# Patient Record
Sex: MALE | Race: WHITE | NOT HISPANIC OR LATINO
[De-identification: names, ages, dates, MRNs, and addresses within clinical notes are randomized per-mention and may not be internally consistent; named-entity substitution may affect disease eponyms.]

---

## 2017-05-08 ENCOUNTER — APPOINTMENT (OUTPATIENT)
Dept: VASCULAR SURGERY | Facility: CLINIC | Age: 77
End: 2017-05-08

## 2019-05-17 ENCOUNTER — EMERGENCY (EMERGENCY)
Facility: HOSPITAL | Age: 79
LOS: 1 days | Discharge: HOME | End: 2019-05-17
Attending: EMERGENCY MEDICINE | Admitting: EMERGENCY MEDICINE
Payer: MEDICARE

## 2019-05-17 VITALS
RESPIRATION RATE: 17 BRPM | OXYGEN SATURATION: 98 % | HEART RATE: 84 BPM | TEMPERATURE: 98 F | DIASTOLIC BLOOD PRESSURE: 76 MMHG | SYSTOLIC BLOOD PRESSURE: 157 MMHG

## 2019-05-17 DIAGNOSIS — M25.561 PAIN IN RIGHT KNEE: ICD-10-CM

## 2019-05-17 DIAGNOSIS — M62.81 MUSCLE WEAKNESS (GENERALIZED): ICD-10-CM

## 2019-05-17 DIAGNOSIS — M25.562 PAIN IN LEFT KNEE: ICD-10-CM

## 2019-05-17 PROCEDURE — 99284 EMERGENCY DEPT VISIT MOD MDM: CPT | Mod: 25

## 2019-05-17 RX ORDER — IBUPROFEN 200 MG
800 TABLET ORAL ONCE
Refills: 0 | Status: COMPLETED | OUTPATIENT
Start: 2019-05-17 | End: 2019-05-17

## 2019-05-17 RX ORDER — ACETAMINOPHEN 500 MG
975 TABLET ORAL ONCE
Refills: 0 | Status: COMPLETED | OUTPATIENT
Start: 2019-05-17 | End: 2019-05-18

## 2019-05-17 NOTE — ED ADULT NURSE NOTE - OBJECTIVE STATEMENT
79 y/o male presents to ED c/o bilat knee pain x1 day s/p mechanical fall. AOx3, states was using walker and could not tolerate bearing weight due to pain in bilat knees, states slowly fell onto bottom. Denies head trauma, LoC, dizziness, headache. Hx of arthritis, presents to ED because pain in bilat knees has been worsening.

## 2019-05-17 NOTE — ED ADULT TRIAGE NOTE - CHIEF COMPLAINT QUOTE
BIBA complaining of b/l knee and leg pain s/p sliding off a couch at home. Denies head injury. Denies hip/pelvic pain. States he normally walks with a cane.

## 2019-05-17 NOTE — ED PROVIDER NOTE - CARE PROVIDER_API CALL
Isaias Buenrostro)  Orthopaedic Surgery  [address] [place] [address]  Phone: [phone]  Fax: [phone]  Follow Up Time:

## 2019-05-17 NOTE — ED PROVIDER NOTE - OBJECTIVE STATEMENT
77 y/o M w/hx IDDM, OA, walks with cane at baseline 2/2 LE generalized weakness/gait instability, p/w b/l knee pain for 1 day, causing, 77 y/o M w/hx IDDM, OA, walks with cane at baseline 2/2 LE generalized weakness/gait instability, p/w b/l knee pain for 1 day. No direct trauma. Feels like similar pains he has had in his knees though more severe. No swelling/redness/fever/chills. Has been ambulatory but with difficulty 2/2 pain. Taken no pain meds at home, though typically takes tylenol.

## 2019-05-17 NOTE — ED PROVIDER NOTE - NS ED MD DISPO DISCHARGE CCDA
Please advise patient called back to set up her appointment I tried to help her but could not find a right time for her, please advise, thanks.   Patient/Caregiver provided printed discharge information.

## 2019-05-17 NOTE — ED PROVIDER NOTE - PHYSICAL EXAMINATION
VITAL SIGNS: I have reviewed nursing notes and confirm.  CONSTITUTIONAL: Well-developed; well-nourished; in no acute distress.  SKIN: Agree with RN documentation regarding decubitus evaluation. Remainder of skin exam is warm and dry, no acute rash.  HEAD: Normocephalic; atraumatic.  EYES: PERRL, EOM intact; conjunctiva and sclera clear.  ENT: No nasal discharge; airway clear.  NECK: Supple; non tender.  CARD: S1, S2 normal; no murmurs, gallops, or rubs. Regular rate and rhythm.  RESP: No wheezes, rales or rhonchi.  ABD: Normal bowel sounds; soft; non-distended; non-tender  EXT: Normal ROM. No clubbing, cyanosis or edema. No knee effusions or TTP b/l, no popliteal ttp, distal pulses intact  LYMPH: No acute cervical adenopathy.  NEURO: Alert, oriented. Grossly unremarkable.  PSYCH: Cooperative, appropriate.

## 2019-05-17 NOTE — ED ADULT NURSE NOTE - CHPI ED NUR SYMPTOMS NEG
no deformity/no numbness/no fever/no weakness/no abrasion/no tingling/no back pain/no bruising/no stiffness

## 2019-05-17 NOTE — ED PROVIDER NOTE - NSFOLLOWUPINSTRUCTIONS_ED_ALL_ED_FT
Log Out.    [employer]® CareNotes®     :  [facility]             OSTEOARTHRITIS - AfterCare(R) Instructions(ER/ED)     Osteoarthritis    WHAT YOU NEED TO KNOW:    Osteoarthritis (OA) occurs when cartilage (tissue that cushions a joint) wears away slowly and causes the bones to rub together. OA is a long-term condition that often affects the hands, neck, lower back, knees, and hips. OA is also called arthrosis or degenerative joint disease.    DISCHARGE INSTRUCTIONS:    Call your doctor or specialist if:     You have severe pain.      You cannot move your joint.      You have a fever.      Your joint is red and tender.      You have questions or concerns about your condition or care.    Medicines: You may need any of the following:     Acetaminophen decreases pain and fever. It is available without a doctor's order. Ask how much to take and how often to take it. Follow directions. Read the labels of all other medicines you are using to see if they also contain acetaminophen, or ask your doctor or pharmacist. Acetaminophen can cause liver damage if not taken correctly. Do not use more than 4 grams (4,000 milligrams) total of acetaminophen in one day.       NSAIDs, such as ibuprofen, help decrease swelling, pain, and fever. This medicine is available with or without a doctor's order. NSAIDs can cause stomach bleeding or kidney problems in certain people. If you take blood thinner medicine, always ask your healthcare provider if NSAIDs are safe for you. Always read the medicine label and follow directions.      Capsaicin cream may help decrease pain in your joint.       Prescription pain medicine may be given. Ask your healthcare provider how to take this medicine safely. Some prescription pain medicines contain acetaminophen. Do not take other medicines that contain acetaminophen without talking to your healthcare provider. Too much acetaminophen may cause liver damage. Prescription pain medicine may cause constipation. Ask your healthcare provider how to prevent or treat constipation.       Take your medicine as directed. Contact your healthcare provider if you think your medicine is not helping or if you have side effects. Tell him or her if you are allergic to any medicine. Keep a list of the medicines, vitamins, and herbs you take. Include the amounts, and when and why you take them. Bring the list or the pill bottles to follow-up visits. Carry your medicine list with you in case of an emergency.    Follow up with your healthcare provider as directed: Write down your questions so you remember to ask them during your visits.    Go to physical therapy as directed: A physical therapist teaches you exercises to help improve movement and strength, and to decrease pain in your joints. The exercises also help lower your risk for loss of function. A physical therapist may move an area with his or her hands. For example, he or she may move your leg in certain ways to treat osteoarthritis in your hip.    Manage your symptoms:     Stay active. Physical activity may reduce your pain and improve your ability to do daily activities. Avoid activities that cause pain. Ask your healthcare provider what type of exercise would be best for you.      Maintain a healthy weight. This helps decrease the strain on the joints in your back, hips, knees, ankles, and feet. Ask your healthcare provider what a healthy weight is for you. He or she can help you create a weight loss plan if you are overweight.      Use heat or ice on your joints as directed. Heat and ice help decrease pain, swelling, and muscle spasms. For heat, use a heating pad on a low setting for 20 minutes, or take a warm bath. For ice, use an ice pack, or put crushed ice in a plastic bag. Cover it with a towel before you place it on your joint. Use ice for 15 minutes every hour.      Massage the muscles around the joint. Massage helps relieve pain and stiffness. Your healthcare provider or a physical therapist can show you how to do this. If you have hip OA, another person may need to help you massage the area.      Use a cane, crutches, or a walker if directed. These help protect and relieve pressure on your ankle, knee, and hip joints. You may also be prescribed shoe inserts to decrease pressure in your joints.      Wear flat or low-heeled shoes. This will help decrease pain and reduce pressure on your ankle, knee, and hip joints.         © Copyright [employer] 2019 All illustrations and images included in CareNotes are the copyrighted property of [employer] or [employer].      back to top                      © Copyright [employer] 2019

## 2019-05-17 NOTE — ED ADULT NURSE NOTE - NSIMPLEMENTINTERV_GEN_ALL_ED
Implemented All Fall Risk Interventions:  [place] to call system. Call bell, personal items and telephone within reach. Instruct patient to call for assistance. Room bathroom lighting operational. Non-slip footwear when patient is off stretcher. Physically safe environment: no spills, clutter or unnecessary equipment. Stretcher in lowest position, wheels locked, appropriate side rails in place. Provide visual cue, wrist band, yellow gown, etc. Monitor gait and stability. Monitor for mental status changes and reorient to person, place, and time. Review medications for side effects contributing to fall risk. Reinforce activity limits and safety measures with patient and family.

## 2019-05-17 NOTE — ED PROVIDER NOTE - CLINICAL SUMMARY MEDICAL DECISION MAKING FREE TEXT BOX
No fx on xray, + OA, pain improved w/nsaids, ambulatory w/cane as per baseline, d/c home with ortho f/u and return precautions.

## 2019-05-18 VITALS
OXYGEN SATURATION: 99 % | RESPIRATION RATE: 16 BRPM | TEMPERATURE: 98 F | DIASTOLIC BLOOD PRESSURE: 88 MMHG | SYSTOLIC BLOOD PRESSURE: 158 MMHG | HEART RATE: 78 BPM

## 2019-05-18 PROCEDURE — 73562 X-RAY EXAM OF KNEE 3: CPT | Mod: 26,50

## 2019-05-18 RX ADMIN — Medication 800 MILLIGRAM(S): at 00:47

## 2019-05-18 RX ADMIN — Medication 800 MILLIGRAM(S): at 00:03

## 2019-05-18 RX ADMIN — Medication 975 MILLIGRAM(S): at 00:02

## 2019-05-18 RX ADMIN — Medication 975 MILLIGRAM(S): at 00:47

## 2019-05-18 NOTE — ED ADULT NURSE REASSESSMENT NOTE - NS ED NURSE REASSESS COMMENT FT1
Attempt to ambulate made, even with cane pt unable to ambulate at this time . Pt denies pain in bl knees but states weakness in legs when trying to walk. Pt states he uses a walker at home and the weakness has been happening for "a while" and is not a new.  MD Logan notified.

## 2019-05-22 ENCOUNTER — INPATIENT (INPATIENT)
Facility: HOSPITAL | Age: 79
LOS: 12 days | Discharge: NURSING, REHAB OR LONG-TERM CARE | DRG: 853 | End: 2019-06-04
Attending: INTERNAL MEDICINE | Admitting: INTERNAL MEDICINE
Payer: MEDICARE

## 2019-05-22 VITALS
TEMPERATURE: 100 F | RESPIRATION RATE: 18 BRPM | OXYGEN SATURATION: 93 % | DIASTOLIC BLOOD PRESSURE: 78 MMHG | WEIGHT: 220.46 LBS | SYSTOLIC BLOOD PRESSURE: 165 MMHG | HEART RATE: 95 BPM

## 2019-05-22 DIAGNOSIS — Z29.9 ENCOUNTER FOR PROPHYLACTIC MEASURES, UNSPECIFIED: ICD-10-CM

## 2019-05-22 DIAGNOSIS — Z90.49 ACQUIRED ABSENCE OF OTHER SPECIFIED PARTS OF DIGESTIVE TRACT: Chronic | ICD-10-CM

## 2019-05-22 DIAGNOSIS — Z98.890 OTHER SPECIFIED POSTPROCEDURAL STATES: Chronic | ICD-10-CM

## 2019-05-22 DIAGNOSIS — R74.8 ABNORMAL LEVELS OF OTHER SERUM ENZYMES: ICD-10-CM

## 2019-05-22 DIAGNOSIS — N17.9 ACUTE KIDNEY FAILURE, UNSPECIFIED: ICD-10-CM

## 2019-05-22 DIAGNOSIS — I10 ESSENTIAL (PRIMARY) HYPERTENSION: ICD-10-CM

## 2019-05-22 DIAGNOSIS — M17.0 BILATERAL PRIMARY OSTEOARTHRITIS OF KNEE: ICD-10-CM

## 2019-05-22 DIAGNOSIS — Z91.89 OTHER SPECIFIED PERSONAL RISK FACTORS, NOT ELSEWHERE CLASSIFIED: ICD-10-CM

## 2019-05-22 DIAGNOSIS — A41.9 SEPSIS, UNSPECIFIED ORGANISM: ICD-10-CM

## 2019-05-22 DIAGNOSIS — R09.89 OTHER SPECIFIED SYMPTOMS AND SIGNS INVOLVING THE CIRCULATORY AND RESPIRATORY SYSTEMS: ICD-10-CM

## 2019-05-22 DIAGNOSIS — N30.00 ACUTE CYSTITIS WITHOUT HEMATURIA: ICD-10-CM

## 2019-05-22 DIAGNOSIS — I50.9 HEART FAILURE, UNSPECIFIED: ICD-10-CM

## 2019-05-22 DIAGNOSIS — E11.9 TYPE 2 DIABETES MELLITUS WITHOUT COMPLICATIONS: ICD-10-CM

## 2019-05-22 LAB
ALBUMIN SERPL ELPH-MCNC: 2.5 G/DL — LOW (ref 3.4–5)
ALP SERPL-CCNC: 137 U/L — HIGH (ref 40–120)
ALT FLD-CCNC: 37 U/L — SIGNIFICANT CHANGE UP (ref 12–42)
ANION GAP SERPL CALC-SCNC: 14 MMOL/L — SIGNIFICANT CHANGE UP (ref 9–16)
ANION GAP SERPL CALC-SCNC: 17 MMOL/L — SIGNIFICANT CHANGE UP (ref 5–17)
APPEARANCE UR: CLEAR — SIGNIFICANT CHANGE UP
APTT BLD: 29.5 SEC — SIGNIFICANT CHANGE UP (ref 27.5–36.3)
AST SERPL-CCNC: 50 U/L — HIGH (ref 15–37)
BASOPHILS NFR BLD AUTO: 0.4 % — SIGNIFICANT CHANGE UP (ref 0–2)
BILIRUB SERPL-MCNC: 0.5 MG/DL — SIGNIFICANT CHANGE UP (ref 0.2–1.2)
BILIRUB UR-MCNC: NEGATIVE — SIGNIFICANT CHANGE UP
BUN SERPL-MCNC: 55 MG/DL — HIGH (ref 7–23)
BUN SERPL-MCNC: 61 MG/DL — HIGH (ref 7–23)
CALCIUM SERPL-MCNC: 8.6 MG/DL — SIGNIFICANT CHANGE UP (ref 8.4–10.5)
CALCIUM SERPL-MCNC: 8.7 MG/DL — SIGNIFICANT CHANGE UP (ref 8.5–10.5)
CHLORIDE SERPL-SCNC: 105 MMOL/L — SIGNIFICANT CHANGE UP (ref 96–108)
CHLORIDE SERPL-SCNC: 105 MMOL/L — SIGNIFICANT CHANGE UP (ref 96–108)
CK MB CFR SERPL CALC: 6.8 NG/ML — HIGH (ref 0–6.7)
CK SERPL-CCNC: 791 U/L — HIGH (ref 30–200)
CK SERPL-CCNC: 931 U/L — HIGH (ref 39–308)
CO2 SERPL-SCNC: 20 MMOL/L — LOW (ref 22–31)
CO2 SERPL-SCNC: 23 MMOL/L — SIGNIFICANT CHANGE UP (ref 22–31)
COLOR SPEC: YELLOW — SIGNIFICANT CHANGE UP
CREAT ?TM UR-MCNC: 32 MG/DL — SIGNIFICANT CHANGE UP
CREAT SERPL-MCNC: 3.28 MG/DL — HIGH (ref 0.5–1.3)
CREAT SERPL-MCNC: 3.67 MG/DL — HIGH (ref 0.5–1.3)
DIFF PNL FLD: ABNORMAL
EOSINOPHIL NFR BLD AUTO: 0.3 % — SIGNIFICANT CHANGE UP (ref 0–6)
FERRITIN SERPL-MCNC: 223 NG/ML — SIGNIFICANT CHANGE UP (ref 30–400)
GLUCOSE BLDC GLUCOMTR-MCNC: 134 MG/DL — HIGH (ref 70–99)
GLUCOSE SERPL-MCNC: 135 MG/DL — HIGH (ref 70–99)
GLUCOSE SERPL-MCNC: 143 MG/DL — HIGH (ref 70–99)
GLUCOSE UR QL: NEGATIVE — SIGNIFICANT CHANGE UP
HCT VFR BLD CALC: 35.4 % — LOW (ref 39–50)
HGB BLD-MCNC: 11.8 G/DL — LOW (ref 13–17)
IMM GRANULOCYTES NFR BLD AUTO: 3.5 % — HIGH (ref 0–1.5)
INR BLD: 1.39 — HIGH (ref 0.88–1.16)
IRON SATN MFR SERPL: 5 % — LOW (ref 16–55)
IRON SATN MFR SERPL: 9 UG/DL — LOW (ref 45–165)
KETONES UR-MCNC: NEGATIVE — SIGNIFICANT CHANGE UP
LACTATE SERPL-SCNC: 1.1 MMOL/L — SIGNIFICANT CHANGE UP (ref 0.4–2)
LEUKOCYTE ESTERASE UR-ACNC: ABNORMAL
LYMPHOCYTES # BLD AUTO: 4.7 % — LOW (ref 13–44)
MAGNESIUM SERPL-MCNC: 1.3 MG/DL — LOW (ref 1.6–2.6)
MAGNESIUM SERPL-MCNC: 1.7 MG/DL — SIGNIFICANT CHANGE UP (ref 1.6–2.6)
MCHC RBC-ENTMCNC: 26.4 PG — LOW (ref 27–34)
MCHC RBC-ENTMCNC: 33.3 G/DL — SIGNIFICANT CHANGE UP (ref 32–36)
MCV RBC AUTO: 79.2 FL — LOW (ref 80–100)
MONOCYTES NFR BLD AUTO: 9.1 % — SIGNIFICANT CHANGE UP (ref 2–14)
NEUTROPHILS NFR BLD AUTO: 82 % — HIGH (ref 43–77)
NITRITE UR-MCNC: POSITIVE
NT-PROBNP SERPL-SCNC: 3296 PG/ML — HIGH
PH UR: 6 — SIGNIFICANT CHANGE UP (ref 5–8)
PLATELET # BLD AUTO: 166 K/UL — SIGNIFICANT CHANGE UP (ref 150–400)
POTASSIUM SERPL-MCNC: 3.2 MMOL/L — LOW (ref 3.5–5.3)
POTASSIUM SERPL-MCNC: 3.8 MMOL/L — SIGNIFICANT CHANGE UP (ref 3.5–5.3)
POTASSIUM SERPL-SCNC: 3.2 MMOL/L — LOW (ref 3.5–5.3)
POTASSIUM SERPL-SCNC: 3.8 MMOL/L — SIGNIFICANT CHANGE UP (ref 3.5–5.3)
PROT SERPL-MCNC: 7.3 G/DL — SIGNIFICANT CHANGE UP (ref 6.4–8.2)
PROT UR-MCNC: 100 MG/DL
PROTHROM AB SERPL-ACNC: 15.6 SEC — HIGH (ref 10–12.9)
RBC # BLD: 4.42 M/UL — SIGNIFICANT CHANGE UP (ref 4.2–5.8)
RBC # BLD: 4.47 M/UL — SIGNIFICANT CHANGE UP (ref 4.2–5.8)
RBC # FLD: 15.1 % — HIGH (ref 10.3–14.5)
RETICS #: 28.3 K/UL — SIGNIFICANT CHANGE UP (ref 25–125)
RETICS/RBC NFR: 0.6 % — SIGNIFICANT CHANGE UP (ref 0.5–2.5)
SODIUM SERPL-SCNC: 142 MMOL/L — SIGNIFICANT CHANGE UP (ref 132–145)
SODIUM SERPL-SCNC: 142 MMOL/L — SIGNIFICANT CHANGE UP (ref 135–145)
SODIUM UR-SCNC: 90 MMOL/L — SIGNIFICANT CHANGE UP
SP GR SPEC: 1.01 — SIGNIFICANT CHANGE UP (ref 1–1.03)
TIBC SERPL-MCNC: 185 UG/DL — LOW (ref 220–430)
TRANSFERRIN SERPL-MCNC: 152 MG/DL — LOW (ref 200–360)
TROPONIN I SERPL-MCNC: 0.11 NG/ML — HIGH (ref 0.02–0.06)
TROPONIN T SERPL-MCNC: 0.07 NG/ML — CRITICAL HIGH (ref 0–0.01)
UIBC SERPL-MCNC: 176 UG/DL — SIGNIFICANT CHANGE UP (ref 110–370)
UROBILINOGEN FLD QL: 0.2 E.U./DL — SIGNIFICANT CHANGE UP
UUN UR-MCNC: 266 MG/DL — SIGNIFICANT CHANGE UP
WBC # BLD: 15 K/UL — HIGH (ref 3.8–10.5)
WBC # FLD AUTO: 15 K/UL — HIGH (ref 3.8–10.5)

## 2019-05-22 PROCEDURE — 99285 EMERGENCY DEPT VISIT HI MDM: CPT | Mod: 25

## 2019-05-22 PROCEDURE — 99223 1ST HOSP IP/OBS HIGH 75: CPT | Mod: GC

## 2019-05-22 PROCEDURE — 71045 X-RAY EXAM CHEST 1 VIEW: CPT | Mod: 26

## 2019-05-22 PROCEDURE — 93970 EXTREMITY STUDY: CPT | Mod: 26

## 2019-05-22 PROCEDURE — 93010 ELECTROCARDIOGRAM REPORT: CPT

## 2019-05-22 PROCEDURE — 93306 TTE W/DOPPLER COMPLETE: CPT | Mod: 26

## 2019-05-22 RX ORDER — OXYBUTYNIN CHLORIDE 5 MG
5 TABLET ORAL
Refills: 0 | Status: DISCONTINUED | OUTPATIENT
Start: 2019-05-22 | End: 2019-05-26

## 2019-05-22 RX ORDER — ACETAMINOPHEN 500 MG
650 TABLET ORAL ONCE
Refills: 0 | Status: COMPLETED | OUTPATIENT
Start: 2019-05-22 | End: 2019-05-22

## 2019-05-22 RX ORDER — MAGNESIUM SULFATE 500 MG/ML
1 VIAL (ML) INJECTION ONCE
Refills: 0 | Status: COMPLETED | OUTPATIENT
Start: 2019-05-22 | End: 2019-05-22

## 2019-05-22 RX ORDER — CEFTRIAXONE 500 MG/1
1 INJECTION, POWDER, FOR SOLUTION INTRAMUSCULAR; INTRAVENOUS ONCE
Refills: 0 | Status: COMPLETED | OUTPATIENT
Start: 2019-05-22 | End: 2019-05-22

## 2019-05-22 RX ORDER — POLYETHYLENE GLYCOL 3350 17 G/17G
17 POWDER, FOR SOLUTION ORAL ONCE
Refills: 0 | Status: COMPLETED | OUTPATIENT
Start: 2019-05-22 | End: 2019-05-22

## 2019-05-22 RX ORDER — AZITHROMYCIN 500 MG/1
500 TABLET, FILM COATED ORAL ONCE
Refills: 0 | Status: COMPLETED | OUTPATIENT
Start: 2019-05-22 | End: 2019-05-22

## 2019-05-22 RX ORDER — SERTRALINE 25 MG/1
1 TABLET, FILM COATED ORAL
Qty: 0 | Refills: 0 | DISCHARGE

## 2019-05-22 RX ORDER — GABAPENTIN 400 MG/1
300 CAPSULE ORAL DAILY
Refills: 0 | Status: DISCONTINUED | OUTPATIENT
Start: 2019-05-22 | End: 2019-05-27

## 2019-05-22 RX ORDER — FUROSEMIDE 40 MG
20 TABLET ORAL ONCE
Refills: 0 | Status: COMPLETED | OUTPATIENT
Start: 2019-05-22 | End: 2019-05-22

## 2019-05-22 RX ORDER — AMLODIPINE BESYLATE 2.5 MG/1
1 TABLET ORAL
Qty: 0 | Refills: 0 | DISCHARGE

## 2019-05-22 RX ORDER — INSULIN GLARGINE 100 [IU]/ML
10 INJECTION, SOLUTION SUBCUTANEOUS AT BEDTIME
Refills: 0 | Status: DISCONTINUED | OUTPATIENT
Start: 2019-05-22 | End: 2019-05-30

## 2019-05-22 RX ORDER — SENNA PLUS 8.6 MG/1
1 TABLET ORAL AT BEDTIME
Refills: 0 | Status: COMPLETED | OUTPATIENT
Start: 2019-05-22 | End: 2019-05-22

## 2019-05-22 RX ORDER — INSULIN LISPRO 100/ML
VIAL (ML) SUBCUTANEOUS
Refills: 0 | Status: DISCONTINUED | OUTPATIENT
Start: 2019-05-22 | End: 2019-06-04

## 2019-05-22 RX ORDER — HEPARIN SODIUM 5000 [USP'U]/ML
5000 INJECTION INTRAVENOUS; SUBCUTANEOUS EVERY 8 HOURS
Refills: 0 | Status: DISCONTINUED | OUTPATIENT
Start: 2019-05-22 | End: 2019-05-24

## 2019-05-22 RX ORDER — SODIUM CHLORIDE 9 MG/ML
2000 INJECTION INTRAMUSCULAR; INTRAVENOUS; SUBCUTANEOUS ONCE
Refills: 0 | Status: COMPLETED | OUTPATIENT
Start: 2019-05-22 | End: 2019-05-22

## 2019-05-22 RX ORDER — SERTRALINE 25 MG/1
100 TABLET, FILM COATED ORAL DAILY
Refills: 0 | Status: DISCONTINUED | OUTPATIENT
Start: 2019-05-22 | End: 2019-06-04

## 2019-05-22 RX ORDER — GABAPENTIN 400 MG/1
1 CAPSULE ORAL
Qty: 0 | Refills: 0 | DISCHARGE

## 2019-05-22 RX ORDER — ATORVASTATIN CALCIUM 80 MG/1
10 TABLET, FILM COATED ORAL AT BEDTIME
Refills: 0 | Status: DISCONTINUED | OUTPATIENT
Start: 2019-05-22 | End: 2019-06-04

## 2019-05-22 RX ORDER — SODIUM CHLORIDE 9 MG/ML
1000 INJECTION INTRAMUSCULAR; INTRAVENOUS; SUBCUTANEOUS ONCE
Refills: 0 | Status: COMPLETED | OUTPATIENT
Start: 2019-05-22 | End: 2019-05-22

## 2019-05-22 RX ORDER — AMLODIPINE BESYLATE 2.5 MG/1
10 TABLET ORAL EVERY 24 HOURS
Refills: 0 | Status: DISCONTINUED | OUTPATIENT
Start: 2019-05-23 | End: 2019-05-23

## 2019-05-22 RX ADMIN — Medication 650 MILLIGRAM(S): at 14:30

## 2019-05-22 RX ADMIN — SODIUM CHLORIDE 500 MILLILITER(S): 9 INJECTION INTRAMUSCULAR; INTRAVENOUS; SUBCUTANEOUS at 14:31

## 2019-05-22 RX ADMIN — SODIUM CHLORIDE 2000 MILLILITER(S): 9 INJECTION INTRAMUSCULAR; INTRAVENOUS; SUBCUTANEOUS at 16:31

## 2019-05-22 RX ADMIN — Medication 100 GRAM(S): at 23:40

## 2019-05-22 RX ADMIN — SODIUM CHLORIDE 1000 MILLILITER(S): 9 INJECTION INTRAMUSCULAR; INTRAVENOUS; SUBCUTANEOUS at 14:32

## 2019-05-22 RX ADMIN — Medication 20 MILLIGRAM(S): at 16:54

## 2019-05-22 RX ADMIN — ATORVASTATIN CALCIUM 10 MILLIGRAM(S): 80 TABLET, FILM COATED ORAL at 22:29

## 2019-05-22 RX ADMIN — CEFTRIAXONE 100 GRAM(S): 500 INJECTION, POWDER, FOR SOLUTION INTRAMUSCULAR; INTRAVENOUS at 14:00

## 2019-05-22 RX ADMIN — GABAPENTIN 300 MILLIGRAM(S): 400 CAPSULE ORAL at 22:29

## 2019-05-22 RX ADMIN — SODIUM CHLORIDE 1000 MILLILITER(S): 9 INJECTION INTRAMUSCULAR; INTRAVENOUS; SUBCUTANEOUS at 16:31

## 2019-05-22 RX ADMIN — Medication 650 MILLIGRAM(S): at 15:30

## 2019-05-22 RX ADMIN — AZITHROMYCIN 500 MILLIGRAM(S): 500 TABLET, FILM COATED ORAL at 16:55

## 2019-05-22 NOTE — H&P ADULT - ASSESSMENT
78M with h/o IDDM, HTN, OA BIBA to [facility] for generalized weakness since waking up this morning. 78M with h/o IDDM, HTN, OA BIBA to [facility] for generalized weakness, found to have sepsis 2/2 UTI.

## 2019-05-22 NOTE — H&P ADULT - NSHPSOCIALHISTORY_GEN_ALL_CORE
Patient lives alone since his wife passed away last year, has no children. He used to work at a ware-house, currently retired. He is a former smoker, quit smoking like 15 years ago. Uses cane to ambulate. No EtOH or recreational drug use.

## 2019-05-22 NOTE — ED PROVIDER NOTE - DIAGNOSTIC INTERPRETATION
Interpreted by ED physician  chest x-ray, 1 view  Lungs with no infiltrate, b/l slightly increased intersitial markings, heart shadow enlarged, bony structures normal, no free air under diaphragm, no PTX

## 2019-05-22 NOTE — ED PROVIDER NOTE - CONSTITUTIONAL, MLM
normal... Well appearing, well nourished, awake, alert, oriented to person, place, time/situation and in no apparent distress. Disheveled, unkempt, awake, alert, oriented to person, place, time/situation and in no apparent distress.

## 2019-05-22 NOTE — ED PROVIDER NOTE - NEURO NEGATIVE STATEMENT, MLM
no loss of consciousness, no acute gait abnormality, no headache, no sensory deficits, and no lateral weakness.

## 2019-05-22 NOTE — H&P ADULT - PROBLEM SELECTOR PLAN 6
-takes norvasc 10qd, enalapril 10mg qd, lasix 80mg qd at home  -will restart norvasc and enalapril -takes norvasc 10qd, enalapril 10mg qd, lasix 80mg qd at home  -will restart norvasc 10qd    #Microcytic Anemia  -likely ACD vs TORREY  -f/u iron studies  -monitor CBC

## 2019-05-22 NOTE — ED PROVIDER NOTE - CLINICAL SUMMARY MEDICAL DECISION MAKING FREE TEXT BOX
pt with weakness; soft sepsis triggered on arrival.  ddx includes pna, uti, pt with hx of HTN IDDM and concern for possible chf however pt not in fluid overload at this time.   labs, ekg, fluids and antibiotics.  US b/l r/o dvt

## 2019-05-22 NOTE — ED PROVIDER NOTE - ATTENDING CONTRIBUTION TO CARE
78 y.o. male with hx IDDM HTN OA with c/o gen weakness malaise, fatigue, ongoing for several days, assoc with some urinary sx, treated for UTI in ED, labs showed MANAN, elevated BNP and slightly positive trop (suspect this is due to MANAN which may actually be longstanding) because EKG is WNL.  uncertain of prior BUN/Cr, US negative for DVT.  will admit to [facility]/medicine for workup of MANAN.

## 2019-05-22 NOTE — H&P ADULT - NSHPLABSRESULTS_GEN_ALL_CORE
LABS:                         11.8   15.0  )-----------( 166      ( 22 May 2019 14:14 )             35.4         142  |  105  |  61<H>  ----------------------------<  143<H>  3.2<L>   |  23  |  3.67<H>    Ca    8.7      22 May 2019 14:14  Mg     1.3         TPro  7.3  /  Alb  2.5<L>  /  TBili  0.5  /  DBili  x   /  AST  50<H>  /  ALT  37  /  AlkPhos  137<H>      PT/INR - ( 22 May 2019 14:33 )   PT: 15.6 sec;   INR: 1.39          PTT - ( 22 May 2019 14:33 )  PTT:29.5 sec  Urinalysis Basic - ( 22 May 2019 15:47 )    Color: Yellow / Appearance: Clear / S.010 / pH: x  Gluc: x / Ketone: NEGATIVE  / Bili: NEGATIVE / Urobili: 0.2 E.U./dL   Blood: x / Protein: 100 mg/dL / Nitrite: POSITIVE   Leuk Esterase: Small / RBC: > 10 /HPF / WBC 5-10 /HPF   Sq Epi: x / Non Sq Epi: 5-10 /HPF / Bacteria: Present /HPF      CARDIAC MARKERS ( 22 May 2019 14:14 )  0.106 ng/mL / x     / 931 U/L / x     / x          Serum Pro-Brain Natriuretic Peptide: 3296 pg/mL ( @ 14:14)    Lactate, Blood: 1.1 mmoL/L ( @ 14:14)      RADIOLOGY, EKG & ADDITIONAL TESTS: Reviewed.   EKG: NSR at 84, normal axis, QTc 451  CXR: b/l pulmonary congestion

## 2019-05-22 NOTE — H&P ADULT - PROBLEM SELECTOR PLAN 4
-hx of CHF, type unknown  -received 3L NS at [facility] -> patient was initially on RA 93% then placed on 2L  -takes lasix 80mg qd at home  -will hold lasix 40mg in the setting of sepsis -hx of CHF, type unknown  -takes lasix 80mg qd at home  -received 3L NS at [facility] -> patient was initially on RA 93% then placed on 2L  -CXR showed b/l pulmonary congestion, likely 2/2 3L NS in the ED  -s/p lasix 20iv in the ED  -will hold lasix 40mg in the setting of sepsis -unclear if patient has hx of CHF, patient follows with cardiolgist  -takes lasix 80mg qd at home  -received 3L NS at [facility] -CXR showed b/l pulmonary congestion, likely 2/2 3L NS in the ED  -s/p lasix 20iv in the ED  -will hold lasix in the setting of sepsis -unclear if patient has hx of CHF, patient follows with cardiologist  -takes lasix 80mg qd at home  -received 3L NS at [facility]   -s/p lasix 20iv in the ED  -will hold lasix in the setting of sepsis

## 2019-05-22 NOTE — ED PROVIDER NOTE - CHPI ED SYMPTOMS NEG
no chills/no abdominal pain, no diarrhea, no syncope, no chest pain, no SOB/no nausea/no fever/no numbness/no vomiting

## 2019-05-22 NOTE — H&P ADULT - PROBLEM SELECTOR PLAN 10
1) PCP Contacted on Admission: (Y/N) --> Name & Phone #:  2) Date of Contact with PCP:  3) PCP Contacted at Discharge: (Y/N)  4) Summary of Handoff Given to PCP:   5) Post-Discharge Appointment Date and Location: 1) PCP Contacted on Admission: (Y/N) --> No. Name & Phone #: Dr. Bassem Lundberg, [phone]  2) Date of Contact with PCP:  3) PCP Contacted at Discharge: (Y/N)  4) Summary of Handoff Given to PCP:   5) Post-Discharge Appointment Date and Location:

## 2019-05-22 NOTE — H&P ADULT - PROBLEM SELECTOR PLAN 3
-sCr 3.67 on admission, baseline sCr unknown  -Etiology: pre-renal in the setting of sepsis vs post-renal in the setting of UTI  -f/u urine lytes  -f/u bladder scan: 94cc  -f/u retroperineal US to evaluate for anatomical abnormalities  -f/u collateral -sCr 3.67 on admission, baseline sCr unknown  -Etiology: pre-renal in the setting of sepsis vs intra-renal  -f/u urine lytes: FEUREA 49.6% suggestive of intrarenal  -holding lasix and enalapril  -f/u bladder scan: 94cc  -f/u retroperineal US to evaluate for anatomical abnormalities  -f/u collateral    #Anion gap MA  -likely in the setting of MANAN  -monitor renal function  -plan as above

## 2019-05-22 NOTE — ED ADULT NURSE NOTE - OBJECTIVE STATEMENT
Pt presents to ED with c/o weakness/fatigue - poor historian, chronically ill appearing Pt presents to ED with c/o weakness/fatigue - poor historian, chronically ill appearing, +protuberant abdomen with food stains on t-shirt and urine on socks. Also presents with urinary complaints, seen here recently and dx with ?PNA, denies CP, SOB or palpitations

## 2019-05-22 NOTE — ED PROVIDER NOTE - CARE PLAN
Principal Discharge DX:	Pneumonia  Secondary Diagnosis:	UTI (urinary tract infection)  Secondary Diagnosis:	CHF (congestive heart failure)  Secondary Diagnosis:	Renal insufficiency

## 2019-05-22 NOTE — H&P ADULT - NSHPPHYSICALEXAM_GEN_ALL_CORE
VITAL SIGNS:  T(C): 37 (05-22-19 @ 21:06), Max: 38.2 (05-22-19 @ 14:23)  T(F): 98.6 (05-22-19 @ 21:06), Max: 100.7 (05-22-19 @ 14:23)  HR: 68 (05-22-19 @ 21:06) (68 - 95)  BP: 139/64 (05-22-19 @ 21:06) (139/64 - 166/90)  BP(mean): --  RR: 17 (05-22-19 @ 21:06) (17 - 22)  SpO2: 96% (05-22-19 @ 21:06) (92% - 96%)  Wt(kg): --    PHYSICAL EXAM:  Constitutional: WDWN resting comfortably in bed; NAD  Head: NC/AT  Eyes: PERRL, EOMI, anicteric sclera  ENT: no nasal discharge; uvula midline, no oropharyngeal erythema or exudates; dry mucosa  Neck: supple; no JVD   Respiratory: bibasilar crackles, no egophony   Cardiac: +S1/S2; RRR  Gastrointestinal: abdomen soft, NT/Distended; no rebound or guarding; +BSx4  Genitourinary: no nagel  Back: no CVAT B/L  Extremities: WWP, no clubbing or cyanosis; no peripheral edema  Musculoskeletal: NROM x4; no joint swelling, tenderness or erythema  Vascular: 2+ radial, femoral, DP/PT pulses B/L  Neurologic: AAOx3; CNII-XII grossly intact; no focal deficits  Psychiatric: affect and characteristics of appearance, verbalizations, behaviors are appropriate VITAL SIGNS:  T(C): 37 (05-22-19 @ 21:06), Max: 38.2 (05-22-19 @ 14:23)  T(F): 98.6 (05-22-19 @ 21:06), Max: 100.7 (05-22-19 @ 14:23)  HR: 68 (05-22-19 @ 21:06) (68 - 95)  BP: 139/64 (05-22-19 @ 21:06) (139/64 - 166/90)  BP(mean): --  RR: 17 (05-22-19 @ 21:06) (17 - 22)  SpO2: 96% (05-22-19 @ 21:06) (92% - 96%)  Wt(kg): --    PHYSICAL EXAM:  Constitutional: WDWN resting comfortably in bed; NAD  Head: NC/AT  Eyes: PERRL, EOMI, anicteric sclera  ENT: no nasal discharge; uvula midline, no oropharyngeal erythema or exudates; dry mucosa  Neck: supple; no JVD   Respiratory: bibasilar crackles, no egophony   Cardiac: +S1/S2; RRR  Gastrointestinal: abdomen soft, NT/Distended; no rebound or guarding; +BSx4  Genitourinary: no nagel  Rectal: brown stool, good rectal tone  Back: no CVAT B/L  Extremities: WWP, no clubbing or cyanosis; no peripheral edema  Musculoskeletal: NROM x4; no joint swelling, tenderness or erythema  Vascular: 2+ radial, femoral, DP/PT pulses B/L  Neurologic: AAOx3; CNII-XII grossly intact; no focal deficits  Psychiatric: affect and characteristics of appearance, verbalizations, behaviors are appropriate

## 2019-05-22 NOTE — H&P ADULT - PROBLEM SELECTOR PLAN 7
-takes -takes humulin 70/30 10units qd at home  -will start with lantus 10qhs and ISS  -monitor FS and titrate as needed

## 2019-05-22 NOTE — ED ADULT NURSE NOTE - CHPI ED NUR SYMPTOMS NEG
no vomiting/no diaphoresis/no chills/no chest pain/no dizziness/no shortness of breath/no syncope/no fever/no nausea/no congestion/no back pain

## 2019-05-22 NOTE — H&P ADULT - PROBLEM SELECTOR PLAN 1
-reports increased urinary frequency and occasional dysuria, UA positive  -s/p ceftriaxone and azithromycin at [facility]  -will continue ceftriaxone   -f/u urine culture  -f/u retroperineal US to evaluate for anatomical abnormalities -reports increased urinary frequency and occasional dysuria, UA positive  -s/p ceftriaxone and azithromycin at [facility]  -will continue ceftriaxone   -f/u urine culture  -f/u retroperineal US to evaluate for anatomical abnormalities    #Generalized weakness  -DDx: 2/2 infection vs arthritis vs deconditioning  -reports chronic back pain, but has muscle strength 5/5 in all four extremities, good rectal tone  -PT evaluation

## 2019-05-22 NOTE — H&P ADULT - NSHPOUTPATIENTPROVIDERS_GEN_ALL_CORE
PCP: Dr. Bassem Lundberg, [phone] at [facility]  Cardiologist: Dr. Neil, [phone]

## 2019-05-22 NOTE — H&P ADULT - NSICDXPASTMEDICALHX_GEN_ALL_CORE_FT
PAST MEDICAL HISTORY:  HTN (hypertension)     IDDM (insulin dependent diabetes mellitus)     OA (osteoarthritis)

## 2019-05-22 NOTE — ED PROVIDER NOTE - OBJECTIVE STATEMENT
78 y o male with PMHx of IDDM, HTN, OA, walks with cane at baseline, was BIBA for c/o generalized weakness since waking up this morning. Reports additional urinary frequency. Pt is an occasional smoker. Denies abd pain, N/V/D, fevers, chills, syncope, chest pain, SOB, or other complaints. 78 y o male with PMHx of IDDM, HTN, OA, walks with cane at baseline, was BIBA for c/o generalized weakness since waking up this morning. Reports additional urinary frequency. Pt is an occasional smoker. Denies abd pain, N/V/D, fevers, chills, syncope, chest pain, SOB, or other complaints.    PCP: Dr. Bassem Lundberg, [phone] at [facility]  Cardiologist: Dr. Neil, [phone]

## 2019-05-22 NOTE — H&P ADULT - HISTORY OF PRESENT ILLNESS
78M with h/o IDDM, HTN, OA BIBA to [facility] for generalized weakness since waking up this morning. He noticed having increased urinary frequency for the last two days and intermittent dysuria today. Denies hx of BPH or discomfort with voiding. Denies nasal congestion, increased sputum production or cough. Patient lives alone since his wife passed away last year, has no children. He used to work at a ware-house, currently retired. He is a former smoker, quit smoking like 15 years ago. Of note, patient was at [facility] ED four days ago after sliding off his cough and falling on his knee. X-ray showed b/l OA of the knees. Pt uses cane at baseline for ambulation. He has taking Tylenol and Motrin daily for pain control.      At [facility], vitals Tm 100.7, P95, 165/78, R18 - R22, Saturation 93%RA -> 95% on 2L NC. Pt received ceftriaxone 1g, azithromycin 500mg, Lasix 20mg iv, 3L NS, Tylenol 650mg. Pt transferred to [facility] for further care.     ROS: Has increased urinary frequency and occasional dysuria. Reports chronic b/l knee pain. Denies fever, chills, SOB, CP, palpitations, abdominal pain, n/v or diarrhea. Last BM yesterday. 78M with h/o IDDM, HTN, OA BIBA to [facility] for generalized weakness since waking up this morning. He noticed having increased urinary frequency for the last two days and intermittent dysuria today. Denies hx of BPH or discomfort with voiding. Denies nasal congestion, increased sputum production or cough. Patient lives alone since his wife passed away last year, has no children. He used to work at a ware-house, currently retired. He is a former smoker, quit smoking like 15 years ago. Of note, patient was at [facility] ED four days ago after sliding off his cough and falling on his knee. X-ray showed b/l OA of the knees. Pt uses cane at baseline for ambulation. He has taking Tylenol and Motrin daily for pain control.      At [facility], vitals Tm 100.7, P95, 165/78, R18 - R22, Saturation 93%RA -> 95% on 2L NC. Pt received ceftriaxone 1g, azithromycin 500mg, Lasix 20mg iv, 3L NS, Tylenol 650mg. Pt transferred to [facility] for further care.     ROS: Has increased urinary frequency and occasional dysuria. Reports chronic back pain and b/l knee pain. Denies fever, chills, SOB, CP, palpitations, abdominal pain, n/v or diarrhea. Last BM yesterday.

## 2019-05-22 NOTE — H&P ADULT - PROBLEM SELECTOR PLAN 5
-EKG: NSR at 84, normal axis, QTc 451, no ST changes  -likely 2/2 demand ischemia  -will trend cardiac enzymes to peak -EKG: NSR at 84, normal axis, QTc 451, no ST changes  -likely 2/2 demand ischemia  -will trend cardiac enzymes to peak  -EKG in AM

## 2019-05-22 NOTE — H&P ADULT - PROBLEM SELECTOR PLAN 2
-meets 2/4 sepsis criteria (Tm 100.7, WBC 15, lactate <2)  -Etiology: UTI vs CAP vs viral   -s/p 3L NS in the ED  -f/u blood culture  -f/u urine culture  -f/u RVP  -reperfusion exam perfomed -meets 2/4 sepsis criteria (Tm 100.7, WBC 15, lactate <2)  -Etiology: UTI vs viral. Less likely CAP.   -s/p 3L NS in the ED  -f/u blood culture  -f/u urine culture  -f/u RVP  -reperfusion exam perfomed

## 2019-05-23 DIAGNOSIS — R78.81 BACTEREMIA: ICD-10-CM

## 2019-05-23 DIAGNOSIS — A41.9 SEPSIS, UNSPECIFIED ORGANISM: ICD-10-CM

## 2019-05-23 DIAGNOSIS — N39.0 URINARY TRACT INFECTION, SITE NOT SPECIFIED: ICD-10-CM

## 2019-05-23 DIAGNOSIS — N17.9 ACUTE KIDNEY FAILURE, UNSPECIFIED: ICD-10-CM

## 2019-05-23 DIAGNOSIS — I25.10 ATHEROSCLEROTIC HEART DISEASE OF NATIVE CORONARY ARTERY WITHOUT ANGINA PECTORIS: ICD-10-CM

## 2019-05-23 DIAGNOSIS — R63.8 OTHER SYMPTOMS AND SIGNS CONCERNING FOOD AND FLUID INTAKE: ICD-10-CM

## 2019-05-23 DIAGNOSIS — I10 ESSENTIAL (PRIMARY) HYPERTENSION: ICD-10-CM

## 2019-05-23 DIAGNOSIS — M19.90 UNSPECIFIED OSTEOARTHRITIS, UNSPECIFIED SITE: ICD-10-CM

## 2019-05-23 LAB
ALBUMIN SERPL ELPH-MCNC: 2.8 G/DL — LOW (ref 3.3–5)
ALP SERPL-CCNC: 140 U/L — HIGH (ref 40–120)
ALT FLD-CCNC: 28 U/L — SIGNIFICANT CHANGE UP (ref 10–45)
ANION GAP SERPL CALC-SCNC: 16 MMOL/L — SIGNIFICANT CHANGE UP (ref 5–17)
ANION GAP SERPL CALC-SCNC: 17 MMOL/L — SIGNIFICANT CHANGE UP (ref 5–17)
AST SERPL-CCNC: 28 U/L — SIGNIFICANT CHANGE UP (ref 10–40)
BASOPHILS # BLD AUTO: 0 K/UL — SIGNIFICANT CHANGE UP (ref 0–0.2)
BASOPHILS NFR BLD AUTO: 0 % — SIGNIFICANT CHANGE UP (ref 0–2)
BILIRUB SERPL-MCNC: 0.3 MG/DL — SIGNIFICANT CHANGE UP (ref 0.2–1.2)
BUN SERPL-MCNC: 47 MG/DL — HIGH (ref 7–23)
BUN SERPL-MCNC: 56 MG/DL — HIGH (ref 7–23)
CALCIUM SERPL-MCNC: 8.9 MG/DL — SIGNIFICANT CHANGE UP (ref 8.4–10.5)
CALCIUM SERPL-MCNC: 9 MG/DL — SIGNIFICANT CHANGE UP (ref 8.4–10.5)
CHLORIDE SERPL-SCNC: 102 MMOL/L — SIGNIFICANT CHANGE UP (ref 96–108)
CHLORIDE SERPL-SCNC: 105 MMOL/L — SIGNIFICANT CHANGE UP (ref 96–108)
CK MB CFR SERPL CALC: 3.6 NG/ML — SIGNIFICANT CHANGE UP (ref 0–6.7)
CK SERPL-CCNC: 538 U/L — HIGH (ref 30–200)
CO2 SERPL-SCNC: 20 MMOL/L — LOW (ref 22–31)
CO2 SERPL-SCNC: 21 MMOL/L — LOW (ref 22–31)
CREAT SERPL-MCNC: 3.12 MG/DL — HIGH (ref 0.5–1.3)
CREAT SERPL-MCNC: 3.3 MG/DL — HIGH (ref 0.5–1.3)
E COLI DNA BLD POS QL NAA+NON-PROBE: SIGNIFICANT CHANGE UP
EOSINOPHIL # BLD AUTO: 0 K/UL — SIGNIFICANT CHANGE UP (ref 0–0.5)
EOSINOPHIL NFR BLD AUTO: 0 % — SIGNIFICANT CHANGE UP (ref 0–6)
GLUCOSE BLDC GLUCOMTR-MCNC: 127 MG/DL — HIGH (ref 70–99)
GLUCOSE SERPL-MCNC: 116 MG/DL — HIGH (ref 70–99)
GLUCOSE SERPL-MCNC: 198 MG/DL — HIGH (ref 70–99)
GRAM STN FLD: SIGNIFICANT CHANGE UP
HBA1C BLD-MCNC: 7.3 % — HIGH (ref 4–5.6)
HCT VFR BLD CALC: 36.3 % — LOW (ref 39–50)
HCT VFR BLD CALC: 38.5 % — LOW (ref 39–50)
HGB BLD-MCNC: 11.4 G/DL — LOW (ref 13–17)
HGB BLD-MCNC: 12.6 G/DL — LOW (ref 13–17)
LACTATE SERPL-SCNC: 1.5 MMOL/L — SIGNIFICANT CHANGE UP (ref 0.5–2)
LYMPHOCYTES # BLD AUTO: 1.06 K/UL — SIGNIFICANT CHANGE UP (ref 1–3.3)
LYMPHOCYTES # BLD AUTO: 7.2 % — LOW (ref 13–44)
MAGNESIUM SERPL-MCNC: 1.8 MG/DL — SIGNIFICANT CHANGE UP (ref 1.6–2.6)
MAGNESIUM SERPL-MCNC: 1.9 MG/DL — SIGNIFICANT CHANGE UP (ref 1.6–2.6)
MCHC RBC-ENTMCNC: 26 PG — LOW (ref 27–34)
MCHC RBC-ENTMCNC: 26.6 PG — LOW (ref 27–34)
MCHC RBC-ENTMCNC: 31.4 GM/DL — LOW (ref 32–36)
MCHC RBC-ENTMCNC: 32.7 GM/DL — SIGNIFICANT CHANGE UP (ref 32–36)
MCV RBC AUTO: 81.2 FL — SIGNIFICANT CHANGE UP (ref 80–100)
MCV RBC AUTO: 82.9 FL — SIGNIFICANT CHANGE UP (ref 80–100)
METHOD TYPE: SIGNIFICANT CHANGE UP
MONOCYTES # BLD AUTO: 0.4 K/UL — SIGNIFICANT CHANGE UP (ref 0–0.9)
MONOCYTES NFR BLD AUTO: 2.7 % — SIGNIFICANT CHANGE UP (ref 2–14)
NEUTROPHILS # BLD AUTO: 13.22 K/UL — HIGH (ref 1.8–7.4)
NEUTROPHILS NFR BLD AUTO: 82.9 % — HIGH (ref 43–77)
NRBC # BLD: 0 /100 WBCS — SIGNIFICANT CHANGE UP (ref 0–0)
PHOSPHATE SERPL-MCNC: 4.1 MG/DL — SIGNIFICANT CHANGE UP (ref 2.5–4.5)
PLATELET # BLD AUTO: 164 K/UL — SIGNIFICANT CHANGE UP (ref 150–400)
PLATELET # BLD AUTO: 186 K/UL — SIGNIFICANT CHANGE UP (ref 150–400)
POTASSIUM SERPL-MCNC: 3.6 MMOL/L — SIGNIFICANT CHANGE UP (ref 3.5–5.3)
POTASSIUM SERPL-MCNC: 3.7 MMOL/L — SIGNIFICANT CHANGE UP (ref 3.5–5.3)
POTASSIUM SERPL-SCNC: 3.6 MMOL/L — SIGNIFICANT CHANGE UP (ref 3.5–5.3)
POTASSIUM SERPL-SCNC: 3.7 MMOL/L — SIGNIFICANT CHANGE UP (ref 3.5–5.3)
PROT SERPL-MCNC: 7.4 G/DL — SIGNIFICANT CHANGE UP (ref 6–8.3)
RAPID RVP RESULT: SIGNIFICANT CHANGE UP
RBC # BLD: 4.38 M/UL — SIGNIFICANT CHANGE UP (ref 4.2–5.8)
RBC # BLD: 4.74 M/UL — SIGNIFICANT CHANGE UP (ref 4.2–5.8)
RBC # FLD: 15.1 % — HIGH (ref 10.3–14.5)
RBC # FLD: 15.2 % — HIGH (ref 10.3–14.5)
SODIUM SERPL-SCNC: 139 MMOL/L — SIGNIFICANT CHANGE UP (ref 135–145)
SODIUM SERPL-SCNC: 142 MMOL/L — SIGNIFICANT CHANGE UP (ref 135–145)
SPECIMEN SOURCE: SIGNIFICANT CHANGE UP
SPECIMEN SOURCE: SIGNIFICANT CHANGE UP
TROPONIN T SERPL-MCNC: 0.08 NG/ML — CRITICAL HIGH (ref 0–0.01)
WBC # BLD: 14.49 K/UL — HIGH (ref 3.8–10.5)
WBC # BLD: 14.67 K/UL — HIGH (ref 3.8–10.5)
WBC # FLD AUTO: 14.49 K/UL — HIGH (ref 3.8–10.5)
WBC # FLD AUTO: 14.67 K/UL — HIGH (ref 3.8–10.5)

## 2019-05-23 PROCEDURE — 71045 X-RAY EXAM CHEST 1 VIEW: CPT | Mod: 26

## 2019-05-23 PROCEDURE — 93010 ELECTROCARDIOGRAM REPORT: CPT | Mod: 76

## 2019-05-23 PROCEDURE — 93306 TTE W/DOPPLER COMPLETE: CPT | Mod: 26

## 2019-05-23 PROCEDURE — 99233 SBSQ HOSP IP/OBS HIGH 50: CPT | Mod: GC

## 2019-05-23 RX ORDER — SODIUM CHLORIDE 9 MG/ML
500 INJECTION INTRAMUSCULAR; INTRAVENOUS; SUBCUTANEOUS ONCE
Refills: 0 | Status: COMPLETED | OUTPATIENT
Start: 2019-05-23 | End: 2019-05-23

## 2019-05-23 RX ORDER — POLYETHYLENE GLYCOL 3350 17 G/17G
17 POWDER, FOR SOLUTION ORAL DAILY
Refills: 0 | Status: DISCONTINUED | OUTPATIENT
Start: 2019-05-23 | End: 2019-06-04

## 2019-05-23 RX ORDER — ACETAMINOPHEN 500 MG
650 TABLET ORAL EVERY 6 HOURS
Refills: 0 | Status: DISCONTINUED | OUTPATIENT
Start: 2019-05-23 | End: 2019-05-25

## 2019-05-23 RX ORDER — LANOLIN ALCOHOL/MO/W.PET/CERES
5 CREAM (GRAM) TOPICAL AT BEDTIME
Refills: 0 | Status: COMPLETED | OUTPATIENT
Start: 2019-05-23 | End: 2019-05-23

## 2019-05-23 RX ORDER — CEFTRIAXONE 500 MG/1
1 INJECTION, POWDER, FOR SOLUTION INTRAMUSCULAR; INTRAVENOUS ONCE
Refills: 0 | Status: COMPLETED | OUTPATIENT
Start: 2019-05-23 | End: 2019-05-23

## 2019-05-23 RX ORDER — CEFTRIAXONE 500 MG/1
1 INJECTION, POWDER, FOR SOLUTION INTRAMUSCULAR; INTRAVENOUS EVERY 24 HOURS
Refills: 0 | Status: DISCONTINUED | OUTPATIENT
Start: 2019-05-23 | End: 2019-05-23

## 2019-05-23 RX ORDER — FUROSEMIDE 40 MG
40 TABLET ORAL ONCE
Refills: 0 | Status: COMPLETED | OUTPATIENT
Start: 2019-05-23 | End: 2019-05-23

## 2019-05-23 RX ORDER — SENNA PLUS 8.6 MG/1
2 TABLET ORAL AT BEDTIME
Refills: 0 | Status: DISCONTINUED | OUTPATIENT
Start: 2019-05-23 | End: 2019-06-04

## 2019-05-23 RX ORDER — CEFTRIAXONE 500 MG/1
2 INJECTION, POWDER, FOR SOLUTION INTRAMUSCULAR; INTRAVENOUS EVERY 24 HOURS
Refills: 0 | Status: DISCONTINUED | OUTPATIENT
Start: 2019-05-24 | End: 2019-05-30

## 2019-05-23 RX ORDER — SODIUM CHLORIDE 9 MG/ML
250 INJECTION INTRAMUSCULAR; INTRAVENOUS; SUBCUTANEOUS ONCE
Refills: 0 | Status: COMPLETED | OUTPATIENT
Start: 2019-05-23 | End: 2019-05-23

## 2019-05-23 RX ORDER — DOCUSATE SODIUM 100 MG
100 CAPSULE ORAL
Refills: 0 | Status: DISCONTINUED | OUTPATIENT
Start: 2019-05-23 | End: 2019-06-04

## 2019-05-23 RX ADMIN — Medication 2: at 17:57

## 2019-05-23 RX ADMIN — INSULIN GLARGINE 10 UNIT(S): 100 INJECTION, SOLUTION SUBCUTANEOUS at 22:03

## 2019-05-23 RX ADMIN — Medication 5 MILLIGRAM(S): at 00:31

## 2019-05-23 RX ADMIN — HEPARIN SODIUM 5000 UNIT(S): 5000 INJECTION INTRAVENOUS; SUBCUTANEOUS at 13:25

## 2019-05-23 RX ADMIN — Medication 40 MILLIGRAM(S): at 16:06

## 2019-05-23 RX ADMIN — AMLODIPINE BESYLATE 10 MILLIGRAM(S): 2.5 TABLET ORAL at 05:15

## 2019-05-23 RX ADMIN — SODIUM CHLORIDE 250 MILLILITER(S): 9 INJECTION INTRAMUSCULAR; INTRAVENOUS; SUBCUTANEOUS at 19:46

## 2019-05-23 RX ADMIN — Medication 5 MILLIGRAM(S): at 17:08

## 2019-05-23 RX ADMIN — ATORVASTATIN CALCIUM 10 MILLIGRAM(S): 80 TABLET, FILM COATED ORAL at 21:42

## 2019-05-23 RX ADMIN — HEPARIN SODIUM 5000 UNIT(S): 5000 INJECTION INTRAVENOUS; SUBCUTANEOUS at 05:16

## 2019-05-23 RX ADMIN — CEFTRIAXONE 100 GRAM(S): 500 INJECTION, POWDER, FOR SOLUTION INTRAMUSCULAR; INTRAVENOUS at 06:25

## 2019-05-23 RX ADMIN — Medication 4: at 12:39

## 2019-05-23 RX ADMIN — SODIUM CHLORIDE 1000 MILLILITER(S): 9 INJECTION INTRAMUSCULAR; INTRAVENOUS; SUBCUTANEOUS at 09:27

## 2019-05-23 RX ADMIN — Medication 2: at 22:02

## 2019-05-23 RX ADMIN — GABAPENTIN 300 MILLIGRAM(S): 400 CAPSULE ORAL at 11:28

## 2019-05-23 RX ADMIN — HEPARIN SODIUM 5000 UNIT(S): 5000 INJECTION INTRAVENOUS; SUBCUTANEOUS at 21:42

## 2019-05-23 RX ADMIN — INSULIN GLARGINE 10 UNIT(S): 100 INJECTION, SOLUTION SUBCUTANEOUS at 00:03

## 2019-05-23 RX ADMIN — Medication 650 MILLIGRAM(S): at 09:27

## 2019-05-23 RX ADMIN — SENNA PLUS 1 TABLET(S): 8.6 TABLET ORAL at 00:03

## 2019-05-23 RX ADMIN — SERTRALINE 100 MILLIGRAM(S): 25 TABLET, FILM COATED ORAL at 11:28

## 2019-05-23 RX ADMIN — CEFTRIAXONE 100 GRAM(S): 500 INJECTION, POWDER, FOR SOLUTION INTRAMUSCULAR; INTRAVENOUS at 11:28

## 2019-05-23 RX ADMIN — Medication 100 MILLIGRAM(S): at 17:08

## 2019-05-23 RX ADMIN — Medication 5 MILLIGRAM(S): at 05:15

## 2019-05-23 RX ADMIN — POLYETHYLENE GLYCOL 3350 17 GRAM(S): 17 POWDER, FOR SOLUTION ORAL at 00:03

## 2019-05-23 RX ADMIN — Medication 650 MILLIGRAM(S): at 19:27

## 2019-05-23 RX ADMIN — SENNA PLUS 2 TABLET(S): 8.6 TABLET ORAL at 21:53

## 2019-05-23 RX ADMIN — Medication 650 MILLIGRAM(S): at 11:03

## 2019-05-23 NOTE — PROGRESS NOTE ADULT - SUBJECTIVE AND OBJECTIVE BOX
OVERNIGHT EVENTS: Admitted overnight, see HPI.     SUBJECTIVE / INTERVAL HPI: Patient seen and examined at bedside. Patient reports generalized weakness, headache this morning. Does have urinary frequency, but denies dysuria at this time. Has urinal at bedside.     VITAL SIGNS:  Vital Signs Last 24 Hrs  T(C): 38.3 (23 May 2019 09:00), Max: 38.3 (23 May 2019 09:00)  T(F): 101 (23 May 2019 09:00), Max: 101 (23 May 2019 09:00)  HR: 136 (23 May 2019 08:37) (68 - 136)  BP: 148/81 (23 May 2019 08:37) (139/64 - 169/77)  BP(mean): --  RR: 22 (23 May 2019 08:37) (17 - 22)  SpO2: 92% (23 May 2019 08:37) (92% - 96%)    PHYSICAL EXAM:    General: WDWN  HEENT: NC/AT; PERRL, anicteric sclera; MMM  Neck: supple  Cardiovascular: +S1/S2, RRR  Respiratory: CTA B/L; no W/R/R  Gastrointestinal: soft, NT/ND; +BSx4  Extremities: WWP; no edema, clubbing or cyanosis  Vascular: 2+ radial, DP/PT pulses B/L  Neurological: AAOx3; no focal deficits    MEDICATIONS:  MEDICATIONS  (STANDING):  amLODIPine   Tablet 10 milliGRAM(s) Oral every 24 hours  atorvastatin 10 milliGRAM(s) Oral at bedtime  cefTRIAXone   IVPB 1 Gram(s) IV Intermittent every 24 hours  gabapentin 300 milliGRAM(s) Oral daily  heparin  Injectable 5000 Unit(s) SubCutaneous every 8 hours  insulin glargine Injectable (LANTUS) 10 Unit(s) SubCutaneous at bedtime  insulin lispro (HumaLOG) corrective regimen sliding scale   SubCutaneous Before meals and at bedtime  oxybutynin 5 milliGRAM(s) Oral two times a day  sertraline 100 milliGRAM(s) Oral daily    MEDICATIONS  (PRN):  acetaminophen   Tablet .. 650 milliGRAM(s) Oral every 6 hours PRN Temp greater or equal to 38C (100.4F)      ALLERGIES:  Allergies    No Known Allergies    Intolerances        LABS:                        11.4   14.49 )-----------( 164      ( 23 May 2019 06:35 )             36.3     05-23    142  |  105  |  56<H>  ----------------------------<  116<H>  3.6   |  21<L>  |  3.30<H>    Ca    8.9      23 May 2019 06:35  Mg     1.8         TPro  7.3  /  Alb  2.5<L>  /  TBili  0.5  /  DBili  x   /  AST  50<H>  /  ALT  37  /  AlkPhos  137<H>      PT/INR - ( 22 May 2019 14:33 )   PT: 15.6 sec;   INR: 1.39          PTT - ( 22 May 2019 14:33 )  PTT:29.5 sec  Urinalysis Basic - ( 22 May 2019 15:47 )    Color: Yellow / Appearance: Clear / S.010 / pH: x  Gluc: x / Ketone: NEGATIVE  / Bili: NEGATIVE / Urobili: 0.2 E.U./dL   Blood: x / Protein: 100 mg/dL / Nitrite: POSITIVE   Leuk Esterase: Small / RBC: > 10 /HPF / WBC 5-10 /HPF   Sq Epi: x / Non Sq Epi: 5-10 /HPF / Bacteria: Present /HPF      CAPILLARY BLOOD GLUCOSE      POCT Blood Glucose.: 127 mg/dL (23 May 2019 08:17)      RADIOLOGY & ADDITIONAL TESTS: Reviewed. OVERNIGHT EVENTS: Admitted overnight, see HPI.     SUBJECTIVE / INTERVAL HPI: Patient seen and examined at bedside. Patient reports generalized weakness and mild headache this morning. Does have urinary frequency, but denies dysuria at this time. Has urinal at bedside. This AM, pt found to be febrile at 101F and tachycardic at 130s, given tylenol. Denies any lightheadedness, dizziness, chest pain, shortness of breath, palpitations, LE swelling, n/v/d, abd pain, or any other complaints at this time.     VITAL SIGNS:  Vital Signs Last 24 Hrs  T(C): 38.3 (23 May 2019 09:00), Max: 38.3 (23 May 2019 09:00)  T(F): 101 (23 May 2019 09:00), Max: 101 (23 May 2019 09:00)  HR: 136 (23 May 2019 08:37) (68 - 136)  BP: 148/81 (23 May 2019 08:37) (139/64 - 169/77)  BP(mean): --  RR: 22 (23 May 2019 08:37) (17 - 22)  SpO2: 92% (23 May 2019 08:37) (92% - 96%)    PHYSICAL EXAM:    General: WDWN, resting comfortably, tired appearing, no acute distress  HEENT: NC/AT; PERRL, anicteric sclera; MMM  Neck: supple  Cardiovascular: +S1/S2, RRR  Respiratory: CTA B/L; slightly tachypneic, no wheezes or rhonchi, mild bibasilar crackles, non-labored breathing, able to speak in full sentences.   Gastrointestinal: soft, obese, mild epigastric tenderness to palpation, softly distended, +BSx4  Extremities: WWP; no edema, clubbing or cyanosis  Vascular: 2+ radial, DP/PT pulses B/L  Neurological: AAOx3; no focal deficits/   MSK: 5/5 UE strength, 4/5 strength LE, sensation in tact    MEDICATIONS:  MEDICATIONS  (STANDING):  amLODIPine   Tablet 10 milliGRAM(s) Oral every 24 hours  atorvastatin 10 milliGRAM(s) Oral at bedtime  cefTRIAXone   IVPB 1 Gram(s) IV Intermittent every 24 hours  gabapentin 300 milliGRAM(s) Oral daily  heparin  Injectable 5000 Unit(s) SubCutaneous every 8 hours  insulin glargine Injectable (LANTUS) 10 Unit(s) SubCutaneous at bedtime  insulin lispro (HumaLOG) corrective regimen sliding scale   SubCutaneous Before meals and at bedtime  oxybutynin 5 milliGRAM(s) Oral two times a day  sertraline 100 milliGRAM(s) Oral daily    MEDICATIONS  (PRN):  acetaminophen   Tablet .. 650 milliGRAM(s) Oral every 6 hours PRN Temp greater or equal to 38C (100.4F)      ALLERGIES:  Allergies    No Known Allergies    Intolerances        LABS:                        11.4   14.49 )-----------( 164      ( 23 May 2019 06:35 )             36.3         142  |  105  |  56<H>  ----------------------------<  116<H>  3.6   |  21<L>  |  3.30<H>    Ca    8.9      23 May 2019 06:35  Mg     1.8         TPro  7.3  /  Alb  2.5<L>  /  TBili  0.5  /  DBili  x   /  AST  50<H>  /  ALT  37  /  AlkPhos  137<H>      PT/INR - ( 22 May 2019 14:33 )   PT: 15.6 sec;   INR: 1.39          PTT - ( 22 May 2019 14:33 )  PTT:29.5 sec  Urinalysis Basic - ( 22 May 2019 15:47 )    Color: Yellow / Appearance: Clear / S.010 / pH: x  Gluc: x / Ketone: NEGATIVE  / Bili: NEGATIVE / Urobili: 0.2 E.U./dL   Blood: x / Protein: 100 mg/dL / Nitrite: POSITIVE   Leuk Esterase: Small / RBC: > 10 /HPF / WBC 5-10 /HPF   Sq Epi: x / Non Sq Epi: 5-10 /HPF / Bacteria: Present /HPF      CAPILLARY BLOOD GLUCOSE      POCT Blood Glucose.: 127 mg/dL (23 May 2019 08:17)      RADIOLOGY & ADDITIONAL TESTS: Reviewed. OVERNIGHT EVENTS: Admitted overnight, see HPI.     SUBJECTIVE / INTERVAL HPI: Patient seen and examined at bedside. Patient reports generalized weakness and mild headache this morning. Does have urinary frequency, but denies dysuria at this time. Has urinal at bedside. This AM, pt found to be febrile at 101F and tachycardic at 130s, given tylenol. Denies any lightheadedness, dizziness, chest pain, shortness of breath, palpitations, LE swelling, n/v/d, abd pain, or any other complaints at this time.     VITAL SIGNS:  Vital Signs Last 24 Hrs  T(C): 38.3 (23 May 2019 09:00), Max: 38.3 (23 May 2019 09:00)  T(F): 101 (23 May 2019 09:00), Max: 101 (23 May 2019 09:00)  HR: 136 (23 May 2019 08:37) (68 - 136)  BP: 148/81 (23 May 2019 08:37) (139/64 - 169/77)  BP(mean): --  RR: 22 (23 May 2019 08:37) (17 - 22)  SpO2: 92% (23 May 2019 08:37) (92% - 96%)    PHYSICAL EXAM:    General: WDWN, resting comfortably, tired appearing, no acute distress  HEENT: NC/AT; PERRL, anicteric sclera; MMM  Neck: supple  Cardiovascular: +S1/S2, RRR  Respiratory: CTA B/L; slightly tachypneic, no wheezes or rhonchi, mild bibasilar crackles, non-labored breathing, able to speak in full sentences.   Gastrointestinal: soft, obese, mild epigastric tenderness to palpation, softly distended, +BSx4  : Condom cath in place, no enlarged prostate, no prostate tenderness   Extremities: WWP; no edema, clubbing or cyanosis  Vascular: 2+ radial, DP/PT pulses B/L  Neurological: AAOx3; no focal deficits/   MSK: 5/5 UE strength, 4/5 strength LE, sensation in tact    MEDICATIONS:  MEDICATIONS  (STANDING):  amLODIPine   Tablet 10 milliGRAM(s) Oral every 24 hours  atorvastatin 10 milliGRAM(s) Oral at bedtime  cefTRIAXone   IVPB 1 Gram(s) IV Intermittent every 24 hours  gabapentin 300 milliGRAM(s) Oral daily  heparin  Injectable 5000 Unit(s) SubCutaneous every 8 hours  insulin glargine Injectable (LANTUS) 10 Unit(s) SubCutaneous at bedtime  insulin lispro (HumaLOG) corrective regimen sliding scale   SubCutaneous Before meals and at bedtime  oxybutynin 5 milliGRAM(s) Oral two times a day  sertraline 100 milliGRAM(s) Oral daily    MEDICATIONS  (PRN):  acetaminophen   Tablet .. 650 milliGRAM(s) Oral every 6 hours PRN Temp greater or equal to 38C (100.4F)      ALLERGIES:  Allergies    No Known Allergies    Intolerances        LABS:                        11.4   14.49 )-----------( 164      ( 23 May 2019 06:35 )             36.3         142  |  105  |  56<H>  ----------------------------<  116<H>  3.6   |  21<L>  |  3.30<H>    Ca    8.9      23 May 2019 06:35  Mg     1.8         TPro  7.3  /  Alb  2.5<L>  /  TBili  0.5  /  DBili  x   /  AST  50<H>  /  ALT  37  /  AlkPhos  137<H>      PT/INR - ( 22 May 2019 14:33 )   PT: 15.6 sec;   INR: 1.39          PTT - ( 22 May 2019 14:33 )  PTT:29.5 sec  Urinalysis Basic - ( 22 May 2019 15:47 )    Color: Yellow / Appearance: Clear / S.010 / pH: x  Gluc: x / Ketone: NEGATIVE  / Bili: NEGATIVE / Urobili: 0.2 E.U./dL   Blood: x / Protein: 100 mg/dL / Nitrite: POSITIVE   Leuk Esterase: Small / RBC: > 10 /HPF / WBC 5-10 /HPF   Sq Epi: x / Non Sq Epi: 5-10 /HPF / Bacteria: Present /HPF      CAPILLARY BLOOD GLUCOSE      POCT Blood Glucose.: 127 mg/dL (23 May 2019 08:17)      RADIOLOGY & ADDITIONAL TESTS: Reviewed.

## 2019-05-23 NOTE — DIETITIAN INITIAL EVALUATION ADULT. - ADHERENCE
fair/as per patient does not really follow a strict DM diet but claims he checks his FS x2 a day and tries to avoid concentrated sweets.

## 2019-05-23 NOTE — CONSULT NOTE ADULT - SUBJECTIVE AND OBJECTIVE BOX
Patient is a 78y old  Male who presents with a chief complaint of Generalized weakness (23 May 2019 10:55)       HPI:  78M with h/o IDDM, HTN, OA BIBA to [facility] for generalized weakness since waking up this morning. He noticed having increased urinary frequency for the last two days and intermittent dysuria today. Denies hx of BPH or discomfort with voiding. Denies nasal congestion, increased sputum production or cough. Patient lives alone since his wife passed away last year, has no children. He used to work at a ware-house, currently retired. He is a former smoker, quit smoking like 15 years ago. Of note, patient was at [facility] ED four days ago after sliding off his cough and falling on his knee. X-ray showed b/l OA of the knees. Pt uses cane at baseline for ambulation. He has taking Tylenol and Motrin daily for pain control.      At [facility], vitals Tm 100.7, P95, 165/78, R18 - R22, Saturation 93%RA -> 95% on 2L NC. Pt received ceftriaxone 1g, azithromycin 500mg, Lasix 20mg iv, 3L NS, Tylenol 650mg. Pt transferred to [facility] for further care.     ROS: Has increased urinary frequency and occasional dysuria. Reports chronic back pain and b/l knee pain. Denies fever, chills, SOB, CP, palpitations, abdominal pain, n/v or diarrhea. Last BM yesterday. (22 May 2019 21:46)      PAST MEDICAL & SURGICAL HISTORY:  OA (osteoarthritis)  HTN (hypertension)  IDDM (insulin dependent diabetes mellitus)  S/P cholecystectomy  S/P appendectomy  H/O knee surgery      MEDICATIONS  (STANDING):  atorvastatin 10 milliGRAM(s) Oral at bedtime  docusate sodium 100 milliGRAM(s) Oral two times a day  furosemide   Injectable 40 milliGRAM(s) IV Push once  gabapentin 300 milliGRAM(s) Oral daily  heparin  Injectable 5000 Unit(s) SubCutaneous every 8 hours  insulin glargine Injectable (LANTUS) 10 Unit(s) SubCutaneous at bedtime  insulin lispro (HumaLOG) corrective regimen sliding scale   SubCutaneous Before meals and at bedtime  oxybutynin 5 milliGRAM(s) Oral two times a day  polyethylene glycol 3350 17 Gram(s) Oral daily  senna 2 Tablet(s) Oral at bedtime  sertraline 100 milliGRAM(s) Oral daily    MEDICATIONS  (PRN):  acetaminophen   Tablet .. 650 milliGRAM(s) Oral every 6 hours PRN Temp greater or equal to 38C (100.4F)      Social History:  x 1 year, retired ( worked in a warehouse),  lives alone in an elevator accessible apartment building    Functional Level Prior to Admission: ADL independent, walks with a cane    FAMILY HISTORY:  Family history of cancer of spinal cord      CBC Full  -  ( 23 May 2019 06:35 )  WBC Count : 14.49 K/uL  RBC Count : 4.38 M/uL  Hemoglobin : 11.4 g/dL  Hematocrit : 36.3 %  Platelet Count - Automated : 164 K/uL  Mean Cell Volume : 82.9 fl  Mean Cell Hemoglobin : 26.0 pg  Mean Cell Hemoglobin Concentration : 31.4 gm/dL  Auto Neutrophil # : x  Auto Lymphocyte # : x  Auto Monocyte # : x  Auto Eosinophil # : x  Auto Basophil # : x  Auto Neutrophil % : x  Auto Lymphocyte % : x  Auto Monocyte % : x  Auto Eosinophil % : x  Auto Basophil % : x          142  |  105  |  56<H>  ----------------------------<  116<H>  3.6   |  21<L>  |  3.30<H>    Ca    8.9      23 May 2019 06:35  Mg     1.8         TPro  7.3  /  Alb  2.5<L>  /  TBili  0.5  /  DBili  x   /  AST  50<H>  /  ALT  37  /  AlkPhos  137<H>        Urinalysis Basic - ( 22 May 2019 15:47 )    Color: Yellow / Appearance: Clear / S.010 / pH: x  Gluc: x / Ketone: NEGATIVE  / Bili: NEGATIVE / Urobili: 0.2 E.U./dL   Blood: x / Protein: 100 mg/dL / Nitrite: POSITIVE   Leuk Esterase: Small / RBC: > 10 /HPF / WBC 5-10 /HPF   Sq Epi: x / Non Sq Epi: 5-10 /HPF / Bacteria: Present /HPF          Radiology:    < from: Xray Chest 1 View AP/PA (19 @ 14:21) >  EXAM:  XR CHEST AP OR PA 1V                           PROCEDURE DATE:  2019          INTERPRETATION:  Portable Chest X-Ray dated 2019 2:21 PM    Indication: weakness, hypoxia    Prior studies: 2015    An AP portable view of the chestreveals magnified cardiac silhouette due   to AP supine portable positioning. Mild pulmonary venous congestion.   There is a linear density projects over the right paraspinal region of   uncertain etiology. Severe degenerative change of the glenohumeral joints.    IMPRESSION:  Linear density projects over the right paraspinal region of uncertain   etiology. Follow-up erect PA and lateral view of the chest or CT scan of   the chest is recommended.    Mild pulmonary venous congestion.              Vital Signs Last 24 Hrs  T(C): 37.1 (23 May 2019 15:01), Max: 38.3 (23 May 2019 09:00)  T(F): 98.7 (23 May 2019 15:01), Max: 101 (23 May 2019 09:00)  HR: 91 (23 May 2019 15:01) (68 - 136)  BP: 120/53 (23 May 2019 15:01) (120/53 - 169/77)  BP(mean): --  RR: 20 (23 May 2019 15:01) (17 - 22)  SpO2: 97% (23 May 2019 15:01) (92% - 97%)    REVIEW OF SYSTEMS:    CONSTITUTIONAL: fatigue  EYES: No eye pain, visual disturbances, or discharge  ENMT:  No difficulty hearing, tinnitus, vertigo; No sinus or throat pain  NECK: No pain or stiffness  BREASTS: No pain, masses, or nipple discharge  RESPIRATORY: No cough, wheezing, chills or hemoptysis; No shortness of breath  CARDIOVASCULAR: No chest pain, palpitations, dizziness, or leg swelling  GASTROINTESTINAL: No abdominal or epigastric pain. No nausea, vomiting, or hematemesis; No diarrhea or constipation. No melena or hematochezia.  GENITOURINARY: No dysuria, frequency, hematuria, or incontinence  NEUROLOGICAL: No headaches, memory loss, loss of strength, numbness, or tremors  SKIN: No itching, burning, rashes, or lesions   LYMPH NODES: No enlarged glands  ENDOCRINE: No heat or cold intolerance; No hair loss  MUSCULOSKELETAL: No joint pain or swelling; No muscle, back, or extremity pain  PSYCHIATRIC: No depression, anxiety, mood swings, or difficulty sleeping  HEME/LYMPH: No easy bruising, or bleeding gums  ALLERGY AND IMMUNOLOGIC: No hives or eczema  VASCULAR: no swelling, erythema      Physical Exam: 79 yo  gentleman lying in semi Chavis's position, c/o fatigue    Head: normocephalic, atraumatic    Eyes: PERRLA, EOMI, no nystagmus, sclera anicteric    ENT: nasal discharge, uvula midline, no oropharyngeal erythema/exudate    Neck: supple, negative JVD, negative carotid bruits, no thyromegaly    Chest: CTA bilaterally, neg wheeze, rhonchi, rales, crackles, egophany    Cardiovascular: regular rate and rhythm, neg murmurs/rubs/gallops    Abdomen: soft, non distended, non tender, negative rebound/guarding, normal bowel sounds, neg hepatosplenomegaly    Extremities: WWP, neg cyanosis/clubbing/edema, negative calf tenderness to palpation, negative Rigo's sign    :     Neurologic Exam:      Motor Exam:    Upper Extremities:     RIght:   5/5   /intrinsics               5/5  wrist flexors/extensors               5/5  biceps/triceps               5/5  deltoid               negative drift    Left :     5/5  /intrinsics               5/5  wrist flexors/extensors               5/5 biceps/triceps               5/5 deltoid               negative drift    Lower Extremities:                 Right:     5/5  DF/PF/ evertors/ invertors                5/5  Quad/ hamstrings                4-/5  hip flexors/adductors/abductors                 Left:       5/5  DF/PF/ evertors/ invertors                5/5  Quad/ hamstrings                4-/5  hip flexors/adductors/abductors                       Sensory:    intact to LT/PP in all UE/LE dermatomes    DTR:            = biceps/     triceps/     brachioradialis                      = patella/   medial hamstring/ankle                      neg clonus                      neg Babinski                        Gait:  not tested        PM&R Impression:    1) deconditioned  2) no focal weakness  3) h/o spinal stenosis/ OA knees        Recommendations:    1) Physical therapy focusing on therapeutic exercises, bed mobility/transfer out of bed evaluation, progressive ambulation with assistive devices prn.    2) Anticipated Disposition Plan/Recs: pending functional progress

## 2019-05-23 NOTE — PROGRESS NOTE ADULT - PROBLEM SELECTOR PLAN 5
Creatinine 3.67 on arrival, per PMD, baseline Cr 1.81, has diabetic nephropathy.  FeUrea c/w intrinsic renal injury Creatinine 3.67 on arrival, per PMD, baseline Cr 1.81-1.9, has diabetic nephropathy. FeUrea 49.6 c/w intrinsic renal injury. Hold Enalapril and Lasix at this time. s/p 3 L NS and 500 cc bolus this morning in setting of fever and tachycardia >130s.   - monitor I/Os   - trend BMP  - f/u retroperitoneal US to r/o obstruction/hydronephrosis   - avoid nephrotoxic agents    #Anion gap MA  -likely in the setting of MANAN  -monitor renal function  -plan as above.    #Troponinemia   - Trop 0.07 --> 0.08. patient without any symptoms of chest pains/shortness of breath, EKG with no acute ST/T-wave changes. Low suspicion for ACS. Likely secondary to demand ischemia in setting of sepsis/bacteremia   no need to trend cardiac enzymes further.

## 2019-05-23 NOTE — DIETITIAN INITIAL EVALUATION ADULT. - PROBLEM SELECTOR PLAN 5
-EKG: NSR at 84, normal axis, QTc 451, no ST changes  -likely 2/2 demand ischemia  -will trend cardiac enzymes to peak  -EKG in AM

## 2019-05-23 NOTE — DIETITIAN INITIAL EVALUATION ADULT. - PROBLEM SELECTOR PLAN 3
-sCr 3.67 on admission, baseline sCr unknown  -Etiology: pre-renal in the setting of sepsis vs intra-renal  -f/u urine lytes: FEUREA 49.6% suggestive of intrarenal  -holding lasix and enalapril  -f/u bladder scan: 94cc  -f/u retroperineal US to evaluate for anatomical abnormalities  -f/u collateral    #Anion gap MA  -likely in the setting of MANAN  -monitor renal function  -plan as above

## 2019-05-23 NOTE — PHYSICAL THERAPY INITIAL EVALUATION ADULT - CRITERIA FOR SKILLED THERAPEUTIC INTERVENTIONS
therapy frequency/functional limitations in following categories/impairments found/anticipated discharge recommendation/rehab potential

## 2019-05-23 NOTE — DIETITIAN INITIAL EVALUATION ADULT. - OTHER INFO
79 y/o male admitted with fevers/LHGV with weakness and inability to walk and urinary frequency.He is eating 80% of meals..No N/V/D or noted pain.Skin intact..Patient resides alone and admittedly is not always compliant with diabetic restrictions but tries to avoid concentrated sweets..No desserts.

## 2019-05-23 NOTE — PROVIDER CONTACT NOTE (CRITICAL VALUE NOTIFICATION) - SITUATION
Two blood culture bottles from 5/22: preliminary growth in aerobic and anaerobic bottles, gram negative rods

## 2019-05-23 NOTE — PHYSICAL THERAPY INITIAL EVALUATION ADULT - DIAGNOSIS, PT EVAL
4C: Impaired Muscle Performance 6B: Impaired Aerobic Capacity/Endurance Associated with Deconditioning

## 2019-05-23 NOTE — DIETITIAN INITIAL EVALUATION ADULT. - PROBLEM SELECTOR PLAN 7
-takes humulin 70/30 10units qd at home  -will start with lantus 10qhs and ISS  -monitor FS and titrate as needed

## 2019-05-23 NOTE — DIETITIAN INITIAL EVALUATION ADULT. - PROBLEM SELECTOR PLAN 6
-takes norvasc 10qd, enalapril 10mg qd, lasix 80mg qd at home  -will restart norvasc 10qd    #Microcytic Anemia  -likely ACD vs TORREY  -f/u iron studies  -monitor CBC

## 2019-05-23 NOTE — DIETITIAN INITIAL EVALUATION ADULT. - PROBLEM SELECTOR PLAN 1
-reports increased urinary frequency and occasional dysuria, UA positive  -s/p ceftriaxone and azithromycin at [facility]  -will continue ceftriaxone   -f/u urine culture  -f/u retroperineal US to evaluate for anatomical abnormalities    #Generalized weakness  -DDx: 2/2 infection vs arthritis vs deconditioning  -reports chronic back pain, but has muscle strength 5/5 in all four extremities, good rectal tone  -PT evaluation

## 2019-05-23 NOTE — DIETITIAN INITIAL EVALUATION ADULT. - PROBLEM SELECTOR PLAN 10
1) PCP Contacted on Admission: (Y/N) --> No. Name & Phone #: Dr. Bassem Lundberg, [phone]  2) Date of Contact with PCP:  3) PCP Contacted at Discharge: (Y/N)  4) Summary of Handoff Given to PCP:   5) Post-Discharge Appointment Date and Location:

## 2019-05-23 NOTE — PHYSICAL THERAPY INITIAL EVALUATION ADULT - ADDITIONAL COMMENTS
Patient lives in elevator apartment building with no steps. Patient has grab bars in the bathroom and uses a  and RW. Patient has no home health aid or outside assistance. Does not use O2 at baseline.

## 2019-05-23 NOTE — PHYSICAL THERAPY INITIAL EVALUATION ADULT - LEVEL OF INDEPENDENCE: SIT/SUPINE, REHAB EVAL
----- Message from Maureen Cabral MD sent at 4/22/2019  6:23 PM CDT -----  The following message sent to patient via [facility]:   Your white blood count was just resulted a few minutes ago as having dropped.  It is still within a safe level.  I will notify study team in case they wish to change your medication.  
Results reviewed with patient.  Neutropenic precautions reviewed.  Plan repeat CBC on 4/25/19.  
moderate assist (50% patients effort)

## 2019-05-23 NOTE — PHYSICAL THERAPY INITIAL EVALUATION ADULT - PERTINENT HX OF CURRENT PROBLEM, REHAB EVAL
78M with h/o IDDM, HTN, OA BIBA to [facility] for generalized weakness, found to have sepsis 2/2 UTI.

## 2019-05-23 NOTE — DIETITIAN INITIAL EVALUATION ADULT. - PROBLEM SELECTOR PLAN 2
-meets 2/4 sepsis criteria (Tm 100.7, WBC 15, lactate <2)  -Etiology: UTI vs viral. Less likely CAP.   -s/p 3L NS in the ED  -f/u blood culture  -f/u urine culture  -f/u RVP  -reperfusion exam perfomed

## 2019-05-23 NOTE — PROGRESS NOTE ADULT - PROBLEM SELECTOR PLAN 4
Pt with h/o HTN, on Amlodipine, Enalapril, Pt with h/o HTN, on Amlodipine, Enalapril. Currently -140s. Will hold antihypertensives in setting of sepsis.   - continue to monitor Pt with h/o HTN, on Amlodipine, Enalapril. Currently -140s. Will hold antihypertensives in setting of sepsis.   - continue to monitor      #Microcytic Anemia - Iron studies w/ mixed TORREY and ACD  - transfuse if Hb <7  - monitor H/H  - maintain active type and screen

## 2019-05-23 NOTE — PROGRESS NOTE ADULT - PROBLEM SELECTOR PLAN 7
Has chronic OA of bilateral knees, uses cane for ambulation. States his ambulation has been deteriorating with worsening pain. Takes tylenol/motrin daily for pain control .Xrays inpatient c/w OA.   - PT eugenio Takes Humulin 70/30, and 10 lantus at home  - MISS  - c/w Lantus 10 units qhs- will uptitrate if needed  - carb consistent diet Takes Humulin 70/30, and 10 lantus at home. A1c 7.3, relatively controlled   - MISS  - c/w Lantus 10 units qhs- will uptitrate if needed  - carb consistent diet Takes Humulin 70/30, and 10 lantus at home. A1c 7.3, relatively controlled. With diabetic neuropathy  - MISS  - c/w Lantus 10 units qhs- will uptitrate if needed  - carb consistent diet  - continue Gabapentin     Constipation   -

## 2019-05-23 NOTE — PROGRESS NOTE ADULT - PROBLEM SELECTOR PLAN 10
1) PCP Contacted on Admission: (Y/N) --> Name & Phone #: Dr. Lundberg [phone]  Dr. Neil [phone]  2) Date of Contact with PCP:  3) PCP Contacted at Discharge: (Y/N, N/A)  4) Summary of Handoff Given to PCP:   5) Post-Discharge Appointment Date and Location:

## 2019-05-23 NOTE — PHYSICAL THERAPY INITIAL EVALUATION ADULT - GAIT DEVIATIONS NOTED, PT EVAL
decreased step length/decreased velocity of limb motion/decreased weight-shifting ability/decreased stride length/increased time in double stance/decreased bee

## 2019-05-23 NOTE — CONSULT NOTE ADULT - ASSESSMENT
78M with h/o IDDM, HTN, OA BIBA to [facility] for generalized weakness, found to have sepsis 2/2 UTI, now with E.coli bacteremia, being treated with Ceftriaxone (started 5/22- ).    Problem/Plan - 1:  ·  Problem: Sepsis.  Plan: Present on arrival, T 100.7, HR 90s,, RR >20, with WBC 15. Secondary urinary tract infection c/b gram negative bacteremia. Adequately fluid resuscitated with 3L NS, s/p CTX/Azithromycin. CXR with no focal consolidation, however does have pulmonary congestion, but not significantly changed from previous.   - send UCx  - f/u repeat BCx and f/u speciation and sensitivities   - c/w CTX 2g q24h for coverage of bacteremia (5/22- )    #Sinus tachycardia - HR 130s today, likely due to fevers. EKG with Sinus tachycardia, LVH, no acute ischemic changes.   - tylenol PRN for fevers  - continue to monitor    #Transaminits - , AST 50, may be in setting of sepsis  - f/u repeat CMP.     Problem/Plan - 2:  ·  Problem: Complicated urinary tract infection.  Plan: see plan as below.     Problem/Plan - 3:  ·  Problem: Bacteremia due to Gram-negative bacteria.  Plan: BCx prelim with GNR, likely 2/2 UTI.   - repeat blood cultures 5/24.   - c/w CTX 2g q24 (started 5/22- ).     Problem/Plan - 4:  ·  Problem: HTN (hypertension).  Plan: Pt with h/o HTN, on Amlodipine, Enalapril. Currently -140s. Will hold antihypertensives in setting of sepsis.   - continue to monitor      #Microcytic Anemia - Iron studies w/ mixed TORREY and ACD  - transfuse if Hb <7  - monitor H/H  - maintain active type and screen.     Problem/Plan - 5:  ·  Problem: Acute kidney injury superimposed on CKD.  Plan: Creatinine 3.67 on arrival, per PMD, baseline Cr 1.81-1.9, has diabetic nephropathy. FeUrea 49.6 c/w intrinsic renal injury. Hold Enalapril and Lasix at this time. s/p 3 L NS and 500 cc bolus this morning in setting of fever and tachycardia >130s.   - monitor I/Os   - trend BMP  - f/u retroperitoneal US to r/o obstruction/hydronephrosis   - avoid nephrotoxic agents    #Anion gap MA  -likely in the setting of MANAN  -monitor renal function  -plan as above.    #Troponinemia   - Trop 0.07 --> 0.08. patient without any symptoms of chest pains/shortness of breath, EKG with no acute ST/T-wave changes. Low suspicion for ACS. Likely secondary to demand ischemia in setting of sepsis/bacteremia   no need to trend cardiac enzymes further.     Problem/Plan - 6:  Problem: CAD (coronary artery disease). Plan: Pt with h/o CAD, non-obstructive, no h/o stents. No history of CHF. Follows up with Dr. Neil, last echo from 03/2019 with EF 50-55%, LVH, no other WMA. Medications include amlodipine, enalapril, lasix   - Echo from this AM with moderate global hypokinesis, concentric LVH, EF 35% however patient noted to be tachycardic >130s during exam, and patient is septic.  - will need repeat echo outpatient.  - continue lipitor 10mg qhs.    Problem/Plan - 7:  ·  Problem: IDDM (insulin dependent diabetes mellitus).  Plan: Takes Humulin 70/30, and 10 lantus at home. A1c 7.3, relatively controlled. With diabetic neuropathy  - MISS  - c/w Lantus 10 units qhs- will uptitrate if needed  - carb consistent diet  - continue Gabapentin     Constipation   -.     Problem/Plan - 8:  ·  Problem: Osteoarthritis.  Plan: Has chronic OA of bilateral knees, uses cane for ambulation. States his ambulation has been deteriorating with worsening pain. Takes tylenol/motrin daily for pain control .Xrays inpatient c/w OA.   - PT eval    # Generalized weakness- likely multifactorial with current infection with bacteremia, vs. deconditioning with underlying arthritis, has chronic back pain. muscle strength 5/5 in upper extremities but 4/5 in lower extremities.   - PT eval    #Depression  - continue Sertraline 100mg qd     #Overactive bladder   - continue home oxybutynin 5mg qd.     Problem/Plan - 9:  ·  Problem: Nutrition, metabolism, and development symptoms.  Plan: F- no IVF, s/p 2L NS  E-replete as needed  N- DASH/TLC, carb consistent   C- FULL CODE

## 2019-05-23 NOTE — PROGRESS NOTE ADULT - PROBLEM SELECTOR PLAN 3
BCx prelim with GNR, likely 2/2 UTI.   - repeat blood cultures 5/24.   - c/w CTX 2g q24 (started 5/23- ) BCx prelim with GNR, likely 2/2 UTI.   - repeat blood cultures 5/24.   - c/w CTX 2g q24 (started 5/22- )

## 2019-05-23 NOTE — PROGRESS NOTE ADULT - PROBLEM SELECTOR PLAN 9
1) PCP Contacted on Admission: (Y/N) --> Name & Phone #:  2) Date of Contact with PCP:  3) PCP Contacted at Discharge: (Y/N, N/A)  4) Summary of Handoff Given to PCP:   5) Post-Discharge Appointment Date and Location: F- no IVF, s/p 2L NS  E-replete as needed  N- DASH/TLC, carb consistent   C- FULL CODE  DVt ppx: HSQ

## 2019-05-23 NOTE — DIETITIAN INITIAL EVALUATION ADULT. - NS AS NUTRI DX KNOWLEDGE BELIEFS
Food - and nutrition - related knowledge deficit/Disordered eating pattern/Limited adherence to nutrition - related recommendations

## 2019-05-23 NOTE — PROGRESS NOTE ADULT - PROBLEM SELECTOR PLAN 6
Pt with h/o CAD, non-obstructive, no h/o stents. No history of CHF. Follows up with Dr. Neil, last echo from 03/2019 with EF 50-55%, LVH, no other WMA. Medications include amlodipine, enalapril, lasix   - Echo from this AM with moderate global hypokinesis, concentric LVH, EF 35% however patient noted to be tachycardic >130s during exam, and patient is septic.  - will need repeat echo outpatient. Pt with h/o CAD, non-obstructive, no h/o stents. No history of CHF. Follows up with Dr. Neil, last echo from 03/2019 with EF 50-55%, LVH, no other WMA. Medications include amlodipine, enalapril, lasix   - Echo from this AM with moderate global hypokinesis, concentric LVH, EF 35% however patient noted to be tachycardic >130s during exam, and patient is septic.  - will need repeat echo outpatient.  - continue lipitor 10mg qhs

## 2019-05-23 NOTE — PHYSICAL THERAPY INITIAL EVALUATION ADULT - RANGE OF MOTION EXAMINATION, REHAB EVAL
Left UE ROM was WFL (within functional limits)/bilateral lower extremity ROM was WFL (within functional limits)/deficits as listed below/r shoulder flexion limited to 90

## 2019-05-23 NOTE — PHYSICAL THERAPY INITIAL EVALUATION ADULT - IMPAIRMENTS FOUND, PT EVAL
ventilation and respiration/gas exchange/aerobic capacity/endurance/muscle strength/gross motor/gait, locomotion, and balance

## 2019-05-23 NOTE — PROGRESS NOTE ADULT - ASSESSMENT
78M with h/o IDDM, HTN, OA BIBA to [facility] for generalized weakness, found to have sepsis 2/2 UTI, now with E.coli bacteremia, being treated with Ceftriaxone (started 5/22- ).

## 2019-05-23 NOTE — DIETITIAN INITIAL EVALUATION ADULT. - PROBLEM SELECTOR PLAN 4
-unclear if patient has hx of CHF, patient follows with cardiologist  -takes lasix 80mg qd at home  -received 3L NS at [facility]   -s/p lasix 20iv in the ED  -will hold lasix in the setting of sepsis

## 2019-05-23 NOTE — PROGRESS NOTE ADULT - PROBLEM SELECTOR PLAN 1
Present on arrival, T 100.7, HR 90s,, RR >20, with WBC 15. Secondary urinary tract infection c/b gram negative bacteremia. Adequately fluid resuscitated with 3L NS, s/p CTX/Azithromycin, Present on arrival, T 100.7, HR 90s,, RR >20, with WBC 15. Secondary urinary tract infection c/b gram negative bacteremia. Adequately fluid resuscitated with 3L NS, s/p CTX/Azithromycin. CXR with no focal consolidation, however does have pulmonary congestion, but not significantly changed from previous.   - send UCx  - f/u repeat BCx and f/u speciation and sensitivities   - c/w CTX 2g q24h for coverage of bacteremia Present on arrival, T 100.7, HR 90s,, RR >20, with WBC 15. Secondary urinary tract infection c/b gram negative bacteremia. Adequately fluid resuscitated with 3L NS, s/p CTX/Azithromycin. CXR with no focal consolidation, however does have pulmonary congestion, but not significantly changed from previous.   - send UCx  - f/u repeat BCx and f/u speciation and sensitivities   - c/w CTX 2g q24h for coverage of bacteremia (5/22- ) Present on arrival, T 100.7, HR 90s,, RR >20, with WBC 15. Secondary urinary tract infection c/b gram negative bacteremia. Adequately fluid resuscitated with 3L NS, s/p CTX/Azithromycin. CXR with no focal consolidation, however does have pulmonary congestion, but not significantly changed from previous.   - send UCx  - f/u repeat BCx and f/u speciation and sensitivities   - c/w CTX 2g q24h for coverage of bacteremia (5/22- )    #Sinus tachycardia - HR 130s today, likely due to fevers. EKG with Sinus tachycardia, LVH, no acute ischemic changes.   - tylenol PRN for fevers  - continue to monitor Present on arrival, T 100.7, HR 90s,, RR >20, with WBC 15. Secondary urinary tract infection c/b gram negative bacteremia. Adequately fluid resuscitated with 3L NS, s/p CTX/Azithromycin. CXR with no focal consolidation, however does have pulmonary congestion, but not significantly changed from previous.   - send UCx  - f/u repeat BCx and f/u speciation and sensitivities   - c/w CTX 2g q24h for coverage of bacteremia (5/22- )    #Sinus tachycardia - HR 130s today, likely due to fevers. EKG with Sinus tachycardia, LVH, no acute ischemic changes.   - tylenol PRN for fevers  - continue to monitor    #Transaminits - , AST 50, may be in setting of sepsis  - f/u repeat CMP

## 2019-05-24 DIAGNOSIS — I48.92 UNSPECIFIED ATRIAL FLUTTER: ICD-10-CM

## 2019-05-24 LAB
ALBUMIN SERPL ELPH-MCNC: 2.5 G/DL — LOW (ref 3.3–5)
ALP SERPL-CCNC: 145 U/L — HIGH (ref 40–120)
ALT FLD-CCNC: 26 U/L — SIGNIFICANT CHANGE UP (ref 10–45)
ANION GAP SERPL CALC-SCNC: 18 MMOL/L — HIGH (ref 5–17)
ANION GAP SERPL CALC-SCNC: 20 MMOL/L — HIGH (ref 5–17)
AST SERPL-CCNC: 27 U/L — SIGNIFICANT CHANGE UP (ref 10–40)
BASOPHILS # BLD AUTO: 0.02 K/UL — SIGNIFICANT CHANGE UP (ref 0–0.2)
BASOPHILS NFR BLD AUTO: 0.1 % — SIGNIFICANT CHANGE UP (ref 0–2)
BILIRUB SERPL-MCNC: 0.3 MG/DL — SIGNIFICANT CHANGE UP (ref 0.2–1.2)
BUN SERPL-MCNC: 52 MG/DL — HIGH (ref 7–23)
BUN SERPL-MCNC: 59 MG/DL — HIGH (ref 7–23)
CALCIUM SERPL-MCNC: 9.3 MG/DL — SIGNIFICANT CHANGE UP (ref 8.4–10.5)
CALCIUM SERPL-MCNC: 9.4 MG/DL — SIGNIFICANT CHANGE UP (ref 8.4–10.5)
CHLORIDE SERPL-SCNC: 99 MMOL/L — SIGNIFICANT CHANGE UP (ref 96–108)
CHLORIDE SERPL-SCNC: 99 MMOL/L — SIGNIFICANT CHANGE UP (ref 96–108)
CO2 SERPL-SCNC: 19 MMOL/L — LOW (ref 22–31)
CO2 SERPL-SCNC: 23 MMOL/L — SIGNIFICANT CHANGE UP (ref 22–31)
CREAT SERPL-MCNC: 3.17 MG/DL — HIGH (ref 0.5–1.3)
CREAT SERPL-MCNC: 3.3 MG/DL — HIGH (ref 0.5–1.3)
CULTURE RESULTS: NO GROWTH — SIGNIFICANT CHANGE UP
EOSINOPHIL # BLD AUTO: 0.06 K/UL — SIGNIFICANT CHANGE UP (ref 0–0.5)
EOSINOPHIL NFR BLD AUTO: 0.4 % — SIGNIFICANT CHANGE UP (ref 0–6)
GLUCOSE SERPL-MCNC: 172 MG/DL — HIGH (ref 70–99)
GLUCOSE SERPL-MCNC: 189 MG/DL — HIGH (ref 70–99)
HCT VFR BLD CALC: 37.3 % — LOW (ref 39–50)
HCT VFR BLD CALC: 40.3 % — SIGNIFICANT CHANGE UP (ref 39–50)
HGB BLD-MCNC: 11.8 G/DL — LOW (ref 13–17)
HGB BLD-MCNC: 13 G/DL — SIGNIFICANT CHANGE UP (ref 13–17)
IMM GRANULOCYTES NFR BLD AUTO: 3.6 % — HIGH (ref 0–1.5)
LACTATE SERPL-SCNC: 1.5 MMOL/L — SIGNIFICANT CHANGE UP (ref 0.5–2)
LYMPHOCYTES # BLD AUTO: 1.52 K/UL — SIGNIFICANT CHANGE UP (ref 1–3.3)
LYMPHOCYTES # BLD AUTO: 11.3 % — LOW (ref 13–44)
MAGNESIUM SERPL-MCNC: 1.8 MG/DL — SIGNIFICANT CHANGE UP (ref 1.6–2.6)
MCHC RBC-ENTMCNC: 25.5 PG — LOW (ref 27–34)
MCHC RBC-ENTMCNC: 26.4 PG — LOW (ref 27–34)
MCHC RBC-ENTMCNC: 31.6 GM/DL — LOW (ref 32–36)
MCHC RBC-ENTMCNC: 32.3 GM/DL — SIGNIFICANT CHANGE UP (ref 32–36)
MCV RBC AUTO: 80.7 FL — SIGNIFICANT CHANGE UP (ref 80–100)
MCV RBC AUTO: 81.9 FL — SIGNIFICANT CHANGE UP (ref 80–100)
MONOCYTES # BLD AUTO: 1.31 K/UL — HIGH (ref 0–0.9)
MONOCYTES NFR BLD AUTO: 9.7 % — SIGNIFICANT CHANGE UP (ref 2–14)
NEUTROPHILS # BLD AUTO: 10.1 K/UL — HIGH (ref 1.8–7.4)
NEUTROPHILS NFR BLD AUTO: 74.9 % — SIGNIFICANT CHANGE UP (ref 43–77)
NRBC # BLD: 0 /100 WBCS — SIGNIFICANT CHANGE UP (ref 0–0)
NRBC # BLD: 0 /100 WBCS — SIGNIFICANT CHANGE UP (ref 0–0)
PLATELET # BLD AUTO: 193 K/UL — SIGNIFICANT CHANGE UP (ref 150–400)
PLATELET # BLD AUTO: 210 K/UL — SIGNIFICANT CHANGE UP (ref 150–400)
POTASSIUM SERPL-MCNC: 3.4 MMOL/L — LOW (ref 3.5–5.3)
POTASSIUM SERPL-MCNC: 3.7 MMOL/L — SIGNIFICANT CHANGE UP (ref 3.5–5.3)
POTASSIUM SERPL-SCNC: 3.4 MMOL/L — LOW (ref 3.5–5.3)
POTASSIUM SERPL-SCNC: 3.7 MMOL/L — SIGNIFICANT CHANGE UP (ref 3.5–5.3)
PROT SERPL-MCNC: 7.3 G/DL — SIGNIFICANT CHANGE UP (ref 6–8.3)
RBC # BLD: 4.62 M/UL — SIGNIFICANT CHANGE UP (ref 4.2–5.8)
RBC # BLD: 4.92 M/UL — SIGNIFICANT CHANGE UP (ref 4.2–5.8)
RBC # FLD: 15.2 % — HIGH (ref 10.3–14.5)
RBC # FLD: 15.3 % — HIGH (ref 10.3–14.5)
SODIUM SERPL-SCNC: 138 MMOL/L — SIGNIFICANT CHANGE UP (ref 135–145)
SODIUM SERPL-SCNC: 140 MMOL/L — SIGNIFICANT CHANGE UP (ref 135–145)
SPECIMEN SOURCE: SIGNIFICANT CHANGE UP
WBC # BLD: 13.12 K/UL — HIGH (ref 3.8–10.5)
WBC # BLD: 13.5 K/UL — HIGH (ref 3.8–10.5)
WBC # FLD AUTO: 13.12 K/UL — HIGH (ref 3.8–10.5)
WBC # FLD AUTO: 13.5 K/UL — HIGH (ref 3.8–10.5)

## 2019-05-24 PROCEDURE — 76770 US EXAM ABDO BACK WALL COMP: CPT | Mod: 26

## 2019-05-24 PROCEDURE — 71045 X-RAY EXAM CHEST 1 VIEW: CPT | Mod: 26

## 2019-05-24 PROCEDURE — 74176 CT ABD & PELVIS W/O CONTRAST: CPT | Mod: 26

## 2019-05-24 PROCEDURE — 93010 ELECTROCARDIOGRAM REPORT: CPT

## 2019-05-24 PROCEDURE — 99233 SBSQ HOSP IP/OBS HIGH 50: CPT | Mod: GC

## 2019-05-24 RX ORDER — APIXABAN 2.5 MG/1
2.5 TABLET, FILM COATED ORAL EVERY 12 HOURS
Refills: 0 | Status: DISCONTINUED | OUTPATIENT
Start: 2019-05-24 | End: 2019-05-27

## 2019-05-24 RX ORDER — METOPROLOL TARTRATE 50 MG
12.5 TABLET ORAL
Refills: 0 | Status: DISCONTINUED | OUTPATIENT
Start: 2019-05-24 | End: 2019-05-25

## 2019-05-24 RX ORDER — FUROSEMIDE 40 MG
40 TABLET ORAL ONCE
Refills: 0 | Status: COMPLETED | OUTPATIENT
Start: 2019-05-24 | End: 2019-05-24

## 2019-05-24 RX ORDER — METOPROLOL TARTRATE 50 MG
5 TABLET ORAL ONCE
Refills: 0 | Status: COMPLETED | OUTPATIENT
Start: 2019-05-24 | End: 2019-05-24

## 2019-05-24 RX ADMIN — Medication 100 MILLIGRAM(S): at 17:30

## 2019-05-24 RX ADMIN — SERTRALINE 100 MILLIGRAM(S): 25 TABLET, FILM COATED ORAL at 13:21

## 2019-05-24 RX ADMIN — Medication 100 MILLIGRAM(S): at 06:45

## 2019-05-24 RX ADMIN — SENNA PLUS 2 TABLET(S): 8.6 TABLET ORAL at 22:19

## 2019-05-24 RX ADMIN — Medication 2: at 08:34

## 2019-05-24 RX ADMIN — CEFTRIAXONE 100 GRAM(S): 500 INJECTION, POWDER, FOR SOLUTION INTRAMUSCULAR; INTRAVENOUS at 06:45

## 2019-05-24 RX ADMIN — INSULIN GLARGINE 10 UNIT(S): 100 INJECTION, SOLUTION SUBCUTANEOUS at 22:19

## 2019-05-24 RX ADMIN — Medication 2: at 17:30

## 2019-05-24 RX ADMIN — GABAPENTIN 300 MILLIGRAM(S): 400 CAPSULE ORAL at 16:00

## 2019-05-24 RX ADMIN — ATORVASTATIN CALCIUM 10 MILLIGRAM(S): 80 TABLET, FILM COATED ORAL at 22:18

## 2019-05-24 RX ADMIN — Medication 40 MILLIGRAM(S): at 09:56

## 2019-05-24 RX ADMIN — Medication 5 MILLIGRAM(S): at 17:30

## 2019-05-24 RX ADMIN — HEPARIN SODIUM 5000 UNIT(S): 5000 INJECTION INTRAVENOUS; SUBCUTANEOUS at 06:46

## 2019-05-24 RX ADMIN — APIXABAN 2.5 MILLIGRAM(S): 2.5 TABLET, FILM COATED ORAL at 17:32

## 2019-05-24 RX ADMIN — Medication 2: at 22:19

## 2019-05-24 RX ADMIN — HEPARIN SODIUM 5000 UNIT(S): 5000 INJECTION INTRAVENOUS; SUBCUTANEOUS at 13:21

## 2019-05-24 RX ADMIN — POLYETHYLENE GLYCOL 3350 17 GRAM(S): 17 POWDER, FOR SOLUTION ORAL at 13:21

## 2019-05-24 RX ADMIN — Medication 5 MILLIGRAM(S): at 06:45

## 2019-05-24 RX ADMIN — Medication 5 MILLIGRAM(S): at 09:56

## 2019-05-24 RX ADMIN — Medication 12.5 MILLIGRAM(S): at 13:20

## 2019-05-24 RX ADMIN — Medication 2: at 12:46

## 2019-05-24 RX ADMIN — Medication 650 MILLIGRAM(S): at 06:45

## 2019-05-24 RX ADMIN — Medication 12.5 MILLIGRAM(S): at 17:32

## 2019-05-24 NOTE — PROGRESS NOTE ADULT - PROBLEM SELECTOR PLAN 7
Takes Humulin 70/30, and 10 lantus at home. A1c 7.3, relatively controlled. With diabetic neuropathy  - MISS  - c/w Lantus 10 units qhs- will uptitrate if needed  - carb consistent diet  - continue Gabapentin     Constipation   - continue colace, senna, miralax Takes Humulin 70/30, and 10 lantus at home. A1c 7.3, relatively controlled. With diabetic neuropathy  - MISS  - c/w Lantus 10 units qhs- will uptitrate if needed  - carb consistent diet  - continue Gabapentin     #Constipation   - continue colace, senna, miralax    #Hypoalbuminemia  - Nutrition consult

## 2019-05-24 NOTE — PROGRESS NOTE ADULT - PROBLEM SELECTOR PLAN 9
F- no IVF, s/p 2L NS  E-replete as needed  N- DASH/TLC, carb consistent   C- FULL CODE  DVt ppx: HSQ

## 2019-05-24 NOTE — PROGRESS NOTE ADULT - SUBJECTIVE AND OBJECTIVE BOX
OVERNIGHT EVENTS: Pt continued to spike fevers overnight, with Tmax of 101F (rectal) this AM, tachycardic to HR up to 130s, received 250cc bolus along with tylenol.     SUBJECTIVE / INTERVAL HPI: Patient seen and examined at bedside. This morning, he reports generalized fatigue, fevers, chills. States he has been urinating. States he used to have intermittent leg swelling and that is why he was taking Lasix.   States his breathing is unchanged, denies any chest pain, shortness of breath, n/v/d, abd pain, dysuria, or any other complaints. Worked with Physical therapy yesterday but was not able to ambulate too far, does not wish to go to rehab, would rather go home.     VITAL SIGNS:  Vital Signs Last 24 Hrs  T(C): 38.3 (24 May 2019 06:32), Max: 38.3 (23 May 2019 09:00)  T(F): 101 (24 May 2019 06:32), Max: 101 (23 May 2019 09:00)  HR: 129 (24 May 2019 05:42) (91 - 136)  BP: 160/89 (24 May 2019 05:42) (120/53 - 160/91)  BP(mean): --  RR: 22 (24 May 2019 05:42) (18 - 22)  SpO2: 95% (24 May 2019 05:42) (92% - 97%)    PHYSICAL EXAM:    General: WDWN, resting comfortably, tired appearing, no acute distress  HEENT: NC/AT; PERRL, anicteric sclera; MMM  Neck: supple  Cardiovascular: +S1/S2, RRR  Respiratory: CTA B/L; slightly tachypneic, no wheezes or rhonchi, mild bibasilar crackles, non-labored breathing, able to speak in full sentences. Nasal canula in place   Gastrointestinal: soft, obese, mild epigastric tenderness to palpation, softly distended, +BSx4  : Condom cath in place, no enlarged prostate, no prostate tenderness   Extremities: WWP; no edema, clubbing or cyanosis  Vascular: 2+ radial, DP/PT pulses B/L  Neurological: AAOx3; no focal deficits/   MSK: 5/5 UE strength, 4/5 strength LE, sensation in tact      MEDICATIONS:  MEDICATIONS  (STANDING):  atorvastatin 10 milliGRAM(s) Oral at bedtime  cefTRIAXone   IVPB 2 Gram(s) IV Intermittent every 24 hours  docusate sodium 100 milliGRAM(s) Oral two times a day  gabapentin 300 milliGRAM(s) Oral daily  heparin  Injectable 5000 Unit(s) SubCutaneous every 8 hours  insulin glargine Injectable (LANTUS) 10 Unit(s) SubCutaneous at bedtime  insulin lispro (HumaLOG) corrective regimen sliding scale   SubCutaneous Before meals and at bedtime  oxybutynin 5 milliGRAM(s) Oral two times a day  polyethylene glycol 3350 17 Gram(s) Oral daily  senna 2 Tablet(s) Oral at bedtime  sertraline 100 milliGRAM(s) Oral daily    MEDICATIONS  (PRN):  acetaminophen   Tablet .. 650 milliGRAM(s) Oral every 6 hours PRN Temp greater or equal to 38C (100.4F)      ALLERGIES:  Allergies    No Known Allergies    Intolerances        LABS:                        11.8   13.50 )-----------( 193      ( 24 May 2019 07:56 )             37.3         139  |  102  |  47<H>  ----------------------------<  198<H>  3.7   |  20<L>  |  3.12<H>    Ca    9.0      23 May 2019 16:45  Phos  4.1       Mg     1.9         TPro  7.4  /  Alb  2.8<L>  /  TBili  0.3  /  DBili  x   /  AST  28  /  ALT  28  /  AlkPhos  140<H>  23    PT/INR - ( 22 May 2019 14:33 )   PT: 15.6 sec;   INR: 1.39          PTT - ( 22 May 2019 14:33 )  PTT:29.5 sec  Urinalysis Basic - ( 22 May 2019 15:47 )    Color: Yellow / Appearance: Clear / S.010 / pH: x  Gluc: x / Ketone: NEGATIVE  / Bili: NEGATIVE / Urobili: 0.2 E.U./dL   Blood: x / Protein: 100 mg/dL / Nitrite: POSITIVE   Leuk Esterase: Small / RBC: > 10 /HPF / WBC 5-10 /HPF   Sq Epi: x / Non Sq Epi: 5-10 /HPF / Bacteria: Present /HPF      CAPILLARY BLOOD GLUCOSE      POCT Blood Glucose.: 172 mg/dL (23 May 2019 21:57)      RADIOLOGY & ADDITIONAL TESTS: Reviewed.

## 2019-05-24 NOTE — PROGRESS NOTE ADULT - PROBLEM SELECTOR PLAN 2
see plan as below BCx prelim with GNR, likely 2/2 UTI.   - repeat blood cultures 5/24.   - c/w CTX 2g q24 (started 5/22- )    #Complicated UTI  - see plan as above  - US retroperitoneal negative for hydronephrosis  - CT abd w/o contrast w/ minimal hydronephrosis w/ perinephric stranding, findings with possible pyelonephritis or recently passed kidney stone.

## 2019-05-24 NOTE — PROGRESS NOTE ADULT - PROBLEM SELECTOR PLAN 8
Has chronic OA of bilateral knees, uses cane for ambulation. States his ambulation has been deteriorating with worsening pain. Takes tylenol/motrin daily for pain control .Xrays inpatient c/w OA.   - PT eval    # Generalized weakness- likely multifactorial with current infection with bacteremia, vs. deconditioning with underlying arthritis, has chronic back pain. muscle strength 5/5 in upper extremities but 4/5 in lower extremities.   - PT eval- ALEYDA     #Depression  - continue Sertraline 100mg qd     #Overactive bladder   - continue home oxybutynin 5mg qd

## 2019-05-24 NOTE — PROGRESS NOTE ADULT - PROBLEM SELECTOR PLAN 5
Creatinine 3.67 on arrival, per PMD, baseline Cr 1.81-1.9, has diabetic nephropathy. FeUrea 49.6 c/w intrinsic renal injury. Hold Enalapril and Lasix at this time. s/p 3L fluids  - Cr downtrending   - monitor I/Os   - trend BMP  - f/u retroperitoneal US to r/o obstruction/hydronephrosis   - avoid nephrotoxic agents    #Anion gap MA  -likely in the setting of MANAN  -monitor renal function  -plan as above.    #Troponinemia   - Trop 0.07 --> 0.08. patient without any symptoms of chest pains/shortness of breath, EKG with no acute ST/T-wave changes. Low suspicion for ACS. Likely secondary to demand ischemia in setting of sepsis/bacteremia   no need to trend cardiac enzymes further.

## 2019-05-24 NOTE — PROGRESS NOTE ADULT - PROBLEM SELECTOR PLAN 6
Pt with h/o CAD, non-obstructive, no h/o stents. No history of CHF. Follows up with Dr. Neil, last echo from 03/2019 with EF 50-55%, LVH, no other WMA. Medications include amlodipine, enalapril, lasix   - Echo from this AM with moderate global hypokinesis, concentric LVH, EF 35% however patient noted to be tachycardic >130s during exam, and patient is septic.  - will need repeat echo outpatient.  - continue lipitor 10mg qhs Pt with h/o CAD, non-obstructive, no h/o stents. No history of CHF. Follows up with Dr. Neil, last echo from 03/2019 with EF 50-55%, LVH, no other WMA. Medications include amlodipine, enalapril, lasix   - Echo from this AM with moderate global hypokinesis, concentric LVH, EF 35% however patient noted to be tachycardic >130s during exam, and patient is septic.  - will need repeat echo outpatient.  - continue lipitor 10mg qhs    #Heart failure with reduced EF- pt w/ no previous history of CHF, however echo with EF 35% here, with moderate global hypokinesis likely 2/2 septic physiology.   - s/p 40mg IVP Lasix today   - will restart PO lasix 40mg BID starting 5/25 Pt with h/o CAD, non-obstructive, no h/o stents. No history of CHF. Follows up with Dr. Neil, last echo from 03/2019 with EF 50-55%, LVH, no other WMA. Medications include amlodipine, enalapril, lasix   - Echo from this AM with moderate global hypokinesis, concentric LVH, EF 35% however patient noted to be tachycardic >130s during exam, and patient is septic.  - will need repeat echo outpatient.  - continue lipitor 10mg qhs    #Heart failure with reduced EF- pt w/ no previous history of CHF, however echo with EF 35% here, with moderate global hypokinesis likely 2/2 sepsis   - s/p 40mg IVP Lasix today   - will restart PO lasix based on volume status   - monitor I/Os

## 2019-05-24 NOTE — PROGRESS NOTE ADULT - ASSESSMENT
78M with h/o IDDM, HTN, OA BIBA to [facility] for generalized weakness, found to have sepsis 2/2 UTI, now with E.coli bacteremia, being treated with Ceftriaxone (started 5/22- ).    Problem/Plan - 1:  ·  Problem: Sepsis.  Plan: Present on arrival, T 100.7, HR 90s,, RR >20, with WBC 15. Secondary urinary tract infection c/b gram negative bacteremia. Adequately fluid resuscitated with 3L NS, s/p CTX/Azithromycin. CXR with no focal consolidation, however does have pulmonary congestion, but not significantly changed from previous.   - send UCx  - f/u repeat BCx and f/u speciation and sensitivities   - c/w CTX 2g q24h for coverage of bacteremia (5/22- )    #Sinus tachycardia - HR 130s today, likely due to fevers. EKG with Sinus tachycardia, LVH, no acute ischemic changes.   - tylenol PRN for fevers  - continue to monitor    #Transaminitis - , AST 50, may be in setting of sepsis  improving.     Problem/Plan - 2:  ·  Problem: Complicated urinary tract infection.  Plan: see plan as below.     Problem/Plan - 3:  ·  Problem: Bacteremia due to Gram-negative bacteria.  Plan: BCx prelim with GNR, likely 2/2 UTI.   - repeat blood cultures 5/24.   - c/w CTX 2g q24 (started 5/22- ).     Problem/Plan - 4:  ·  Problem: HTN (hypertension).  Plan: Pt with h/o HTN, on Amlodipine, Enalapril. Currently -140s. Will hold antihypertensives in setting of sepsis.   - continue to monitor      #Microcytic Anemia - Iron studies w/ mixed TORRYE and ACD  - transfuse if Hb <7  - monitor H/H  - maintain active type and screen.     Problem/Plan - 5:  ·  Problem: Acute kidney injury superimposed on CKD.  Plan: Creatinine 3.67 on arrival, per PMD, baseline Cr 1.81-1.9, has diabetic nephropathy. FeUrea 49.6 c/w intrinsic renal injury. Hold Enalapril and Lasix at this time. s/p 3L fluids  - Cr downtrending   - monitor I/Os   - trend BMP  - f/u retroperitoneal US to r/o obstruction/hydronephrosis   - avoid nephrotoxic agents    #Anion gap MA  -likely in the setting of MANAN  -monitor renal function  -plan as above.    #Troponinemia   - Trop 0.07 --> 0.08. patient without any symptoms of chest pains/shortness of breath, EKG with no acute ST/T-wave changes. Low suspicion for ACS. Likely secondary to demand ischemia in setting of sepsis/bacteremia   no need to trend cardiac enzymes further.     Problem/Plan - 6:  Problem: CAD (coronary artery disease). Plan: Pt with h/o CAD, non-obstructive, no h/o stents. No history of CHF. Follows up with Dr. Neil, last echo from 03/2019 with EF 50-55%, LVH, no other WMA. Medications include amlodipine, enalapril, lasix   - Echo from this AM with moderate global hypokinesis, concentric LVH, EF 35% however patient noted to be tachycardic >130s during exam, and patient is septic.  - will need repeat echo outpatient.  - continue lipitor 10mg qhs.    Problem/Plan - 7:  ·  Problem: IDDM (insulin dependent diabetes mellitus).  Plan: Takes Humulin 70/30, and 10 lantus at home. A1c 7.3, relatively controlled. With diabetic neuropathy  - MISS  - c/w Lantus 10 units qhs- will uptitrate if needed  - carb consistent diet  - continue Gabapentin     Constipation   - continue colace, senna, miralax.     Problem/Plan - 8:  ·  Problem: Osteoarthritis.  Plan: Has chronic OA of bilateral knees, uses cane for ambulation. States his ambulation has been deteriorating with worsening pain. Takes tylenol/motrin daily for pain control .Xrays inpatient c/w OA.   - PT eval    # Generalized weakness- likely multifactorial with current infection with bacteremia, vs. deconditioning with underlying arthritis, has chronic back pain. muscle strength 5/5 in upper extremities but 4/5 in lower extremities.   - PT eval- ALEYDA     #Depression  - continue Sertraline 100mg qd     #Overactive bladder   - continue home oxybutynin 5mg qd.     Problem/Plan - 9:  ·  Problem: Nutrition, metabolism, and development symptoms.  Plan: F- no IVF, s/p 2L NS  E-replete as needed  N- DASH/TLC, carb consistent   C- FULL CODE  DVt ppx: HSQ.

## 2019-05-24 NOTE — PROGRESS NOTE ADULT - PROBLEM SELECTOR PLAN 1
Present on arrival, T 100.7, HR 90s,, RR >20, with WBC 15. Secondary urinary tract infection c/b gram negative bacteremia. Adequately fluid resuscitated with 3L NS, s/p CTX/Azithromycin. CXR with no focal consolidation, however does have pulmonary congestion, but not significantly changed from previous.   - send UCx  - f/u repeat BCx and f/u speciation and sensitivities   - c/w CTX 2g q24h for coverage of bacteremia (5/22- )    #Sinus tachycardia - HR 130s today, likely due to fevers. EKG with Sinus tachycardia, LVH, no acute ischemic changes.   - tylenol PRN for fevers  - continue to monitor    #Transaminitis - , AST 50, may be in setting of sepsis  improving Present on arrival, T 100.7, HR 90s,, RR >20, with WBC 15. Secondary urinary tract infection c/b gram negative bacteremia. Adequately fluid resuscitated with 3L NS, s/p CTX/Azithromycin. CXR with no focal consolidation, however does have pulmonary congestion, but not significantly changed from previous. UCx NGTD however this was on antibiotics   - f/u repeat BCx and f/u speciation and sensitivities   - c/w CTX 2g q24h for coverage of bacteremia (5/22- )    #Transaminitis - , AST 50, may be in setting of sepsis  improving

## 2019-05-24 NOTE — PROGRESS NOTE ADULT - PROBLEM SELECTOR PLAN 4
Pt with h/o HTN, on Amlodipine, Enalapril. Currently -140s. Will hold antihypertensives in setting of sepsis.   - continue to monitor      #Microcytic Anemia - Iron studies w/ mixed TORREY and ACD  - transfuse if Hb <7  - monitor H/H  - maintain active type and screen

## 2019-05-24 NOTE — PROGRESS NOTE ADULT - ASSESSMENT
78M with h/o IDDM, HTN, OA BIBA to [facility] for generalized weakness, found to have sepsis 2/2 UTI, now with E.coli bacteremia, being treated with Ceftriaxone (started 5/22- ).

## 2019-05-24 NOTE — PROGRESS NOTE ADULT - SUBJECTIVE AND OBJECTIVE BOX
Physical Medicine and Rehabilitation Progress Note:    Patient is a 78y old  Male who presents with a chief complaint of Generalized weakness (24 May 2019 08:07)      HPI:  78M with h/o IDDM, HTN, OA BIBA to [facility] for generalized weakness since waking up this morning. He noticed having increased urinary frequency for the last two days and intermittent dysuria today. Denies hx of BPH or discomfort with voiding. Denies nasal congestion, increased sputum production or cough. Patient lives alone since his wife passed away last year, has no children. He used to work at a ware-house, currently retired. He is a former smoker, quit smoking like 15 years ago. Of note, patient was at [facility] ED four days ago after sliding off his cough and falling on his knee. X-ray showed b/l OA of the knees. Pt uses cane at baseline for ambulation. He has taking Tylenol and Motrin daily for pain control.      At [facility], vitals Tm 100.7, P95, 165/78, R18 - R22, Saturation 93%RA -> 95% on 2L NC. Pt received ceftriaxone 1g, azithromycin 500mg, Lasix 20mg iv, 3L NS, Tylenol 650mg. Pt transferred to [facility] for further care.     ROS: Has increased urinary frequency and occasional dysuria. Reports chronic back pain and b/l knee pain. Denies fever, chills, SOB, CP, palpitations, abdominal pain, n/v or diarrhea. Last BM yesterday. (22 May 2019 21:46)                            11.8   13.50 )-----------( 193      ( 24 May 2019 07:56 )             37.3       05-    138  |  99  |  52<H>  ----------------------------<  172<H>  3.4<L>   |  19<L>  |  3.17<H>    Ca    9.4      24 May 2019 07:56  Phos  4.1     05-  Mg     1.8     -    TPro  7.3  /  Alb  2.5<L>  /  TBili  0.3  /  DBili  x   /  AST  27  /  ALT  26  /  AlkPhos  145<H>  05-24    Vital Signs Last 24 Hrs  T(C): 37.1 (24 May 2019 09:26), Max: 38.3 (24 May 2019 06:32)  T(F): 98.8 (24 May 2019 09:26), Max: 101 (24 May 2019 06:32)  HR: 98 (24 May 2019 10:15) (78 - 135)  BP: 109/69 (24 May 2019 10:15) (109/69 - 160/91)  BP(mean): --  RR: 19 (24 May 2019 09:26) (18 - 22)  SpO2: 92% (24 May 2019 10:15) (91% - 97%)    MEDICATIONS  (STANDING):  atorvastatin 10 milliGRAM(s) Oral at bedtime  cefTRIAXone   IVPB 2 Gram(s) IV Intermittent every 24 hours  docusate sodium 100 milliGRAM(s) Oral two times a day  gabapentin 300 milliGRAM(s) Oral daily  heparin  Injectable 5000 Unit(s) SubCutaneous every 8 hours  insulin glargine Injectable (LANTUS) 10 Unit(s) SubCutaneous at bedtime  insulin lispro (HumaLOG) corrective regimen sliding scale   SubCutaneous Before meals and at bedtime  oxybutynin 5 milliGRAM(s) Oral two times a day  polyethylene glycol 3350 17 Gram(s) Oral daily  senna 2 Tablet(s) Oral at bedtime  sertraline 100 milliGRAM(s) Oral daily    MEDICATIONS  (PRN):  acetaminophen   Tablet .. 650 milliGRAM(s) Oral every 6 hours PRN Temp greater or equal to 38C (100.4F)    Currently Undergoing Physical Therapy at bedside.    Functional Status Assessment:    Previous Level of Function:     · Ambulation Skills	independent; needs device; cane	  · Transfer Skills	independent	  · ADL Skills	independent	  · Work/Leisure Activity	independent	  · Additional Comments	Patient lives in elevator apartment building with no steps. Patient has grab bars in the bathroom and uses a  and RW. Patient has no home health aid or outside assistance. Does not use O2 at baseline.	    Cognitive Status Examination:   · Orientation	oriented to person, place, time and situation	  · Level of Consciousness	lethargic/somnolent	  · Follows Commands and Answers Questions	100% of the time	    Range of Motion Exam:   · Range of Motion Examination	r shoulder flexion limited to 90; deficits as listed below; bilateral lower extremity ROM was WFL (within functional limits); Left UE ROM was WFL (within functional limits)	    Manual Muscle Testing:   · Manual Muscle Testing Results	grossly assessed due to  functional mobility, 3/ BUE & BLE	    Bed Mobility: Rolling/Turning:     · Level of [place]	moderate assist (50% patients effort)	  · Physical Assist/Nonphysical Assist	supervision; verbal cues; 1 person assist	  · Assistive Device	bed rails	    Bed Mobility: Scooting/Bridging:     · Level of [place]	moderate assist (50% patients effort); maximum assist (25% patients effort)	  · Physical Assist/Nonphysical Assist	supervision; 1 person assist; 2 person assist	    Bed Mobility: Sit to Supine:     · Level of [place]	moderate assist (50% patients effort)	  · Physical Assist/Nonphysical Assist	2 person assist; supervision; verbal cues	    Bed Mobility: Supine to Sit:     · Level of [place]	maximum assist (25% patients effort)	  · Physical Assist/Nonphysical Assist	supervision; verbal cues; 1 person assist	    Bed Mobility Analysis:     · Bed Mobility Limitations	decreased ability to use arms for pushing/pulling; decreased ability to use legs for bridging/pushing; impaired ability to control trunk for mobility	  · Impairments Contributing to Impaired Bed Mobility	decreased flexibility; decreased strength; impaired balance	    Transfer: Sit to Stand:     · Level of [place]	moderate assist (50% patients effort)	  · Physical Assist/Nonphysical Assist	set-up required; verbal cues; 1 person assist; bed elavated	  · Weight-Bearing Restrictions	full weight-bearing	  · Assistive Device	rolling walker	    Transfer: Stand to Sit:     · Level of [place]	moderate assist (50% patients effort)	  · Physical Assist/Nonphysical Assist	supervision; verbal cues; 1 person assist	  · Weight-Bearing Restrictions	weight-bearing as tolerated	  · Assistive Device	rolling walker	    Sit/Stand Transfer Safety Analysis:     · Transfer Safety Concerns Noted	decreased weight-shifting ability; losing balance; does not achieve full upright stance	  · Impairments Contributing to Impaired Transfers	impaired balance; decreased strength	    Gait Skills:     · Level of [place]	moderate assist (50% patients effort)	  · Physical Assist/Nonphysical Assist	2 person assist; supervision; verbal cues	  · Weight-Bearing Restrictions	weight-bearing as tolerated	  · Assistive Device	rolling walker	  · Gait Distance	5 sidesteps x 2	    Gait Analysis:     · Gait Pattern Used	3-point gait	  · Gait Deviations Noted	decreased bee; increased time in double stance; decreased velocity of limb motion; decreased step length; decreased stride length; decreased weight-shifting ability	  · Impairments Contributing to Gait Deviations	impaired balance; decreased strength	    Balance Skills Assessment:     · Sitting Balance: Static	good minus	  · Sitting Balance: Dynamic	good minus	  · Sit-to-Stand Balance	poor plus	  · Standing Balance: Static	poor plus	  · Standing Balance: Dynamic	poor plus	  · Systems Impairment Contributing to Balance Disturbance	musculoskeletal	  · Identified Impairments Contributing to Balance Disturbance	decreased strength	    Sensory Examination:   Sensory Examination:    Grossly Intact:   · Gross Sensory Examination	Grossly Intact; Left UE; Right UE; Left LE; Right LE	      Clinical Impressions:   · Criteria for Skilled Therapeutic Interventions	impairments found; functional limitations in following categories; rehab potential; therapy frequency; anticipated discharge recommendation	  · Impairments Found (describe specific impairments)	gait, locomotion, and balance; gross motor; muscle strength; ventilation and respiration/gas exchange; aerobic capacity/endurance	  · Functional Limitations in Following Categories (describe specific limitations)	self-care; home management	  · Rehab Potential	fair, will monitor progress closely	          PM&R Impression: as above    Disposition Plan Recommendations: subacute rehab placement

## 2019-05-24 NOTE — PROGRESS NOTE ADULT - PROBLEM SELECTOR PLAN 3
BCx prelim with GNR, likely 2/2 UTI.   - repeat blood cultures 5/24.   - c/w CTX 2g q24 (started 5/22- ) Patient with tachycardia, EKG with possible A-flutter w/ variable block, HR still 100-130s. s/p IV Lopressor 5mg and Lasix 40mg IVP x1   - control fevers with tylenol   - started lopressor 12.5 mg BID   - cards consulted - f/u recs   - will likely need anticoagulation considering CHADsVASC ~4 Patient with tachycardia, EKG with possible A-flutter w/ variable block, HR still 100-130s. s/p IV Lopressor 5mg and Lasix 40mg IVP x1   - control fevers with tylenol   - started lopressor 12.5 mg BID   - cards consulted - f/u recs   - will likely need anticoagulation considering CHADsVASC ~4. started Eliquis 2.5mg BID

## 2019-05-24 NOTE — PROGRESS NOTE ADULT - PROBLEM SELECTOR PLAN 10
1) PCP Contacted on Admission: (Y/N) --> Name & Phone #: Dr. Lundberg [phone]  Dr. Neil [phone]  2) Date of Contact with PCP:  3) PCP Contacted at Discharge: (Y/N, N/A)  4) Summary of Handoff Given to PCP:   5) Post-Discharge Appointment Date and Location:

## 2019-05-25 DIAGNOSIS — I50.21 ACUTE SYSTOLIC (CONGESTIVE) HEART FAILURE: ICD-10-CM

## 2019-05-25 LAB
-  AMIKACIN: SIGNIFICANT CHANGE UP
-  AMOXICILLIN/CLAVULANIC ACID: SIGNIFICANT CHANGE UP
-  AMPICILLIN/SULBACTAM: SIGNIFICANT CHANGE UP
-  AMPICILLIN: SIGNIFICANT CHANGE UP
-  AZTREONAM: SIGNIFICANT CHANGE UP
-  CEFAZOLIN: SIGNIFICANT CHANGE UP
-  CEFEPIME: SIGNIFICANT CHANGE UP
-  CEFOTAXIME: SIGNIFICANT CHANGE UP
-  CEFOXITIN: SIGNIFICANT CHANGE UP
-  CEFTAZIDIME: SIGNIFICANT CHANGE UP
-  CEFTRIAXONE: SIGNIFICANT CHANGE UP
-  CEFUROXIME: SIGNIFICANT CHANGE UP
-  CIPROFLOXACIN: SIGNIFICANT CHANGE UP
-  ERTAPENEM: SIGNIFICANT CHANGE UP
-  GENTAMICIN: SIGNIFICANT CHANGE UP
-  IMIPENEM: SIGNIFICANT CHANGE UP
-  LEVOFLOXACIN: SIGNIFICANT CHANGE UP
-  MEROPENEM: SIGNIFICANT CHANGE UP
-  MOXIFLOXACIN(AEROBIC): SIGNIFICANT CHANGE UP
-  PIPERACILLIN/TAZOBACTAM: SIGNIFICANT CHANGE UP
-  TETRACYCLINE: SIGNIFICANT CHANGE UP
-  TIGECYCLINE: SIGNIFICANT CHANGE UP
-  TOBRAMYCIN: SIGNIFICANT CHANGE UP
-  TRIMETHOPRIM/SULFAMETHOXAZOLE: SIGNIFICANT CHANGE UP
ANION GAP SERPL CALC-SCNC: 19 MMOL/L — HIGH (ref 5–17)
APTT BLD: 30.1 SEC — SIGNIFICANT CHANGE UP (ref 27.5–36.3)
BUN SERPL-MCNC: 64 MG/DL — HIGH (ref 7–23)
CALCIUM SERPL-MCNC: 9.3 MG/DL — SIGNIFICANT CHANGE UP (ref 8.4–10.5)
CHLORIDE SERPL-SCNC: 99 MMOL/L — SIGNIFICANT CHANGE UP (ref 96–108)
CO2 SERPL-SCNC: 21 MMOL/L — LOW (ref 22–31)
CREAT SERPL-MCNC: 3.2 MG/DL — HIGH (ref 0.5–1.3)
CULTURE RESULTS: SIGNIFICANT CHANGE UP
CULTURE RESULTS: SIGNIFICANT CHANGE UP
GLUCOSE BLDC GLUCOMTR-MCNC: 128 MG/DL — HIGH (ref 70–99)
GLUCOSE BLDC GLUCOMTR-MCNC: 223 MG/DL — HIGH (ref 70–99)
GLUCOSE SERPL-MCNC: 170 MG/DL — HIGH (ref 70–99)
HCT VFR BLD CALC: 40.2 % — SIGNIFICANT CHANGE UP (ref 39–50)
HGB BLD-MCNC: 12.8 G/DL — LOW (ref 13–17)
INR BLD: 1.38 — HIGH (ref 0.88–1.16)
MAGNESIUM SERPL-MCNC: 1.9 MG/DL — SIGNIFICANT CHANGE UP (ref 1.6–2.6)
MCHC RBC-ENTMCNC: 25.8 PG — LOW (ref 27–34)
MCHC RBC-ENTMCNC: 31.8 GM/DL — LOW (ref 32–36)
MCV RBC AUTO: 81 FL — SIGNIFICANT CHANGE UP (ref 80–100)
METHOD TYPE: SIGNIFICANT CHANGE UP
NRBC # BLD: 0 /100 WBCS — SIGNIFICANT CHANGE UP (ref 0–0)
ORGANISM # SPEC MICROSCOPIC CNT: SIGNIFICANT CHANGE UP
PHOSPHATE SERPL-MCNC: 5.5 MG/DL — HIGH (ref 2.5–4.5)
PLATELET # BLD AUTO: 238 K/UL — SIGNIFICANT CHANGE UP (ref 150–400)
POTASSIUM SERPL-MCNC: 3.6 MMOL/L — SIGNIFICANT CHANGE UP (ref 3.5–5.3)
POTASSIUM SERPL-SCNC: 3.6 MMOL/L — SIGNIFICANT CHANGE UP (ref 3.5–5.3)
PROTHROM AB SERPL-ACNC: 15.7 SEC — HIGH (ref 10–12.9)
RBC # BLD: 4.96 M/UL — SIGNIFICANT CHANGE UP (ref 4.2–5.8)
RBC # FLD: 15.3 % — HIGH (ref 10.3–14.5)
SODIUM SERPL-SCNC: 139 MMOL/L — SIGNIFICANT CHANGE UP (ref 135–145)
SPECIMEN SOURCE: SIGNIFICANT CHANGE UP
SPECIMEN SOURCE: SIGNIFICANT CHANGE UP
WBC # BLD: 13.37 K/UL — HIGH (ref 3.8–10.5)
WBC # FLD AUTO: 13.37 K/UL — HIGH (ref 3.8–10.5)

## 2019-05-25 PROCEDURE — 93010 ELECTROCARDIOGRAM REPORT: CPT | Mod: 77

## 2019-05-25 PROCEDURE — 93010 ELECTROCARDIOGRAM REPORT: CPT | Mod: 76

## 2019-05-25 PROCEDURE — 71045 X-RAY EXAM CHEST 1 VIEW: CPT | Mod: 26

## 2019-05-25 RX ORDER — ACETAMINOPHEN 500 MG
650 TABLET ORAL ONCE
Refills: 0 | Status: COMPLETED | OUTPATIENT
Start: 2019-05-25 | End: 2019-05-25

## 2019-05-25 RX ORDER — PHENAZOPYRIDINE HCL 100 MG
100 TABLET ORAL ONCE
Refills: 0 | Status: COMPLETED | OUTPATIENT
Start: 2019-05-25 | End: 2019-05-25

## 2019-05-25 RX ORDER — METOPROLOL TARTRATE 50 MG
25 TABLET ORAL
Refills: 0 | Status: DISCONTINUED | OUTPATIENT
Start: 2019-05-25 | End: 2019-06-04

## 2019-05-25 RX ORDER — ACETAMINOPHEN 500 MG
650 TABLET ORAL EVERY 6 HOURS
Refills: 0 | Status: DISCONTINUED | OUTPATIENT
Start: 2019-05-25 | End: 2019-05-31

## 2019-05-25 RX ORDER — METOPROLOL TARTRATE 50 MG
5 TABLET ORAL ONCE
Refills: 0 | Status: COMPLETED | OUTPATIENT
Start: 2019-05-25 | End: 2019-05-25

## 2019-05-25 RX ADMIN — Medication 650 MILLIGRAM(S): at 07:20

## 2019-05-25 RX ADMIN — ATORVASTATIN CALCIUM 10 MILLIGRAM(S): 80 TABLET, FILM COATED ORAL at 21:49

## 2019-05-25 RX ADMIN — Medication 12.5 MILLIGRAM(S): at 06:27

## 2019-05-25 RX ADMIN — Medication 4: at 21:49

## 2019-05-25 RX ADMIN — Medication 5 MILLIGRAM(S): at 06:28

## 2019-05-25 RX ADMIN — Medication 5 MILLIGRAM(S): at 17:26

## 2019-05-25 RX ADMIN — Medication 5 MILLIGRAM(S): at 17:27

## 2019-05-25 RX ADMIN — Medication 25 MILLIGRAM(S): at 16:34

## 2019-05-25 RX ADMIN — Medication 2: at 08:41

## 2019-05-25 RX ADMIN — Medication 650 MILLIGRAM(S): at 21:19

## 2019-05-25 RX ADMIN — Medication 4: at 12:41

## 2019-05-25 RX ADMIN — CEFTRIAXONE 100 GRAM(S): 500 INJECTION, POWDER, FOR SOLUTION INTRAMUSCULAR; INTRAVENOUS at 06:27

## 2019-05-25 RX ADMIN — APIXABAN 2.5 MILLIGRAM(S): 2.5 TABLET, FILM COATED ORAL at 17:26

## 2019-05-25 RX ADMIN — SERTRALINE 100 MILLIGRAM(S): 25 TABLET, FILM COATED ORAL at 11:45

## 2019-05-25 RX ADMIN — SENNA PLUS 2 TABLET(S): 8.6 TABLET ORAL at 21:19

## 2019-05-25 RX ADMIN — GABAPENTIN 300 MILLIGRAM(S): 400 CAPSULE ORAL at 11:45

## 2019-05-25 RX ADMIN — Medication 650 MILLIGRAM(S): at 22:19

## 2019-05-25 RX ADMIN — Medication 100 MILLIGRAM(S): at 18:44

## 2019-05-25 RX ADMIN — INSULIN GLARGINE 10 UNIT(S): 100 INJECTION, SOLUTION SUBCUTANEOUS at 21:25

## 2019-05-25 RX ADMIN — APIXABAN 2.5 MILLIGRAM(S): 2.5 TABLET, FILM COATED ORAL at 06:27

## 2019-05-25 RX ADMIN — POLYETHYLENE GLYCOL 3350 17 GRAM(S): 17 POWDER, FOR SOLUTION ORAL at 11:45

## 2019-05-25 RX ADMIN — Medication 100 MILLIGRAM(S): at 06:27

## 2019-05-25 RX ADMIN — Medication 325 MILLIGRAM(S): at 06:28

## 2019-05-25 NOTE — PROGRESS NOTE ADULT - PROBLEM SELECTOR PLAN 7
Takes Humulin 70/30, and 10 lantus at home. A1c 7.3, relatively controlled. With diabetic neuropathy  - MISS  - c/w Lantus 10 units qhs- will uptitrate if needed  - carb consistent diet  - continue Gabapentin     #Constipation   - continue colace, senna, miralax    #Hypoalbuminemia  - Nutrition consult

## 2019-05-25 NOTE — PROGRESS NOTE ADULT - PROBLEM SELECTOR PLAN 3
Patient with tachycardia, EKG with possible A-flutter w/ variable block, HR still 100-130s. s/p IV Lopressor 5mg and Lasix 40mg IVP x1   - control fevers with Tylenol   - started lopressor 12.5 mg BID   - cards consulted - will transfer to Cardiology service for further management of A flutter  - will likely need anticoagulation considering CHADsVASC ~4. started Eliquis 2.5mg BID

## 2019-05-25 NOTE — PROGRESS NOTE ADULT - PROBLEM SELECTOR PLAN 7
A1c 7.3  -continue Lantus 10 units qhs and ISS  -continue home Gabapentin      VTE ppx: on Eliquis  Dispo: possible EP consult on tuesday for aflutter Trop 0.07 --> 0.08, asymptomatic, EKG with no acute ST/T-wave changes. Likely secondary to demand ischemia in setting of sepsis/bacteremia  -hx of non obstructive, follows up with Dr. Neil  -Echo: moderate global hypokinesis, concentric LVH, EF 35%   -holding home enalapril, continue metoprolol 25mg BID and pravastatin 40mg

## 2019-05-25 NOTE — PROGRESS NOTE ADULT - PROBLEM SELECTOR PLAN 2
BCx prelim with NGR, likely 2/2 UTI.   -repeat blood cultures 5/24 NGTD  -continue CTX 2g q24 as above    #Complicated UTI  - see plan as above  - US retroperitoneal negative for hydronephrosis  - CT abd w/o contrast w/ minimal hydronephrosis w/ perinephric stranding, findings with possible pyelonephritis or recently passed kidney stone. BCx prelim with NGR, likely 2/2 UTI. Pt endorses burning with urination s/p Pyridium 100mg x 1 dose.  -repeat blood cultures 5/24 NGTD  -continue CTX 2g q24 as above    #Complicated UTI  - see plan as above  - US retroperitoneal negative for hydronephrosis  - CT abd w/o contrast w/ minimal hydronephrosis w/ perinephric stranding, findings with possible pyelonephritis or recently passed kidney stone.

## 2019-05-25 NOTE — PROGRESS NOTE ADULT - ASSESSMENT
78M with h/o IDDM, HTN and OA who was BIBA to [facility] for generalized weakness, found to have sepsis 2/2 E. coli bacteremia 2/2 UTI with new onset atrial flutter, now transferred to [facility] for further management.

## 2019-05-25 NOTE — PROGRESS NOTE ADULT - PROBLEM SELECTOR PLAN 6
Trop 0.07 --> 0.08, asymptomatic, EKG with no acute ST/T-wave changes. Likely secondary to demand ischemia in setting of sepsis/bacteremia  -hx of non obstructive, follows up with Dr. Neil  -Echo: moderate global hypokinesis, concentric LVH, EF 35%   -holding home enalapril, continue metoprolol 25mg BID and pravastatin 40mg BP stable  -holding home amlodipine 10mg and enalapril 10mg in setting of sepsis  -continue metoprolol 25mg BID

## 2019-05-25 NOTE — PROGRESS NOTE ADULT - SUBJECTIVE AND OBJECTIVE BOX
Cardiology PA Acceptance Note    CC: weakness    Hospital Course: Patient is a 77yo M with a PMHx of DM, HTN, and OA who was BIBA to [facility] for generalized weakness, increased urinary frequency and intermittent dysuria. Patient found to have sepsis secondary to E. coli bacteremia 2/2 UTI on admission and subsequently transferred to [facility], which is now resolving. Hospital course c/b the development of new onset atrial flutter and globally depressed EF, likely due to the concomitant sepsis. Patient started on Ceftriaxone 2g Q24H with resolution of sepsis and clearance of bacteremia. Started on anticoagulation and beta blockade for atrial flutter. Cardiology consulted for management of atrial flutter given resolving sepsis and decision made to transfer patient to cardiology service for telemetry monitoring and further management of atrial flutter.  Pt seen and examined at bedside and c/o ___________. He denies CP, palpitations, dizziness, lightheadedness or SOB.  ROS negative except for HPI and subjective.  	  MEDICATIONS:  metoprolol tartrate 25 milliGRAM(s) Oral two times a day  cefTRIAXone   IVPB 2 Gram(s) IV Intermittent every 24 hours  acetaminophen   Tablet .. 650 milliGRAM(s) Oral every 6 hours PRN  gabapentin 300 milliGRAM(s) Oral daily  sertraline 100 milliGRAM(s) Oral daily  docusate sodium 100 milliGRAM(s) Oral two times a day  polyethylene glycol 3350 17 Gram(s) Oral daily  senna 2 Tablet(s) Oral at bedtime  atorvastatin 10 milliGRAM(s) Oral at bedtime  insulin glargine Injectable (LANTUS) 10 Unit(s) SubCutaneous at bedtime  insulin lispro (HumaLOG) corrective regimen sliding scale   SubCutaneous Before meals and at bedtime  apixaban 2.5 milliGRAM(s) Oral every 12 hours  oxybutynin 5 milliGRAM(s) Oral two times a day      PHYSICAL EXAM:  T(C): 36.7 (05-25-19 @ 12:47), Max: 37.6 (05-25-19 @ 00:57)  HR: 142 (05-25-19 @ 16:45) (68 - 142)  BP: 140/85 (05-25-19 @ 16:45) (140/85 - 161/84)  RR: 20 (05-25-19 @ 16:45) (20 - 24)  SpO2: 95% (05-25-19 @ 16:45) (94% - 96%)    I&O's Summary    24 May 2019 07:01  -  25 May 2019 07:00  --------------------------------------------------------  IN: 50 mL / OUT: 1400 mL / NET: -1350 mL    25 May 2019 07:01  -  25 May 2019 17:28  --------------------------------------------------------  IN: 350 mL / OUT: 400 mL / NET: -50 mL                                        Appearance: Normal	  HEENT:   Normal oral mucosa, PERRL, EOMI	  Neck: Supple, + JVD/ - JVD; Carotid Bruit   Cardiovascular: Normal S1 S2, No JVD, No murmurs,   Respiratory: Lungs clear to auscultation/Decreased Breath Sounds/No Rales, Rhonchi, Wheezing	  Gastrointestinal:  Soft, Non-tender, + BS	  Skin: No rashes, No ecchymoses, No cyanosis  Extremities: Normal range of motion, No clubbing, cyanosis or edema  Vascular: Peripheral pulses palpable 2+ bilaterally  Neurologic: Non-focal  Psychiatry: A & O x 3, Mood & affect appropriate  	    LABS:	 	                   12.8   13.37 )-----------( 238      ( 25 May 2019 08:58 )             40.2     139  |  99  |  64<H>  ----------------------------<  170<H>  3.6   |  21<L>  |  3.20<H>    Ca    9.3      25 May 2019 08:58  Phos  5.5     05-25  Mg     1.9     05-25  TPro  7.3  /  Alb  2.5<L>  /  TBili  0.3  /  DBili  x   /  AST  27  /  ALT  26  /  AlkPhos  145<H>  05-24   PT/INR - ( 25 May 2019 08:58 )   PT: 15.7 sec;   INR: 1.38     PTT - ( 25 May 2019 08:58 )  PTT:30.1 sec Cardiology PA Acceptance Note    CC: weakness    Hospital Course: Patient is a 77yo M with a PMHx of DM, HTN, and OA who was BIBA to [facility] for generalized weakness, increased urinary frequency and intermittent dysuria. Patient found to have sepsis secondary to E. coli bacteremia 2/2 UTI on admission and subsequently transferred to [facility], which is now resolving. Hospital course c/b the development of new onset atrial flutter and globally depressed EF, likely due to the concomitant sepsis. Patient started on Ceftriaxone 2g Q24H with resolution of sepsis and clearance of bacteremia. Started on anticoagulation and beta blockade for atrial flutter. Cardiology consulted for management of atrial flutter given resolving sepsis and decision made to transfer patient to cardiology service for telemetry monitoring and further management of atrial flutter.  Pt seen and examined at bedside and c/o burning with urination. He denies CP, palpitations, dizziness, lightheadedness or SOB.  ROS negative except for HPI and subjective.  	  MEDICATIONS:  metoprolol tartrate 25 milliGRAM(s) Oral two times a day  cefTRIAXone   IVPB 2 Gram(s) IV Intermittent every 24 hours  acetaminophen   Tablet .. 650 milliGRAM(s) Oral every 6 hours PRN  gabapentin 300 milliGRAM(s) Oral daily  sertraline 100 milliGRAM(s) Oral daily  docusate sodium 100 milliGRAM(s) Oral two times a day  polyethylene glycol 3350 17 Gram(s) Oral daily  senna 2 Tablet(s) Oral at bedtime  atorvastatin 10 milliGRAM(s) Oral at bedtime  insulin glargine Injectable (LANTUS) 10 Unit(s) SubCutaneous at bedtime  insulin lispro (HumaLOG) corrective regimen sliding scale   SubCutaneous Before meals and at bedtime  apixaban 2.5 milliGRAM(s) Oral every 12 hours  oxybutynin 5 milliGRAM(s) Oral two times a day      PHYSICAL EXAM:  T(C): 36.7 (05-25-19 @ 12:47), Max: 37.6 (05-25-19 @ 00:57)  HR: 142 (05-25-19 @ 16:45) (68 - 142)  BP: 140/85 (05-25-19 @ 16:45) (140/85 - 161/84)  RR: 20 (05-25-19 @ 16:45) (20 - 24)  SpO2: 95% (05-25-19 @ 16:45) (94% - 96%)    I&O's Summary    24 May 2019 07:01  -  25 May 2019 07:00  --------------------------------------------------------  IN: 50 mL / OUT: 1400 mL / NET: -1350 mL    25 May 2019 07:01  -  25 May 2019 17:28  --------------------------------------------------------  IN: 350 mL / OUT: 400 mL / NET: -50 mL                                        Appearance: Normal	  HEENT:   Normal oral mucosa, PERRL, EOMI	  Neck: Supple,  - JVD;  no Carotid Bruit   Cardiovascular: Normal S1 S2, No JVD, No murmurs,   Respiratory: Lungs clear to auscultation, No Rales, Rhonchi, Wheezing	  Gastrointestinal:  Soft, Non-tender, + BS	  Skin: No rashes, No ecchymoses, No cyanosis  Extremities: Normal range of motion, No clubbing, cyanosis or edema  Vascular: Peripheral pulses palpable 2+ bilaterally  Neurologic: Non-focal  Psychiatry: A & O x 3, Mood & affect appropriate  	    LABS:	 	                   12.8   13.37 )-----------( 238      ( 25 May 2019 08:58 )             40.2     139  |  99  |  64<H>  ----------------------------<  170<H>  3.6   |  21<L>  |  3.20<H>    Ca    9.3      25 May 2019 08:58  Phos  5.5     05-25  Mg     1.9     05-25  TPro  7.3  /  Alb  2.5<L>  /  TBili  0.3  /  DBili  x   /  AST  27  /  ALT  26  /  AlkPhos  145<H>  05-24   PT/INR - ( 25 May 2019 08:58 )   PT: 15.7 sec;   INR: 1.38     PTT - ( 25 May 2019 08:58 )  PTT:30.1 sec

## 2019-05-25 NOTE — PROGRESS NOTE ADULT - PROBLEM SELECTOR PLAN 3
-130s, in [facility] pt received IV Lopressor 5mg and Lasix 40mg IVP x1 for fluid overload likely in setting of aflutter and [facility]  -increased lopressor to 25mg BID   -continue Eliquis 2.5mg BID

## 2019-05-25 NOTE — PROGRESS NOTE ADULT - PROBLEM SELECTOR PLAN 4
Pt with h/o HTN, on Amlodipine, Enalapril. Currently -140s. Will hold antihypertensives in setting of sepsis.   - continue to monitor  - Metoprolol as above      #Microcytic Anemia - Iron studies w/ mixed TORREY and ACD  - transfuse if Hb <7  - monitor H/H  - maintain active type and screen

## 2019-05-25 NOTE — PROGRESS NOTE ADULT - PROBLEM SELECTOR PLAN 1
on admission T 100.7, HR 90s, RR >20, with WBC 15; now resolved. 2/2 urinary tract infection c/b gram negative bacteremia. s/p 3L NS and CTX/Azithromycin. CXR without focal consolidation. UCx NGTD however this was on antibiotics. Initial blood cultures growing E. coli susceptible to Ceftriaxone.  -continue CTX 2g q24h for coverage of bacteremia, will require two week course of appropriate antibiotic therapy from last set of negative cultures (through 6/7)  -Surveillance cultures with NGTD

## 2019-05-25 NOTE — PROGRESS NOTE ADULT - PROBLEM SELECTOR PLAN 10
1) PCP Contacted on Admission: (Y/N) --> Name & Phone #: Dr. Lundberg [phone]  Dr. Neil [phone]  2) Date of Contact with PCP:  3) PCP Contacted at Discharge: (Y/N, N/A)  4) Summary of Handoff Given to PCP:   5) Post-Discharge Appointment Date and Location:

## 2019-05-25 NOTE — PROGRESS NOTE ADULT - PROBLEM SELECTOR PLAN 4
Cr 3.67 on arrival s/p 3L IVF at [facility], per PMD baseline Cr 1.81-1.9, has diabetic nephropathy  -FeUrea 49.6 c/w intrinsic renal injury   -Hold home Enalapril and Lasix  -renal consulted, f/u recs  -retroperitoneal US: B/L renal cysts measuring up to 5.5 cm on the right and 5.1cm on the left, negative for hydronephrosis

## 2019-05-25 NOTE — PROGRESS NOTE ADULT - PROBLEM SELECTOR PLAN 8
A1c 7.3  -continue Lantus 10 units qhs and ISS  -continue home Gabapentin      VTE ppx: on Eliquis  Dispo: possible EP consult on tuesday for aflutter

## 2019-05-25 NOTE — PROGRESS NOTE ADULT - SUBJECTIVE AND OBJECTIVE BOX
Called to see Pt for Rapid A Flutter with variable Block seen today and on previous   EKG s  yesterday   Pt is asymptomatic with no chest pain or dyspnea         Pt admitted for generalized weakness    PAST MEDICAL & SURGICAL HISTORY:  OA (osteoarthritis)  HTN (hypertension)  IDDM (insulin dependent diabetes mellitus)  S/P cholecystectomy  S/P appendectomy  H/O knee surgery    Home Medications:  enalapril 10 mg oral tablet: 1 tab(s) orally once a day (22 May 2019 22:07)  gabapentin 300 mg oral capsule: 1 cap(s) orally once a day (22 May 2019 22:09)  HumuLIN 70/30 subcutaneous suspension: 10 unit(s) subcutaneous once a day (22 May 2019 22:09)  Lasix 80 mg oral tablet: 1 tab(s) orally once a day (22 May 2019 22:07)  metFORMIN 500 mg oral tablet, extended release: 1 tab(s) orally once a day (22 May 2019 22:10)  Norvasc 10 mg oral tablet: 1 tab(s) orally once a day (22 May 2019 22:06)  pravastatin 40 mg oral tablet: 1 tab(s) orally once a day (22 May 2019 22:10)  repaglinide 2 mg oral tablet:  (22 May 2019 22:11)  sertraline 100 mg oral tablet: 1 tab(s) orally once a day (22 May 2019 22:12)  VESIcare 5 mg oral tablet: 1 tab(s) orally once a day (22 May 2019 22:11)    MEDICATIONS  (STANDING):  apixaban 2.5 milliGRAM(s) Oral every 12 hours  atorvastatin 10 milliGRAM(s) Oral at bedtime  cefTRIAXone   IVPB 2 Gram(s) IV Intermittent every 24 hours  docusate sodium 100 milliGRAM(s) Oral two times a day  gabapentin 300 milliGRAM(s) Oral daily  insulin glargine Injectable (LANTUS) 10 Unit(s) SubCutaneous at bedtime  insulin lispro (HumaLOG) corrective regimen sliding scale   SubCutaneous Before meals and at bedtime  metoprolol tartrate 12.5 milliGRAM(s) Oral two times a day  oxybutynin 5 milliGRAM(s) Oral two times a day  polyethylene glycol 3350 17 Gram(s) Oral daily  senna 2 Tablet(s) Oral at bedtime  sertraline 100 milliGRAM(s) Oral daily    MEDICATIONS  (PRN):  acetaminophen   Tablet .. 650 milliGRAM(s) Oral every 6 hours PRN Temp greater or equal to 38C (100.4F), Moderate Pain (4 - 6)    ICU Vital Signs Last 24 Hrs  T(C): 37.2 (25 May 2019 05:52), Max: 37.6 (25 May 2019 00:57)  T(F): 98.9 (25 May 2019 05:52), Max: 99.6 (25 May 2019 00:57)  HR: 71 (25 May 2019 05:52) (71 - 100)  BP: 149/80 (25 May 2019 05:52) (148/86 - 161/84)  BP(mean): --  ABP: --  ABP(mean): --  RR: 21 (25 May 2019 05:52) (21 - 24)  SpO2: 94% (25 May 2019 05:52) (94% - 95%)      Heat Rate  113     Lungs decreased breath sounds at bases  CXR  CHF  pulmonary venous congestion   CV s1s2 irreg  ECHO  EF 35 %   moderate global hypokinesis   abd soft  ext stable                          12.8   13.37 )-----------( 238      ( 25 May 2019 08:58 )             40.2   05-25    139  |  99  |  64<H>  ----------------------------<  170<H>  3.6   |  21<L>  |  3.20<H>    Ca    9.3      25 May 2019 08:58  Phos  5.5     05-25  Mg     1.9     05-25    TPro  7.3  /  Alb  2.5<L>  /  TBili  0.3  /  DBili  x   /  AST  27  /  ALT  26  /  AlkPhos  145<H>  05-24  Culture - Blood (05.24.19 @ 16:18)    Specimen Source: .Blood Blood    Culture Results:   No growth at 12 hours    Culture - Urine (05.23.19 @ 17:52)    Specimen Source: .Urine None    Culture Results:   No growth    Culture - Blood (05.23.19 @ 01:47)    Gram Stain:   Growth in aerobic bottle: Gram Negative Rods  Growth in anaerobic bottle: Gram Negative Rods    -  Escherichia coli: Detec    -  Amikacin: S <=8    -  Amoxicillin/Clavulanic Acid: S <=8/4    -  Ampicillin: R >16 These ampicillin results predict results for amoxicillin    -  Ampicillin/Sulbactam: I 16/8    -  Aztreonam: S <=4    -  Cefazolin: S <=2    -  Cefepime: S <=2    -  Cefotaxime: S <=2    -  Cefoxitin: S <=4    -  Ceftazidime: S <=1    -  Ceftriaxone: S <=1 Enterobacter, Citrobacter, and Serratia may develop resistance during prolonged therapy    -  Cefuroxime: S <=4    -  Ciprofloxacin: S <=0.5    -  Ertapenem: S <=0.5    -  Gentamicin: S 2    -  Imipenem: S <=1    -  Levofloxacin: S <=1    -  Meropenem: S <=1    -  Moxifloxacin(Aerobic): S <=2    -  Piperacillin/Tazobactam: S <=8    -  Tetra/Doxy: R >8    -  Tigecycline: S <=1    -  Tobramycin: S <=2    -  Trimethoprim/Sulfamethoxazole: R >2/38    Specimen Source: .Blood Blood    Organism: Blood Culture PCR    Organism: Escherichia coli    Culture Results:   Growth in aerobic and anaerobic bottles: Escherichia coli  "Due to technical problems, Proteus sp. will Not be reported as part of  the BCID panel until further notice"  ***Blood Panel PCR results on this specimen are available  approximately 3 hours after the Gram stain result.***  Gram stain, PCR, and/or culture results may not always  correspond due to difference in methodologies.  ************************************************************  This PCR assay was performed using [facility].  The following targets are tested for: Enterococcus,  vancomycin resistant enterococci, Listeria monocytogenes,  coagulase negative staphylococci, S. aureus,  methicillin resistant S. aureus, Streptococcus agalactiae  (Group B), S. pneumoniae, S.pyogenes (Group A),  Acinetobacter baumannii, Enterobacter cloacae, E. coli,  Klebsiella oxytoca, K. pneumoniae, Proteus sp.,  Serratia marcescens, Haemophilus influenzae,  Neisseria meningitidis, Pseudomonas aeruginosa, Candida  albicans, C. glabrata, C krusei, C parapsilosis,  C. tropicalis and the KPC resistance gene.    Organism Identification: Blood Culture PCR  Escherichia coli    Method Type: PCR    Method Type: TIMOTHY      normal RA  RVF

## 2019-05-25 NOTE — PROGRESS NOTE ADULT - PROBLEM SELECTOR PLAN 2
BCx prelim with GNR, likely 2/2 UTI.   - repeat blood cultures 5/24 NGTD  - c/w CTX 2g q24 as above    #Complicated UTI  - see plan as above  - US retroperitoneal negative for hydronephrosis  - CT abd w/o contrast w/ minimal hydronephrosis w/ perinephric stranding, findings with possible pyelonephritis or recently passed kidney stone.

## 2019-05-25 NOTE — CONSULT NOTE ADULT - ASSESSMENT
78M with h/o IDDM, HTN, OA BIBA to [facility] for generalized weakness, found to have sepsis 2/2 UTI, now with E.coli bacteremia, being treated with Ceftriaxone (started 5/22 ).    # MANAN on CKD VS CKD IV based on stable Cr values since admission  - Cr stable at 3.2- 3.3  - Suggest d/c home Metformin  - suggest intermittent bladder scan  - recheck urinalysis and prot/creatinine ratio after resolution of UTI  - baseline Cr unclear at this time, patient has a h/o diabetic nephropathy as per chart review      # Sepsis secondary to UTI  - on arrival, T 100.7, HR 90s,, RR >20, with WBC 15.  - Secondary urinary tract infection c/b gram negative bacteremia.   -  s/p CTX/Azithromycin.  - URINE Cx NGTD however this was on antibiotics   - c/w CTX 2g q24h for coverage of bacteremia (5/22- )  - Urinalysis positive for bacteria and LE  - suggest holding oxybutinin for now   - US retroperitoneal negative for hydronephrosis  - CT abd w/o contrast w/ minimal hydronephrosis w/ perinephric stranding, findings with possible pyelonephritis or recently passed kidney stone.     # Atrial flutter.    - EKG with possible A-flutter w/ variable block,   - started lopressor 12.5 mg BID   - on Eliquis 2.5mg BID  - resume Lasix as needed	    # HTN (hypertension).   - avoid ace/arb      #Microcytic Anemia - Iron studies w/ mixed TORREY and ACD  - hb 11.8  - transfuse as per primary team if hb < 7  - % sat 5, ferritin 223      # IDDM (insulin dependent diabetes mellitus).  - suggest discontinuing home metformin due to CKD stage       # Osteoarthritis.   - chronic OA of bilateral knees, uses cane for ambulation.   - States his ambulation has been deteriorating with worsening pain. Takes tylenol/motrin daily for pain control .Xrays inpatient c/w OA.   - PT eval  - avoid NSAIDs       #Overactive bladder   - suggest holding oxybutynin 5mg qd for now  - increases myra of UTI from urinary stasis  - suggest bladder scan

## 2019-05-25 NOTE — PROGRESS NOTE ADULT - ASSESSMENT
78M with h/o IDDM, HTN, OA BIBA to [facility] for generalized weakness, found to have sepsis 2/2 E. coli bacteremia 2/2 UTI with new onset atrial flutter

## 2019-05-25 NOTE — CONSULT NOTE ADULT - SUBJECTIVE AND OBJECTIVE BOX
Patient is a 78y old  Male who presents with a chief complaint of Generalized weakness (25 May 2019 12:27)    78M with h/o IDDM, HTN, OA BIBA to [facility] for generalized weakness . He noticed having increased urinary frequency for the last two days and intermittent dysuria. Denies hx of BPH or discomfort with voiding. Denies nasal congestion, increased sputum production or cough. Patient lives alone since his wife passed away last year, has no children. He used to work at a ware-house, currently retired. He is a former smoker, quit smoking like 15 years ago. Of note, patient was at [facility] ED four days ago after sliding off his cough and falling on his knee. X-ray showed b/l OA of the knees. Pt uses cane at baseline for ambulation. He has taking Tylenol and Motrin daily for pain control.      At [facility], vitals Tm 100.7, P95, 165/78, R18 - R22, Saturation 93%RA -> 95% on 2L NC. Pt received ceftriaxone 1g, azithromycin 500mg, Lasix 20mg iv, 3L NS, Tylenol 650mg. Pt transferred to [facility] for further care.     ROS: Has increased urinary frequency and occasional dysuria. Reports chronic back pain and b/l knee pain. Denies fever, chills, SOB, CP, palpitations, abdominal pain, n/v or diarrhea. Last BM yesterday. (22 May 2019 21:46)    Renal consult was placed for patient with CKD and sepsis from UTI.      PAST MEDICAL & SURGICAL HISTORY:  OA (osteoarthritis)  HTN (hypertension)  IDDM (insulin dependent diabetes mellitus)  S/P cholecystectomy  S/P appendectomy  H/O knee surgery      Allergies    No Known Allergies    Intolerances      Home Medications:   * Patient Currently Takes Medications as of 22-May-2019 22:06 documented in Structured Notes  · 	Norvasc 10 mg oral tablet: 1 tab(s) orally once a day  · 	enalapril 10 mg oral tablet: 1 tab(s) orally once a day  · 	Lasix 80 mg oral tablet: 1 tab(s) orally once a day  · 	gabapentin 300 mg oral capsule: 1 cap(s) orally once a day  · 	HumuLIN 70/30 subcutaneous suspension: 10 unit(s) subcutaneous once a day  · 	metFORMIN 500 mg oral tablet, extended release: 1 tab(s) orally once a day  · 	pravastatin 40 mg oral tablet: 1 tab(s) orally once a day  · 	repaglinide 2 mg oral tablet:   · 	VESIcare 5 mg oral tablet: 1 tab(s) orally once a day  · 	sertraline 100 mg oral tablet: 1 tab(s) orally once a day    FAMILY HISTORY:  Family history of cancer of spinal cord      SOCIAL HISTORY: Patient lives alone since his wife passed away last year, has no children. He used to work at a ware-house, currently retired. He is a former smoker, quit smoking like 15 years ago. Uses cane to ambulate. No EtOH or recreational drug use.      MEDICATIONS  (STANDING):  apixaban 2.5 milliGRAM(s) Oral every 12 hours  atorvastatin 10 milliGRAM(s) Oral at bedtime  cefTRIAXone   IVPB 2 Gram(s) IV Intermittent every 24 hours  docusate sodium 100 milliGRAM(s) Oral two times a day  gabapentin 300 milliGRAM(s) Oral daily  insulin glargine Injectable (LANTUS) 10 Unit(s) SubCutaneous at bedtime  insulin lispro (HumaLOG) corrective regimen sliding scale   SubCutaneous Before meals and at bedtime  metoprolol tartrate 12.5 milliGRAM(s) Oral two times a day  oxybutynin 5 milliGRAM(s) Oral two times a day  polyethylene glycol 3350 17 Gram(s) Oral daily  senna 2 Tablet(s) Oral at bedtime  sertraline 100 milliGRAM(s) Oral daily    MEDICATIONS  (PRN):  acetaminophen   Tablet .. 650 milliGRAM(s) Oral every 6 hours PRN Temp greater or equal to 38C (100.4F), Moderate Pain (4 - 6)      Vital Signs Last 24 Hrs  T(C): 36.7 (25 May 2019 12:47), Max: 37.6 (25 May 2019 00:57)  T(F): 98 (25 May 2019 12:47), Max: 99.6 (25 May 2019 00:57)  HR: 68 (25 May 2019 12:47) (68 - 100)  BP: 159/86 (25 May 2019 12:47) (148/86 - 161/84)  RR: 20 (25 May 2019 12:47) (20 - 24)  SpO2: 96% (25 May 2019 12:47) (94% - 96%)    REVIEW OF SYSTEMS:  Gen: No fever  CVS: No chest pain  Resp: No shortness of breath  Abd: No nausea/ no vomiting/No abdominal pain  CNS: No headache      PHYSICAL EXAM:   alert, awake, on NC, speaks full sentences  NECK: Neck supple, No JVD  CHEST/LUNG:  mild bibasilar crackles  HEART: Regular rate and rhythm. TONIA II/VI at LPSB, No gallop, no rub   ABDOMEN: Soft, Nontender, BS+nt, No flank tenderness.   EXTREMITIES: No clubbing, cyanosis, or edema, no calf tenderness  Neurology: AAOx3, no focal neurological deficit  SKIN: No rashes or lesions  : Condom cath in place,        CAPILLARY BLOOD GLUCOSE      POCT Blood Glucose.: 250 mg/dL (25 May 2019 12:05)  POCT Blood Glucose.: 154 mg/dL (25 May 2019 08:04)  POCT Blood Glucose.: 158 mg/dL (24 May 2019 21:50)  POCT Blood Glucose.: 164 mg/dL (24 May 2019 17:02)      I&O's Summary    24 May 2019 07:01  -  25 May 2019 07:00  --------------------------------------------------------  IN: 50 mL / OUT: 1400 mL / NET: -1350 mL    25 May 2019 07:01  -  25 May 2019 15:10  --------------------------------------------------------  IN: 350 mL / OUT: 400 mL / NET: -50 mL          LABS:                                                   05-25-19 @ 08:58    139  |  99  |  64<H>  ----------------------------<  170<H>  3.6   |  21<L>  |  3.20<H>                                                 05-24-19 @ 12:48    140  |  99  |  59<H>  ----------------------------<  189<H>  3.7   |  23  |  3.30<H>                                                 05-24-19 @ 07:56    138  |  99  |  52<H>  ----------------------------<  172<H>  3.4<L>   |  19<L>  |  3.17<H>                                                 05-23-19 @ 16:45    139  |  102  |  47<H>  ----------------------------<  198<H>  3.7   |  20<L>  |  3.12<H>                                                 05-23-19 @ 06:35    142  |  105  |  56<H>  ----------------------------<  116<H>  3.6   |  21<L>  |  3.30<H>                                                 05-22-19 @ 22:25    142  |  105  |  55<H>  ----------------------------<  135<H>  3.8   |  20<L>  |  3.28<H>                                                 05-22-19 @ 14:14    142  |  105  |  61<H>  ----------------------------<  143<H>  3.2<L>   |  23  |  3.67<H>    Ca    9.3      25 May 2019 08:58  Ca    9.3      24 May 2019 12:48  Ca    9.4      24 May 2019 07:56  Ca    9.0      23 May 2019 16:45  Ca    8.9      23 May 2019 06:35  Ca    8.6      22 May 2019 22:25  Ca    8.7      22 May 2019 14:14  Phos  5.5     05-25  Phos  4.1     05-23  Mg     1.9     05-25  Mg     1.8     05-24  Mg     1.9     05-23  Mg     1.8     05-23  Mg     1.7     05-22  Mg     1.3     05-22    TPro  7.3  /  Alb  2.5<L>  /  TBili  0.3  /  DBili  x   /  AST  27  /  ALT  26  /  AlkPhos  145<H>  05-24  TPro  7.4  /  Alb  2.8<L>  /  TBili  0.3  /  DBili  x   /  AST  28  /  ALT  28  /  AlkPhos  140<H>  05-23  TPro  7.3  /  Alb  2.5<L>  /  TBili  0.5  /  DBili  x   /  AST  50<H>  /  ALT  37  /  AlkPhos  137<H>  05-22                          12.8   13.37 )-----------( 238      ( 25 May 2019 08:58 )             40.2     CBC Full  -  ( 25 May 2019 08:58 )  WBC Count : 13.37 K/uL  Hemoglobin : 12.8 g/dL  Hematocrit : 40.2 %  Platelet Count - Automated : 238 K/uL  Mean Cell Volume : 81.0 fl      CBC Full  -  ( 24 May 2019 12:48 )  WBC Count : 13.12 K/uL  Hemoglobin : 13.0 g/dL  Hematocrit : 40.3 %  Platelet Count - Automated : 210 K/uL  Mean Cell Volume : 81.9 fl      CBC Full  -  ( 24 May 2019 07:56 )  WBC Count : 13.50 K/uL  Hemoglobin : 11.8 g/dL  Hematocrit : 37.3 %  Platelet Count - Automated : 193 K/uL  Mean Cell Volume : 80.7 fl      CBC Full  -  ( 23 May 2019 16:45 )  WBC Count : 14.67 K/uL  Hemoglobin : 12.6 g/dL  Hematocrit : 38.5 %  Platelet Count - Automated : 186 K/uL  Mean Cell Volume : 81.2 fl      CBC Full  -  ( 23 May 2019 06:35 )  WBC Count : 14.49 K/uL  Hemoglobin : 11.4 g/dL  Hematocrit : 36.3 %  Platelet Count - Automated : 164 K/uL  Mean Cell Volume : 82.9 fl      CBC Full  -  ( 22 May 2019 14:14 )  WBC Count : 15.0 K/uL  Hemoglobin : 11.8 g/dL  Hematocrit : 35.4 %  Platelet Count - Automated : 166 K/uL  Mean Cell Volume : 79.2 fL        PT/INR - ( 25 May 2019 08:58 )   PT: 15.7 sec;   INR: 1.38          PTT - ( 25 May 2019 08:58 )  PTT:30.1 sec          RADIOLOGY & ADDITIONAL TESTS:

## 2019-05-25 NOTE — PROGRESS NOTE ADULT - PROBLEM SELECTOR PLAN 1
Present on arrival, T 100.7, HR 90s,, RR >20, with WBC 15; now resolved. Secondary urinary tract infection c/b gram negative bacteremia. Adequately fluid resuscitated with 3L NS, s/p CTX/Azithromycin. CXR without focal consolidation. UCx NGTD however this was on antibiotics. Initial blood cultures growing E. coli susceptible to Ceftriaxone.  - c/w CTX 2g q24h for coverage of bacteremia - will require two week course of appropriate antibiotic therapy from last set of negative cultures (through 6/7)  - Surveillance cultures with NGTD

## 2019-05-25 NOTE — PROGRESS NOTE ADULT - ASSESSMENT
Sepsis    CHF  Cardiomyopathy   Atrial Arrhythmias   A flutter    1.	Transfer to [facility]  for Monitoring and medicat      Parisa CARTER Sepsis    CHF  Cardiomyopathy   Atrial Arrhythmias   A flutter    1.	Transfer to [facility]  for Monitoring and medication adjustment   2.	EPS  follow up     CAITLYN Mccauley MD

## 2019-05-25 NOTE — PROGRESS NOTE ADULT - PROBLEM SELECTOR PLAN 5
BP stable  -holding home amlodipine 10mg and enalapril 10mg in setting of sepsis  -continue metoprolol 25mg BID Pt denies hx of HF but on Furosemide 80mg QD at home. On admission BNP 3296 s/p 3L IVF and lasix 20mg IV x1 at [facility] and Lasix 40mg IV x 2 on [facility] . Pt euvolemic on exam  -CXR on admission: Mild pulmonary venous congestion  -Echo: moderate global hypokinesis, concentric LVH, EF 35%  -discuss with Dr. Mccauley if should continue on furosemide given MANAN on CKD

## 2019-05-25 NOTE — PROGRESS NOTE ADULT - SUBJECTIVE AND OBJECTIVE BOX
Transfer to Cardiology Service  Hospital Course:  Patient is a 77yo M with a PMHx of DM, HTN, OA BIBA to [facility] for generalized weakness, increased urinary frequency and intermittent dysuria. Patient found to have sepsis secondary to E. coli bacteremia 2/2 UTI on admission, now resolving. Hospital course c/b the development of new onset atrial flutter and globally depressed EF, likely due to the concomitant sepsis. Patient started on Ceftriaxone 2g Q24H with resolution of sepsis and clearance of bacteremia. Started on anticoagulation and beta blockade for atrial flutter. Cardiology consulted for management of atrial flutter given resolving sepsis and decision made to transfer patient to cardiology service for telemetry monitoring and further management of atrial flutter.    INTERVAL HPI/OVERNIGHT EVENTS: Patient seen and examined at bedside. No acute events overnight. Patient complaining of pain/numbness in the b/l LE that has been constant over time, preventing him from walking. No fevers or chills, no cough, SOB, CP, palpitations, n/v/d/c. Aside from his feet denies complaints. He worries about his current condition given his father's cardiac history.    VITAL SIGNS:  T(F): 98 (05-25-19 @ 12:47)  HR: 68 (05-25-19 @ 12:47)  BP: 159/86 (05-25-19 @ 12:47)  RR: 20 (05-25-19 @ 12:47)  SpO2: 96% (05-25-19 @ 12:47)  Wt(kg): --    PHYSICAL EXAM:    General: WDWN, resting comfortably, tired appearing, no acute distress  HEENT: NC/AT; PERRL, anicteric sclera; MMM  Neck: supple  Cardiovascular: +S1/S2, irregular, sounds tachycardic however peripheral pulses within normal rate range  Respiratory: CTA B/L; no wheezes or rhonchi, mild bibasilar crackles, non-labored breathing, able to speak in full sentences. Nasal canula in place   Gastrointestinal: soft, obese, mild epigastric tenderness to palpation, softly distended, +BSx4  : Condom cath in place  Extremities: WWP; no edema, clubbing or cyanosis, 2+ irregular pulses, good capillary refill  Vascular: 2+ radial, DP/PT pulses B/L  Neurological: AAOx3; no focal deficits/   MSK: 5/5 UE strength, 4/5 strength LE, sensation in tact    MEDICATIONS  (STANDING):  apixaban 2.5 milliGRAM(s) Oral every 12 hours  atorvastatin 10 milliGRAM(s) Oral at bedtime  cefTRIAXone   IVPB 2 Gram(s) IV Intermittent every 24 hours  docusate sodium 100 milliGRAM(s) Oral two times a day  gabapentin 300 milliGRAM(s) Oral daily  insulin glargine Injectable (LANTUS) 10 Unit(s) SubCutaneous at bedtime  insulin lispro (HumaLOG) corrective regimen sliding scale   SubCutaneous Before meals and at bedtime  metoprolol tartrate 12.5 milliGRAM(s) Oral two times a day  oxybutynin 5 milliGRAM(s) Oral two times a day  polyethylene glycol 3350 17 Gram(s) Oral daily  senna 2 Tablet(s) Oral at bedtime  sertraline 100 milliGRAM(s) Oral daily    MEDICATIONS  (PRN):  acetaminophen   Tablet .. 650 milliGRAM(s) Oral every 6 hours PRN Temp greater or equal to 38C (100.4F), Moderate Pain (4 - 6)      Allergies    No Known Allergies    Intolerances        LABS:                        12.8   13.37 )-----------( 238      ( 25 May 2019 08:58 )             40.2     05-25    139  |  99  |  64<H>  ----------------------------<  170<H>  3.6   |  21<L>  |  3.20<H>    Ca    9.3      25 May 2019 08:58  Phos  5.5     05-25  Mg     1.9     05-25    TPro  7.3  /  Alb  2.5<L>  /  TBili  0.3  /  DBili  x   /  AST  27  /  ALT  26  /  AlkPhos  145<H>  05-24    PT/INR - ( 25 May 2019 08:58 )   PT: 15.7 sec;   INR: 1.38          PTT - ( 25 May 2019 08:58 )  PTT:30.1 sec      RADIOLOGY & ADDITIONAL TESTS: Reviewed

## 2019-05-25 NOTE — PROGRESS NOTE ADULT - PROBLEM SELECTOR PLAN 6
Pt with h/o CAD, non-obstructive, no h/o stents. No history of CHF. Follows up with Dr. Neil, last echo from 03/2019 with EF 50-55%, LVH, no other WMA. Medications include amlodipine, enalapril, lasix   - Echo from this admission with moderate global hypokinesis, concentric LVH, EF 35% however patient noted to be tachycardic >130s during exam, and patient is septic.  - will need repeat echo outpatient.  - continue lipitor 10mg qhs    #Heart failure with reduced EF- pt w/ no previous history of CHF, however echo with EF 35% here, with moderate global hypokinesis likely 2/2 sepsis   - will restart PO lasix based on volume status   - monitor I/Os

## 2019-05-26 LAB
ANION GAP SERPL CALC-SCNC: 17 MMOL/L — SIGNIFICANT CHANGE UP (ref 5–17)
BUN SERPL-MCNC: 68 MG/DL — HIGH (ref 7–23)
CALCIUM SERPL-MCNC: 9.4 MG/DL — SIGNIFICANT CHANGE UP (ref 8.4–10.5)
CHLORIDE SERPL-SCNC: 100 MMOL/L — SIGNIFICANT CHANGE UP (ref 96–108)
CO2 SERPL-SCNC: 21 MMOL/L — LOW (ref 22–31)
CREAT SERPL-MCNC: 3 MG/DL — HIGH (ref 0.5–1.3)
GLUCOSE BLDC GLUCOMTR-MCNC: 169 MG/DL — HIGH (ref 70–99)
GLUCOSE BLDC GLUCOMTR-MCNC: 196 MG/DL — HIGH (ref 70–99)
GLUCOSE BLDC GLUCOMTR-MCNC: 198 MG/DL — HIGH (ref 70–99)
GLUCOSE BLDC GLUCOMTR-MCNC: 203 MG/DL — HIGH (ref 70–99)
GLUCOSE BLDC GLUCOMTR-MCNC: 303 MG/DL — HIGH (ref 70–99)
GLUCOSE SERPL-MCNC: 178 MG/DL — HIGH (ref 70–99)
HCT VFR BLD CALC: 41 % — SIGNIFICANT CHANGE UP (ref 39–50)
HGB BLD-MCNC: 13.1 G/DL — SIGNIFICANT CHANGE UP (ref 13–17)
MAGNESIUM SERPL-MCNC: 2.2 MG/DL — SIGNIFICANT CHANGE UP (ref 1.6–2.6)
MCHC RBC-ENTMCNC: 25.8 PG — LOW (ref 27–34)
MCHC RBC-ENTMCNC: 32 GM/DL — SIGNIFICANT CHANGE UP (ref 32–36)
MCV RBC AUTO: 80.7 FL — SIGNIFICANT CHANGE UP (ref 80–100)
NRBC # BLD: 0 /100 WBCS — SIGNIFICANT CHANGE UP (ref 0–0)
PHOSPHATE SERPL-MCNC: 5.6 MG/DL — HIGH (ref 2.5–4.5)
PLATELET # BLD AUTO: 264 K/UL — SIGNIFICANT CHANGE UP (ref 150–400)
POTASSIUM SERPL-MCNC: 3.5 MMOL/L — SIGNIFICANT CHANGE UP (ref 3.5–5.3)
POTASSIUM SERPL-SCNC: 3.5 MMOL/L — SIGNIFICANT CHANGE UP (ref 3.5–5.3)
RBC # BLD: 5.08 M/UL — SIGNIFICANT CHANGE UP (ref 4.2–5.8)
RBC # FLD: 15.1 % — HIGH (ref 10.3–14.5)
SODIUM SERPL-SCNC: 138 MMOL/L — SIGNIFICANT CHANGE UP (ref 135–145)
WBC # BLD: 14.9 K/UL — HIGH (ref 3.8–10.5)
WBC # FLD AUTO: 14.9 K/UL — HIGH (ref 3.8–10.5)

## 2019-05-26 PROCEDURE — 99223 1ST HOSP IP/OBS HIGH 75: CPT | Mod: GC

## 2019-05-26 RX ORDER — POTASSIUM CHLORIDE 20 MEQ
20 PACKET (EA) ORAL ONCE
Refills: 0 | Status: COMPLETED | OUTPATIENT
Start: 2019-05-26 | End: 2019-05-26

## 2019-05-26 RX ADMIN — ATORVASTATIN CALCIUM 10 MILLIGRAM(S): 80 TABLET, FILM COATED ORAL at 22:25

## 2019-05-26 RX ADMIN — SERTRALINE 100 MILLIGRAM(S): 25 TABLET, FILM COATED ORAL at 11:19

## 2019-05-26 RX ADMIN — Medication 2: at 07:41

## 2019-05-26 RX ADMIN — Medication 8: at 22:25

## 2019-05-26 RX ADMIN — Medication 5 MILLIGRAM(S): at 06:32

## 2019-05-26 RX ADMIN — Medication 2: at 11:19

## 2019-05-26 RX ADMIN — Medication 650 MILLIGRAM(S): at 09:25

## 2019-05-26 RX ADMIN — GABAPENTIN 300 MILLIGRAM(S): 400 CAPSULE ORAL at 11:19

## 2019-05-26 RX ADMIN — SENNA PLUS 2 TABLET(S): 8.6 TABLET ORAL at 22:25

## 2019-05-26 RX ADMIN — APIXABAN 2.5 MILLIGRAM(S): 2.5 TABLET, FILM COATED ORAL at 06:32

## 2019-05-26 RX ADMIN — INSULIN GLARGINE 10 UNIT(S): 100 INJECTION, SOLUTION SUBCUTANEOUS at 22:25

## 2019-05-26 RX ADMIN — Medication 25 MILLIGRAM(S): at 05:43

## 2019-05-26 RX ADMIN — APIXABAN 2.5 MILLIGRAM(S): 2.5 TABLET, FILM COATED ORAL at 17:33

## 2019-05-26 RX ADMIN — Medication 100 MILLIGRAM(S): at 06:36

## 2019-05-26 RX ADMIN — Medication 20 MILLIEQUIVALENT(S): at 11:19

## 2019-05-26 RX ADMIN — CEFTRIAXONE 100 GRAM(S): 500 INJECTION, POWDER, FOR SOLUTION INTRAMUSCULAR; INTRAVENOUS at 06:32

## 2019-05-26 RX ADMIN — Medication 2: at 16:28

## 2019-05-26 RX ADMIN — Medication 25 MILLIGRAM(S): at 17:33

## 2019-05-26 RX ADMIN — Medication 650 MILLIGRAM(S): at 10:25

## 2019-05-26 NOTE — PROGRESS NOTE ADULT - PROBLEM SELECTOR PLAN 7
A1c 7.3  -continue Lantus 10 units qhs and ISS  -continue home Gabapentin      VTE ppx: on Eliquis  Dispo: Continue IV Abx for Bacteremia.  Continue management of Aflutter.  EP consult. PT Recs [id number]

## 2019-05-26 NOTE — PROGRESS NOTE ADULT - PROBLEM SELECTOR PLAN 1
Sepsis secondary to urinary tract infection, now resolved  - Continue leukocytosis 14.90, afebrile.    - urinary tract infection c/b gram negative bacteremia.  - CXR without focal consolidation.   - Initial blood CX Initial blood cultures growing E. coli susceptible to Ceftriaxone, Surveillance blood Cx negative to date.   - UCx NGTD however this was on antibiotics.   - CT abd w/o contrast w/ minimal hydronephrosis w/ perinephric stranding, findings with possible pyelonephritis or recently passed kidney stone.  - US retroperitoneal negative for hydronephrosis  - continue CTX 2g q24h for coverage of bacteremia, will require two week course of appropriate antibiotic therapy from last set of negative cultures (through 6/7)  - per renal holding oxybutynin as can increase risk of UTI. Sepsis secondary to urinary tract infection, now resolved  - Continue leukocytosis 15.11, remains afebrile.    - urinary tract infection c/b gram negative bacteremia.  - CXR without focal consolidation.   - Initial blood CX Initial blood cultures growing E. coli susceptible to Ceftriaxone, Surveillance blood Cx negative to date.   - UCx NGTD however this was on antibiotics.   - CT abd w/o contrast w/ minimal hydronephrosis w/ perinephric stranding, findings with possible pyelonephritis or recently passed kidney stone.  - US retroperitoneal negative for hydronephrosis  - continue CTX 2g q24h for coverage of bacteremia, will require two week course of appropriate antibiotic therapy from last set of negative cultures (through 6/7)  - per renal holding oxybutynin as can increase risk of UTI.

## 2019-05-26 NOTE — PROGRESS NOTE ADULT - SUBJECTIVE AND OBJECTIVE BOX
Interventional Cardiology PA Adult Progress Note    Subjective Assessment: Patient seen and examinted at bedside, states he is feeling okay, very weak.  unable to stand.  denies SOB or chest pain  ROS negative except per HPI and subjective  	  MEDICATIONS:  metoprolol tartrate 25 milliGRAM(s) Oral two times a day  cefTRIAXone   IVPB 2 Gram(s) IV Intermittent every 24 hours  acetaminophen   Tablet .. 650 milliGRAM(s) Oral every 6 hours PRN  gabapentin 300 milliGRAM(s) Oral daily  sertraline 100 milliGRAM(s) Oral daily  docusate sodium 100 milliGRAM(s) Oral two times a day  polyethylene glycol 3350 17 Gram(s) Oral daily  senna 2 Tablet(s) Oral at bedtime  atorvastatin 10 milliGRAM(s) Oral at bedtime  insulin glargine Injectable (LANTUS) 10 Unit(s) SubCutaneous at bedtime  insulin lispro (HumaLOG) corrective regimen sliding scale   SubCutaneous Before meals and at bedtime  apixaban 2.5 milliGRAM(s) Oral every 12 hours  oxybutynin 5 milliGRAM(s) Oral two times a day  	    [PHYSICAL EXAM:  TELEMETRY:  T(C): 36.3 (05-26-19 @ 13:57), Max: 37 (05-25-19 @ 22:23)  HR: 99 (05-26-19 @ 11:24) (93 - 145)  BP: 125/76 (05-26-19 @ 11:24) (125/76 - 161/90)  RR: 18 (05-26-19 @ 11:24) (18 - 21)  SpO2: 94% (05-26-19 @ 11:24) (90% - 95%)    I&O's Summary    25 May 2019 07:01  -  26 May 2019 07:00  --------------------------------------------------------  IN: 530 mL / OUT: 1275 mL / NET: -745 mL    26 May 2019 07:01  -  26 May 2019 14:46  --------------------------------------------------------  IN: 150 mL / OUT: 0 mL / NET: 150 mL                                      Appearance: Normal	  HEENT:   Normal oral mucosa, PERRL, EOMI	  Neck: Supple, - JVD; Carotid Bruit   Cardiovascular: Normal S1 S2, No JVD, No murmurs,   Respiratory: Lungs clear to auscultation/No Rales, Rhonchi, Wheezing	  Gastrointestinal:  Soft, Non-tender, + BS	  Skin: No rashes, No ecchymoses, No cyanosis  Extremities: Normal range of motion, No clubbing, cyanosis or edema  Vascular: Peripheral pulses palpable 2+ bilaterally  Neurologic: Non-focal  Psychiatry: A & O x 3, Mood & affect appropriate    	    LABS:	 	  CARDIAC MARKERS:                          13.1   14.90 )-----------( 264      ( 26 May 2019 05:45 )             41.0     05-26    138  |  100  |  68<H>  ----------------------------<  178<H>  3.5   |  21<L>  |  3.00<H>    Ca    9.4      26 May 2019 05:46  Phos  5.6     05-26  Mg     2.2     05-26      PT/INR - ( 25 May 2019 08:58 )   PT: 15.7 sec;   INR: 1.38          PTT - ( 25 May 2019 08:58 )  PTT:30.1 sec    ASSESSMENT/PLAN:

## 2019-05-26 NOTE — PROGRESS NOTE ADULT - SUBJECTIVE AND OBJECTIVE BOX
Pt is still tachycardic  re A Fib Flutter with RVR     PAST MEDICAL & SURGICAL HISTORY:  OA (osteoarthritis)  HTN (hypertension)  IDDM (insulin dependent diabetes mellitus)  S/P cholecystectomy  S/P appendectomy  H/O knee surgery    MEDICATIONS  (STANDING):  apixaban 2.5 milliGRAM(s) Oral every 12 hours  atorvastatin 10 milliGRAM(s) Oral at bedtime  cefTRIAXone   IVPB 2 Gram(s) IV Intermittent every 24 hours  docusate sodium 100 milliGRAM(s) Oral two times a day  gabapentin 300 milliGRAM(s) Oral daily  insulin glargine Injectable (LANTUS) 10 Unit(s) SubCutaneous at bedtime  insulin lispro (HumaLOG) corrective regimen sliding scale   SubCutaneous Before meals and at bedtime  metoprolol tartrate 25 milliGRAM(s) Oral two times a day  oxybutynin 5 milliGRAM(s) Oral two times a day  polyethylene glycol 3350 17 Gram(s) Oral daily  potassium chloride    Tablet ER 20 milliEquivalent(s) Oral once  senna 2 Tablet(s) Oral at bedtime  sertraline 100 milliGRAM(s) Oral daily    MEDICATIONS  (PRN):  acetaminophen   Tablet .. 650 milliGRAM(s) Oral every 6 hours PRN Temp greater or equal to 38C (100.4F), Moderate Pain (4 - 6)      ICU Vital Signs Last 24 Hrs  T(C): 36.8 (26 May 2019 10:12), Max: 37 (25 May 2019 22:23)  T(F): 98.2 (26 May 2019 10:12), Max: 98.6 (25 May 2019 22:23)  HR: 103 (26 May 2019 09:00) (68 - 145)  BP: 161/90 (26 May 2019 09:00) (137/77 - 161/90)  BP(mean): 112 (26 May 2019 05:08) (107 - 112)  ABP: --  ABP(mean): --  RR: 18 (26 May 2019 09:00) (18 - 21)  SpO2: 94% (26 May 2019 09:00) (90% - 96%)        lungs decreased breath sounds at bases  cv   s1 s2  abd soft  ext stable                          13.1   14.90 )-----------( 264      ( 26 May 2019 05:45 )             41.0   05-26    138  |  100  |  68<H>  ----------------------------<  178<H>  3.5   |  21<L>  |  3.00<H>    Ca    9.4      26 May 2019 05:46  Phos  5.6     05-26  Mg     2.2     05-26

## 2019-05-26 NOTE — PROGRESS NOTE ADULT - SUBJECTIVE AND OBJECTIVE BOX
Renal consult was placed for patient with CKD and sepsis from UTI. He was seen and examined at bedside. He had no new complaints.        Meds:    acetaminophen   Tablet .. 650 milliGRAM(s) Oral every 6 hours PRN  apixaban 2.5 milliGRAM(s) Oral every 12 hours  atorvastatin 10 milliGRAM(s) Oral at bedtime  cefTRIAXone   IVPB 2 Gram(s) IV Intermittent every 24 hours  docusate sodium 100 milliGRAM(s) Oral two times a day  gabapentin 300 milliGRAM(s) Oral daily  insulin glargine Injectable (LANTUS) 10 Unit(s) SubCutaneous at bedtime  insulin lispro (HumaLOG) corrective regimen sliding scale   SubCutaneous Before meals and at bedtime  metoprolol tartrate 25 milliGRAM(s) Oral two times a day  polyethylene glycol 3350 17 Gram(s) Oral daily  senna 2 Tablet(s) Oral at bedtime  sertraline 100 milliGRAM(s) Oral daily      T(C): 36.3 (05-26-19 @ 13:57), Max: 37 (05-25-19 @ 22:23)  HR: 99 (05-26-19 @ 11:24) (93 - 145)  BP: 125/76 (05-26-19 @ 11:24) (125/76 - 161/90)  RR: 18 (05-26-19 @ 11:24) (18 - 21)  SpO2: 94% (05-26-19 @ 11:24) (90% - 95%)    Input/Output      05-25-19 @ 07:01  -  05-26-19 @ 07:00  --------------------------------------------------------  IN: 530 mL / OUT: 1275 mL / NET: -745 mL    05-26-19 @ 07:01  -  05-26-19 @ 15:08  --------------------------------------------------------  IN: 150 mL / OUT: 0 mL / NET: 150 mL            ROS:     Gen: no fever  Cardiovascular: no chest pain  Respiratory: no cough, no sputum  Gastrointestinal: no nausea, no vomiting, no change in bowel habits  Neurologic: no headache  : no urinary symptoms          PHYSICAL EXAM:   alert, awake, on NC, speaks full sentences  NECK: Neck supple, No JVD  CHEST/LUNG:  mild bibasilar crackles  HEART: Regular rate and rhythm. TONIA II/VI at LPSB, No gallop, no rub   ABDOMEN: Soft, Nontender, BS+nt, No flank tenderness.   EXTREMITIES: No clubbing, cyanosis, or edema, no calf tenderness  Neurology: AAOx3, no focal neurological deficit  SKIN: No rashes or lesions              LABS:                            13.1   14.90 )-----------( 264      ( 26 May 2019 05:45 )             41.0       05-26    138  |  100  |  68<H>  ----------------------------<  178<H>  3.5   |  21<L>  |  3.00<H>    Ca    9.4      26 May 2019 05:46  Phos  5.6     05-26  Mg     2.2     05-26        PT/INR - ( 25 May 2019 08:58 )   PT: 15.7 sec;   INR: 1.38          PTT - ( 25 May 2019 08:58 )  PTT:30.1 sec                                                             05-25-19 @ 08:58    139  |  99  |  64<H>  ----------------------------<  170<H>  3.6   |  21<L>  |  3.20<H>                                                 05-24-19 @ 12:48    140  |  99  |  59<H>  ----------------------------<  189<H>  3.7   |  23  |  3.30<H>                                                 05-24-19 @ 07:56    138  |  99  |  52<H>  ----------------------------<  172<H>  3.4<L>   |  19<L>  |  3.17<H>                                                 05-23-19 @ 16:45    139  |  102  |  47<H>  ----------------------------<  198<H>  3.7   |  20<L>  |  3.12<H>                                                 05-23-19 @ 06:35    142  |  105  |  56<H>  ----------------------------<  116<H>  3.6   |  21<L>  |  3.30<H>                                                 05-22-19 @ 22:25    142  |  105  |  55<H>  ----------------------------<  135<H>  3.8   |  20<L>  |  3.28<H>                                                 05-22-19 @ 14:14    142  |  105  |  61<H>  ----------------------------<  143<H>  3.2<L>   |  23  |  3.67<H>    Ca    9.3      25 May 2019 08:58  Ca    9.3      24 May 2019 12:48  Ca    9.4      24 May 2019 07:56  Ca    9.0      23 May 2019 16:45  Ca    8.9      23 May 2019 06:35  Ca    8.6      22 May 2019 22:25  Ca    8.7      22 May 2019 14:14  Phos  5.5     05-25  Phos  4.1     05-23  Mg     1.9     05-25  Mg     1.8     05-24  Mg     1.9     05-23  Mg     1.8     05-23  Mg     1.7     05-22  Mg     1.3     05-22    TPro  7.3  /  Alb  2.5<L>  /  TBili  0.3  /  DBili  x   /  AST  27  /  ALT  26  /  AlkPhos  145<H>  05-24  TPro  7.4  /  Alb  2.8<L>  /  TBili  0.3  /  DBili  x   /  AST  28  /  ALT  28  /  AlkPhos  140<H>  05-23  TPro  7.3  /  Alb  2.5<L>  /  TBili  0.5  /  DBili  x   /  AST  50<H>  /  ALT  37  /  AlkPhos  137<H>  05-22                          12.8   13.37 )-----------( 238      ( 25 May 2019 08:58 )             40.2     CBC Full  -  ( 25 May 2019 08:58 )  WBC Count : 13.37 K/uL  Hemoglobin : 12.8 g/dL  Hematocrit : 40.2 %  Platelet Count - Automated : 238 K/uL  Mean Cell Volume : 81.0 fl      CBC Full  -  ( 24 May 2019 12:48 )  WBC Count : 13.12 K/uL  Hemoglobin : 13.0 g/dL  Hematocrit : 40.3 %  Platelet Count - Automated : 210 K/uL  Mean Cell Volume : 81.9 fl      CBC Full  -  ( 24 May 2019 07:56 )  WBC Count : 13.50 K/uL  Hemoglobin : 11.8 g/dL  Hematocrit : 37.3 %  Platelet Count - Automated : 193 K/uL  Mean Cell Volume : 80.7 fl      CBC Full  -  ( 23 May 2019 16:45 )  WBC Count : 14.67 K/uL  Hemoglobin : 12.6 g/dL  Hematocrit : 38.5 %  Platelet Count - Automated : 186 K/uL  Mean Cell Volume : 81.2 fl      CBC Full  -  ( 23 May 2019 06:35 )  WBC Count : 14.49 K/uL  Hemoglobin : 11.4 g/dL  Hematocrit : 36.3 %  Platelet Count - Automated : 164 K/uL  Mean Cell Volume : 82.9 fl      CBC Full  -  ( 22 May 2019 14:14 )  WBC Count : 15.0 K/uL  Hemoglobin : 11.8 g/dL  Hematocrit : 35.4 %  Platelet Count - Automated : 166 K/uL  Mean Cell Volume : 79.2 fL        PT/INR - ( 25 May 2019 08:58 )   PT: 15.7 sec;   INR: 1.38          PTT - ( 25 May 2019 08:58 )  PTT:30.1 sec          RADIOLOGY & ADDITIONAL TESTS:

## 2019-05-26 NOTE — PROGRESS NOTE ADULT - PROBLEM SELECTOR PLAN 5
BP stable.    -holding home amlodipine 10mg and enalapril 10mg in setting of sepsis  -continue metoprolol 25mg BID

## 2019-05-26 NOTE — PROGRESS NOTE ADULT - PROBLEM SELECTOR PLAN 3
MANAN on CKD now improving. (baseline Crt 1.8-1.9, diabetic nephropathy)  - Cr 3.67 on arrival s/p 3L IVF at [facility]  - Crt now improving today to 68/3.   - FeUrea 49.6 c/w intrinsic renal injury   - Hold home Enalapril and Lasix  - renal consulted, f/u recs  - retroperitoneal US: B/L renal cysts measuring up to 5.5 cm on the right and 5.1cm on the left, negative for hydronephrosis

## 2019-05-26 NOTE — PROGRESS NOTE ADULT - PROBLEM SELECTOR PLAN 2
New Aflutter.  Transferred to [facility] for further management  - HR now improved 90s, on tele can bump to 120-130s.   - Continue lopressor to 25mg BID   - continue Eliquis 2.5mg BID  - Will plan for EP consult for further management.

## 2019-05-26 NOTE — PROGRESS NOTE ADULT - ASSESSMENT
78M with h/o IDDM, HTN, OA BIBA to [facility] for generalized weakness, found to have sepsis 2/2 UTI, now with E.coli bacteremia, being treated with Ceftriaxone (started 5/22 ).    # MANAN on CKD VS CKD IV based on stable Cr values since admission  - Cr stable at 3.0-3.3  - Suggest d/c home Metformin  - suggest intermittent bladder scan  - recheck urinalysis and prot/creatinine ratio after resolution of UTI  - baseline Cr unclear at this time, patient has a h/o diabetic nephropathy as per chart review      # Sepsis secondary to UTI  - on arrival, T 100.7, HR 90s,, RR >20, with WBC 15.  - Secondary urinary tract infection c/b gram negative bacteremia.   -  s/p CTX/Azithromycin.  - URINE Cx NGTD however this was on antibiotics   - c/w CTX 2g q24h for coverage of bacteremia (5/22- )  - Urinalysis positive for bacteria and LE  - suggest holding oxybutinin for now   - US retroperitoneal negative for hydronephrosis  - CT abd w/o contrast w/ minimal hydronephrosis w/ perinephric stranding, findings with possible pyelonephritis or recently passed kidney stone.     # Atrial flutter.    - EKG with possible A-flutter w/ variable block,   - started lopressor 12.5 mg BID   - on Eliquis 2.5mg BID  - resume Lasix as needed	    # HTN (hypertension).   - avoid ace/arb      #Microcytic Anemia - Iron studies w/ mixed TORREY and ACD  - hb 11.8  - transfuse as per primary team if hb < 7  - % sat 5, ferritin 223      # IDDM (insulin dependent diabetes mellitus).  - suggest discontinuing home metformin due to CKD stage       # Osteoarthritis.   - chronic OA of bilateral knees, uses cane for ambulation.   - States his ambulation has been deteriorating with worsening pain. Takes tylenol/motrin daily for pain control .Xrays inpatient c/w OA.   - PT eval  - avoid NSAIDs       #Overactive bladder   - suggest holding oxybutynin 5mg qd for now  - increases myra of UTI from urinary stasis  - suggest bladder scan

## 2019-05-26 NOTE — PROGRESS NOTE ADULT - PROBLEM SELECTOR PLAN 6
Trop 0.07 --> 0.08, asymptomatic, EKG with no acute ST/T-wave changes. Likely secondary to demand ischemia in setting of sepsis/bacteremia  - hx of non obstructive, follows up with Dr. Neil  - continue metoprolol 25mg BID and pravastatin 40mg

## 2019-05-26 NOTE — PROGRESS NOTE ADULT - PROBLEM SELECTOR PLAN 4
Pt denies hx of HF but on Furosemide 80mg QD at home. On admission BNP 3296 (received fluid while @ [facility]).   - remains euvolemic on exam. denies SOB, no edema.   - CXR on admission: Mild pulmonary venous congestion  - Echo: moderate global hypokinesis, concentric LVH, EF 35%  - Holding Lasix for now given MANAN.

## 2019-05-26 NOTE — PROGRESS NOTE ADULT - ASSESSMENT
78M with h/o IDDM, HTN and OA who was BIBA to [facility] for generalized weakness, found to have sepsis 2/2 E. coli bacteremia 2/2 UTI with new onset atrial flutter, now transferred to [facility] for further management.

## 2019-05-27 LAB
ANION GAP SERPL CALC-SCNC: 17 MMOL/L — SIGNIFICANT CHANGE UP (ref 5–17)
BUN SERPL-MCNC: 79 MG/DL — HIGH (ref 7–23)
CALCIUM SERPL-MCNC: 9 MG/DL — SIGNIFICANT CHANGE UP (ref 8.4–10.5)
CHLORIDE SERPL-SCNC: 103 MMOL/L — SIGNIFICANT CHANGE UP (ref 96–108)
CO2 SERPL-SCNC: 21 MMOL/L — LOW (ref 22–31)
CREAT SERPL-MCNC: 3.01 MG/DL — HIGH (ref 0.5–1.3)
GLUCOSE BLDC GLUCOMTR-MCNC: 127 MG/DL — HIGH (ref 70–99)
GLUCOSE BLDC GLUCOMTR-MCNC: 168 MG/DL — HIGH (ref 70–99)
GLUCOSE BLDC GLUCOMTR-MCNC: 203 MG/DL — HIGH (ref 70–99)
GLUCOSE BLDC GLUCOMTR-MCNC: 204 MG/DL — HIGH (ref 70–99)
GLUCOSE SERPL-MCNC: 113 MG/DL — HIGH (ref 70–99)
HCT VFR BLD CALC: 39.1 % — SIGNIFICANT CHANGE UP (ref 39–50)
HGB BLD-MCNC: 12.2 G/DL — LOW (ref 13–17)
MAGNESIUM SERPL-MCNC: 2.4 MG/DL — SIGNIFICANT CHANGE UP (ref 1.6–2.6)
MCHC RBC-ENTMCNC: 25.5 PG — LOW (ref 27–34)
MCHC RBC-ENTMCNC: 31.2 GM/DL — LOW (ref 32–36)
MCV RBC AUTO: 81.8 FL — SIGNIFICANT CHANGE UP (ref 80–100)
NRBC # BLD: 0 /100 WBCS — SIGNIFICANT CHANGE UP (ref 0–0)
PLATELET # BLD AUTO: 284 K/UL — SIGNIFICANT CHANGE UP (ref 150–400)
POTASSIUM SERPL-MCNC: 3.6 MMOL/L — SIGNIFICANT CHANGE UP (ref 3.5–5.3)
POTASSIUM SERPL-SCNC: 3.6 MMOL/L — SIGNIFICANT CHANGE UP (ref 3.5–5.3)
RBC # BLD: 4.78 M/UL — SIGNIFICANT CHANGE UP (ref 4.2–5.8)
RBC # FLD: 15 % — HIGH (ref 10.3–14.5)
RETICS #: 29.6 K/UL — SIGNIFICANT CHANGE UP (ref 25–125)
RETICS/RBC NFR: 0.6 % — SIGNIFICANT CHANGE UP (ref 0.5–2.5)
SODIUM SERPL-SCNC: 141 MMOL/L — SIGNIFICANT CHANGE UP (ref 135–145)
WBC # BLD: 15.11 K/UL — HIGH (ref 3.8–10.5)
WBC # FLD AUTO: 15.11 K/UL — HIGH (ref 3.8–10.5)

## 2019-05-27 PROCEDURE — 99233 SBSQ HOSP IP/OBS HIGH 50: CPT | Mod: GC

## 2019-05-27 PROCEDURE — 99223 1ST HOSP IP/OBS HIGH 75: CPT

## 2019-05-27 PROCEDURE — 71045 X-RAY EXAM CHEST 1 VIEW: CPT | Mod: 26

## 2019-05-27 RX ORDER — POTASSIUM CHLORIDE 20 MEQ
20 PACKET (EA) ORAL ONCE
Refills: 0 | Status: COMPLETED | OUTPATIENT
Start: 2019-05-27 | End: 2019-05-27

## 2019-05-27 RX ORDER — GABAPENTIN 400 MG/1
300 CAPSULE ORAL THREE TIMES A DAY
Refills: 0 | Status: DISCONTINUED | OUTPATIENT
Start: 2019-05-27 | End: 2019-06-04

## 2019-05-27 RX ORDER — APIXABAN 2.5 MG/1
5 TABLET, FILM COATED ORAL EVERY 12 HOURS
Refills: 0 | Status: DISCONTINUED | OUTPATIENT
Start: 2019-05-27 | End: 2019-05-29

## 2019-05-27 RX ADMIN — Medication 2: at 16:44

## 2019-05-27 RX ADMIN — Medication 20 MILLIEQUIVALENT(S): at 09:34

## 2019-05-27 RX ADMIN — Medication 650 MILLIGRAM(S): at 10:34

## 2019-05-27 RX ADMIN — GABAPENTIN 300 MILLIGRAM(S): 400 CAPSULE ORAL at 22:01

## 2019-05-27 RX ADMIN — ATORVASTATIN CALCIUM 10 MILLIGRAM(S): 80 TABLET, FILM COATED ORAL at 22:01

## 2019-05-27 RX ADMIN — SERTRALINE 100 MILLIGRAM(S): 25 TABLET, FILM COATED ORAL at 13:07

## 2019-05-27 RX ADMIN — APIXABAN 2.5 MILLIGRAM(S): 2.5 TABLET, FILM COATED ORAL at 05:44

## 2019-05-27 RX ADMIN — Medication 100 MILLIGRAM(S): at 05:44

## 2019-05-27 RX ADMIN — Medication 4: at 22:00

## 2019-05-27 RX ADMIN — Medication 20 MILLIEQUIVALENT(S): at 07:23

## 2019-05-27 RX ADMIN — Medication 25 MILLIGRAM(S): at 19:31

## 2019-05-27 RX ADMIN — Medication 25 MILLIGRAM(S): at 05:45

## 2019-05-27 RX ADMIN — INSULIN GLARGINE 10 UNIT(S): 100 INJECTION, SOLUTION SUBCUTANEOUS at 22:01

## 2019-05-27 RX ADMIN — Medication 650 MILLIGRAM(S): at 09:34

## 2019-05-27 RX ADMIN — CEFTRIAXONE 100 GRAM(S): 500 INJECTION, POWDER, FOR SOLUTION INTRAMUSCULAR; INTRAVENOUS at 05:44

## 2019-05-27 RX ADMIN — Medication 100 MILLIGRAM(S): at 19:31

## 2019-05-27 RX ADMIN — Medication 4: at 11:35

## 2019-05-27 RX ADMIN — APIXABAN 5 MILLIGRAM(S): 2.5 TABLET, FILM COATED ORAL at 19:31

## 2019-05-27 RX ADMIN — GABAPENTIN 300 MILLIGRAM(S): 400 CAPSULE ORAL at 13:07

## 2019-05-27 RX ADMIN — GABAPENTIN 300 MILLIGRAM(S): 400 CAPSULE ORAL at 09:35

## 2019-05-27 RX ADMIN — Medication 650 MILLIGRAM(S): at 00:00

## 2019-05-27 NOTE — PROGRESS NOTE ADULT - SUBJECTIVE AND OBJECTIVE BOX
Patient is a 78y Male seen and evaluated at bedside.   pt seen and examined  no complaints      acetaminophen   Tablet .. 650 every 6 hours PRN  apixaban 5 every 12 hours  atorvastatin 10 at bedtime  cefTRIAXone   IVPB 2 every 24 hours  docusate sodium 100 two times a day  gabapentin 300 three times a day  insulin glargine Injectable (LANTUS) 10 at bedtime  insulin lispro (HumaLOG) corrective regimen sliding scale  Before meals and at bedtime  metoprolol tartrate 25 two times a day  polyethylene glycol 3350 17 daily  senna 2 at bedtime  sertraline 100 daily      Allergies    No Known Allergies    Intolerances        T(C): , Max: 37.7 (05-26-19 @ 18:00)  T(F): , Max: 99.8 (05-26-19 @ 18:00)  HR: 91 (05-27-19 @ 13:05)  BP: 115/55 (05-27-19 @ 13:04)  BP(mean): 100 (05-27-19 @ 05:43)  RR: 18 (05-27-19 @ 13:05)  SpO2: 93% (05-27-19 @ 09:00)  Wt(kg): --    05-26 @ 07:01  -  05-27 @ 07:00  --------------------------------------------------------  IN: 150 mL / OUT: 900 mL / NET: -750 mL    05-27 @ 07:01  -  05-27 @ 14:41  --------------------------------------------------------  IN: 240 mL / OUT: 300 mL / NET: -60 mL          Review of Systems:  CONSTITUTIONAL: No fever or chills, No fatigue or tiredness.  EYES: No blurred or double vision.  RESPIRATORY: No shortness of breath, cough, hemoptysis  CARDIOVASCULAR: No Chest pain or shortness of breath  GASTROINTESTINAL: NO abdominal or flank pain, No nausea or vomiting, No diarrhea  GENITOURINARY: No dysuria or urinary burning, No difficulty passing urine, No hematuria  NEUROLOGICAL: No headaches or blurred vision  SKIN: No skin rashes   MUSCULOSKELETAL: No arthralgia, Joint pain, leg edema, No muscle pains      PHYSICAL EXAM:  GENERAL: NAD,   HEAD:  Atraumatic, Normocephalic,   EYES: Bilateral conjuctiva and sclera normal   Oral cavity: Oral mucosa dry and pink  NECK: Neck supple, No JVD  CHEST/LUNG: Clear to auscultation bilaterally;  HEART: Regular rate and rhythm. TONIA II/VI at LPSB, No gallop, no rub   ABDOMEN: Soft, Nontender, BS+nt, No flank tenderness.   EXTREMITIES: No clubbing, cyanosis, or edema  Neurology: AAOx3, no focal neurological deficit  SKIN: No rashes or lesions          LABS:                        12.2   15.11 )-----------( 284      ( 27 May 2019 05:53 )             39.1     05-27    141  |  103  |  79<H>  ----------------------------<  113<H>  3.6   |  21<L>  |  3.01<H>    Ca    9.0      27 May 2019 05:53  Phos  5.6     05-26  Mg     2.4     05-27                  RADIOLOGY & ADDITIONAL STUDIES:

## 2019-05-27 NOTE — PROGRESS NOTE ADULT - ASSESSMENT
s/p Sepsis  Bacteremia     ID follow up     A Fib    EPS to consider Ablation     Pt will need  JAROD

## 2019-05-27 NOTE — PROGRESS NOTE ADULT - PROBLEM SELECTOR PLAN 5
BP remains stable.  -150s  -holding home amlodipine 10mg and enalapril 10mg in setting of sepsis  -continue metoprolol 25mg BID  - Continue to monitor and consider resuming home meds.

## 2019-05-27 NOTE — PROGRESS NOTE ADULT - ASSESSMENT
78M with h/o IDDM, HTN and OA who was BIBA to [facility] for generalized weakness, found to have sepsis 2/2 E. coli bacteremia 2/2 UTI admitted to [facility] and found to have new onset atrial flutter, now transferred to [facility] for further management.

## 2019-05-27 NOTE — CONSULT NOTE ADULT - SUBJECTIVE AND OBJECTIVE BOX
HPI:  78M with h/o IDDM, HTN, OA BIBA to [facility] for generalized weakness since waking up this morning. He noticed having increased urinary frequency for the last two days and intermittent dysuria today. Denies hx of BPH or discomfort with voiding. Denies nasal congestion, increased sputum production or cough. Patient lives alone since his wife passed away last year, has no children. He used to work at a ware-house, currently retired. He is a former smoker, quit smoking like 15 years ago. Of note, patient was at [facility] ED four days ago after sliding off his cough and falling on his knee. X-ray showed b/l OA of the knees. Pt uses cane at baseline for ambulation. He has taking Tylenol and Motrin daily for pain control.      At [facility], vitals Tm 100.7, P95, 165/78, R18 - R22, Saturation 93%RA -> 95% on 2L NC. Pt received ceftriaxone 1g, azithromycin 500mg, Lasix 20mg iv, 3L NS, Tylenol 650mg. Pt transferred to [facility] for further care.     ROS: Has increased urinary frequency and occasional dysuria. Reports chronic back pain and b/l knee pain. Denies fever, chills, SOB, CP, palpitations, abdominal pain, n/v or diarrhea. Last BM yesterday. (22 May 2019 21:46)      PAST MEDICAL & SURGICAL HISTORY:  OA (osteoarthritis)  HTN (hypertension)  IDDM (insulin dependent diabetes mellitus)  S/P cholecystectomy  S/P appendectomy  H/O knee surgery        REVIEW OF SYSTEMS:    General:	 no weakness; no fevers, no chills  Skin/Breast: no rash  Respiratory and Thorax: no SOB, no cough  Cardiovascular:	No chest pain  Gastrointestinal:	 no nausea, vomiting , diarrhea  Genitourinary:	no dysuria, no difficulty urinating, no hematuria  Musculoskeletal:	no weakness, no joint swelling/pain  Neurological:	no focal weakness/numbness  Endocrine:	no polyuria, no polydipsia      ANTIBIOTICS:  MEDICATIONS  (STANDING):  apixaban 5 milliGRAM(s) Oral every 12 hours  atorvastatin 10 milliGRAM(s) Oral at bedtime  cefTRIAXone   IVPB 2 Gram(s) IV Intermittent every 24 hours  docusate sodium 100 milliGRAM(s) Oral two times a day  gabapentin 300 milliGRAM(s) Oral three times a day  insulin glargine Injectable (LANTUS) 10 Unit(s) SubCutaneous at bedtime  insulin lispro (HumaLOG) corrective regimen sliding scale   SubCutaneous Before meals and at bedtime  metoprolol tartrate 25 milliGRAM(s) Oral two times a day  polyethylene glycol 3350 17 Gram(s) Oral daily  senna 2 Tablet(s) Oral at bedtime  sertraline 100 milliGRAM(s) Oral daily    MEDICATIONS  (PRN):  acetaminophen   Tablet .. 650 milliGRAM(s) Oral every 6 hours PRN Temp greater or equal to 38C (100.4F), Moderate Pain (4 - 6)      Allergies    No Known Allergies    Intolerances        SOCIAL HISTORY:    FAMILY HISTORY:  Family history of cancer of spinal cord      Vital Signs Last 24 Hrs  T(C): 36.6 (27 May 2019 14:08), Max: 37.7 (26 May 2019 18:00)  T(F): 97.9 (27 May 2019 14:08), Max: 99.8 (26 May 2019 18:00)  HR: 91 (27 May 2019 13:05) (69 - 120)  BP: 115/55 (27 May 2019 13:04) (115/55 - 164/74)  BP(mean): 100 (27 May 2019 05:43) (96 - 100)  RR: 18 (27 May 2019 13:05) (18 - 18)  SpO2: 93% (27 May 2019 09:00) (92% - 95%)    PHYSICAL EXAM:  Constitutional:Well-developed, well nourished  Eyes:SASCHA, EOMI  Ear/Nose/Throat: no oral lesion, no sinus tenderness on percussion	  Neck:no JVD, no lymphadenopathy, supple  Respiratory: CTA gonzález  Cardiovascular: S1S2 RRR, no murmurs  Gastrointestinal:soft, (+) BS, no HSM  Extremities:no e/e/c  Vascular: DP Pulse:	right normal; left normal            LABS:                        12.2   15.11 )-----------( 284      ( 27 May 2019 05:53 )             39.1     05-27    141  |  103  |  79<H>  ----------------------------<  113<H>  3.6   |  21<L>  |  3.01<H>    Ca    9.0      27 May 2019 05:53  Phos  5.6     05-26  Mg     2.4     05-27            MICROBIOLOGY: Culture - Blood (05.24.19 @ 16:18)    Specimen Source: .Blood Blood    Culture Results:   No growth at 2 days.    Culture - Blood (05.23.19 @ 01:47)    Gram Stain:   Growth in aerobic bottle: Gram Negative Rods  Growth in anaerobic bottle: Gram Negative Rods    Specimen Source: .Blood Blood    Culture Results:   Growth in aerobic and anaerobic bottles: Escherichia coli  See previous culture [id number]        RADIOLOGY & ADDITIONAL STUDIES: < from: CT Abdomen and Pelvis No Cont (05.24.19 @ 12:06) >  IMPRESSION:  Minimal left hydronephrosis with perinephric stranding. Left kidney is   larger than right. No definite ureteral calculi. Findings could be due to   polynephritis or a recently passed kidney stone. Consider CT urogram with   intravenous contrast.    < end of copied text >

## 2019-05-27 NOTE — CONSULT NOTE ADULT - ASSESSMENT
79 yo male with DM p/w dysuria, fever, found to have L pyelonephritis and associated E. coli BSI. Hospital course c/b AF for which ablation procedure is planned. Bacteremia cleared and patient without signs of sepsis presently. AF ablation procedures may be associated with complications including atrio-esophageal fistula which may lead to infective endocarditis. There is no contraindication from ID perspective to proceed with AF ablation procedure at this time.   Please reconsult with ?  ID Team 1

## 2019-05-27 NOTE — PROGRESS NOTE ADULT - SUBJECTIVE AND OBJECTIVE BOX
Interventional Cardiology PA Adult Progress Note    Subjective Assessment: Patient seen and examined at bedside, states he is very weak, continues to have b/l leg pain constant, denies chest pain, SOB, or palpitations  ROS negative except per HPI and subjective  	  MEDICATIONS:  metoprolol tartrate 25 milliGRAM(s) Oral two times a day  cefTRIAXone   IVPB 2 Gram(s) IV Intermittent every 24 hours  acetaminophen   Tablet .. 650 milliGRAM(s) Oral every 6 hours PRN  gabapentin 300 milliGRAM(s) Oral three times a day  sertraline 100 milliGRAM(s) Oral daily  docusate sodium 100 milliGRAM(s) Oral two times a day  polyethylene glycol 3350 17 Gram(s) Oral daily  senna 2 Tablet(s) Oral at bedtime  atorvastatin 10 milliGRAM(s) Oral at bedtime  insulin glargine Injectable (LANTUS) 10 Unit(s) SubCutaneous at bedtime  insulin lispro (HumaLOG) corrective regimen sliding scale   SubCutaneous Before meals and at bedtime  apixaban 2.5 milliGRAM(s) Oral every 12 hours  potassium chloride   Powder 20 milliEquivalent(s) Oral once	    [PHYSICAL EXAM:  TELEMETRY:  T(C): 36.6 (05-27-19 @ 05:36), Max: 37.7 (05-26-19 @ 18:00)  HR: 120 (05-27-19 @ 05:43) (69 - 120)  BP: 142/72 (05-27-19 @ 05:43) (125/76 - 161/90)  RR: 18 (05-27-19 @ 05:43) (18 - 18)  SpO2: 92% (05-27-19 @ 05:43) (92% - 95%)    I&O's Summary    26 May 2019 07:01  -  27 May 2019 07:00  --------------------------------------------------------  IN: 150 mL / OUT: 900 mL / NET: -750 mL                                        Appearance: Normal	  HEENT:   Normal oral mucosa, PERRL, EOMI	  Neck: Supple,  - JVD; Carotid Bruit   Cardiovascular: Normal S1 S2, No JVD, No murmurs,   Respiratory: Lungs clear to auscultation//No Rales, Rhonchi, Wheezing	  Gastrointestinal:  Soft, Non-tender, + BS	  Skin: No rashes, No ecchymoses, No cyanosis  Extremities: Normal range of motion, No clubbing, cyanosis or edema  Vascular: Peripheral pulses palpable 2+ bilaterally  Neurologic: Non-focal  Psychiatry: A & O x 3, Mood & affect appropriate  	    LABS:	 	  CARDIAC MARKERS:                          12.2   15.11 )-----------( 284      ( 27 May 2019 05:53 )             39.1     05-27    141  |  103  |  79<H>  ----------------------------<  113<H>  3.6   |  21<L>  |  3.01<H>    Ca    9.0      27 May 2019 05:53  Phos  5.6     05-26  Mg     2.4     05-27    PT/INR - ( 25 May 2019 08:58 )   PT: 15.7 sec;   INR: 1.38     PTT - ( 25 May 2019 08:58 )  PTT:30.1 sec    ASSESSMENT/PLAN:

## 2019-05-27 NOTE — PROGRESS NOTE ADULT - PROBLEM SELECTOR PLAN 3
MANAN on CKD now improving. (baseline Crt 1.8-1.9, diabetic nephropathy)  - Renal service following, f/u recs  - Cr 3.67 on arrival s/p 3L IVF at [facility]  - Crt remains stable today 79/3.01  - FeUrea 49.6 c/w intrinsic renal injury   - Hold home Enalapril and Lasix  - retroperitoneal US: B/L renal cysts measuring up to 5.5 cm on the right and 5.1cm on the left, negative for hydronephrosis

## 2019-05-27 NOTE — PROGRESS NOTE ADULT - PROBLEM SELECTOR PLAN 4
Pt denies hx of HF but on Furosemide 80mg QD at home. On admission BNP 3296 (received fluid while @ [facility]).   - remains euvolemic on exam. denies SOB, no edema.   - CXR on admission: Mild pulmonary venous congestion  - Echo: moderate global hypokinesis, concentric LVH, EF 35%  - Holding Lasix and lisinopril for now given MANAN.

## 2019-05-27 NOTE — PROGRESS NOTE ADULT - PROBLEM SELECTOR PLAN 2
New Aflutter.  HR improves to 90s, on Tele can bump to 120s.   - Continue lopressor to 25mg BID   - continue Eliquis 2.5mg BID  - Consult EP on Tuesday for further management. New Aflutter.  HR improves to 90s, on Tele can bump to 120s.   - Continue lopressor to 25mg BID   - started on Eliquis 2.5mg BID but does not meet two criteria so increased to Eliquis 5mg Po BID.   - EP service consulted.  To consider ablation possible Wednesday, Will need JAROD prior to ablation.  ID service consulted for Clearance prior to procedure.  f/u further EP Recs.

## 2019-05-27 NOTE — PROGRESS NOTE ADULT - PROBLEM SELECTOR PLAN 6
Trop 0.07 --> 0.08, asymptomatic, EKG with no acute ST/T-wave changes. Likely secondary to demand ischemia in setting of sepsis/bacteremia  - hx of non obstructive, follows up with Dr. Neil  - continue metoprolol 25mg BID and Atorvastatin 10mg (pravastatin @ home)

## 2019-05-27 NOTE — PROGRESS NOTE ADULT - PROBLEM SELECTOR PLAN 1
Sepsis secondary to urinary tract infection, now resolved  - Continue leukocytosis 14.90, afebrile.    - urinary tract infection c/b gram negative bacteremia.  - CXR without focal consolidation.   - Initial blood cultures growing E. coli susceptible to Ceftriaxone, Surveillance blood Cx negative to date.   - UCx NGTD however this was on antibiotics.   - CT abd w/o contrast w/ minimal hydronephrosis w/ perinephric stranding, findings with possible pyelonephritis or recently passed kidney stone.  - US retroperitoneal negative for hydronephrosis  - continue CTX 2g q24h for coverage of bacteremia, will require two week course of appropriate antibiotic therapy from last set of negative cultures (through 6/7)  - per renal holding oxybutynin as can increase risk of UTI. Sepsis secondary to urinary tract infection, now resolved  - Continue leukocytosis 14.90, afebrile.    - urinary tract infection c/b gram negative bacteremia.  - CXR without focal consolidation.   - Initial blood cultures growing E. coli susceptible to Ceftriaxone, Surveillance blood Cx negative to date.   - UCx NGTD however this was on antibiotics.   - CT abd w/o contrast w/ minimal hydronephrosis w/ perinephric stranding, findings with possible pyelonephritis or recently passed kidney stone.  - US retroperitoneal negative for hydronephrosis  - continue CTX 2g q24h for coverage of bacteremia, will require two week course of appropriate antibiotic therapy from last set of negative cultures (through 6/7)  - per renal holding oxybutynin as can increase risk of UTI.  - ID service consulted for ID Clearance prior JAROD/Ablation per EP request.

## 2019-05-27 NOTE — PROGRESS NOTE ADULT - SUBJECTIVE AND OBJECTIVE BOX
Heart rate control slightly better           PAST MEDICAL & SURGICAL HISTORY:  OA (osteoarthritis)  HTN (hypertension)  IDDM (insulin dependent diabetes mellitus)  S/P cholecystectomy  S/P appendectomy  H/O knee surgery    MEDICATIONS  (STANDING):  apixaban 2.5 milliGRAM(s) Oral every 12 hours  atorvastatin 10 milliGRAM(s) Oral at bedtime  cefTRIAXone   IVPB 2 Gram(s) IV Intermittent every 24 hours  docusate sodium 100 milliGRAM(s) Oral two times a day  gabapentin 300 milliGRAM(s) Oral three times a day  insulin glargine Injectable (LANTUS) 10 Unit(s) SubCutaneous at bedtime  insulin lispro (HumaLOG) corrective regimen sliding scale   SubCutaneous Before meals and at bedtime  metoprolol tartrate 25 milliGRAM(s) Oral two times a day  polyethylene glycol 3350 17 Gram(s) Oral daily  senna 2 Tablet(s) Oral at bedtime  sertraline 100 milliGRAM(s) Oral daily    MEDICATIONS  (PRN):  acetaminophen   Tablet .. 650 milliGRAM(s) Oral every 6 hours PRN Temp greater or equal to 38C (100.4F), Moderate Pain (4 - 6)    ICU Vital Signs Last 24 Hrs  T(C): 36.4 (27 May 2019 10:22), Max: 37.7 (26 May 2019 18:00)  T(F): 97.5 (27 May 2019 10:22), Max: 99.8 (26 May 2019 18:00)  HR: 97 (27 May 2019 09:00) (69 - 120)  BP: 164/74 (27 May 2019 09:00) (125/76 - 164/74)  BP(mean): 100 (27 May 2019 05:43) (96 - 100)  ABP: --  ABP(mean): --  RR: 18 (27 May 2019 09:00) (18 - 18)  SpO2: 93% (27 May 2019 09:00) (92% - 95%)    lungs clear  CXR   neg     CV  s1 s2  abd soft  Ext stable    echo    EF  35 %  Cardiomyopathy     EKG  A Flutter   variable block                           12.2   15.11 )-----------( 284      ( 27 May 2019 05:53 )             39.1   05-27    141  |  103  |  79<H>  ----------------------------<  113<H>  3.6   |  21<L>  |  3.01<H>    Ca    9.0      27 May 2019 05:53  Phos  5.6     05-26  Mg     2.4     05-27    Culture - Blood (05.23.19 @ 01:47)    Gram Stain:   Growth in aerobic bottle: Gram Negative Rods  Growth in anaerobic bottle: Gram Negative Rods    -  Escherichia coli: Detec    -  Amikacin: S <=8    -  Amoxicillin/Clavulanic Acid: S <=8/4    -  Ampicillin: R >16 These ampicillin results predict results for amoxicillin    -  Ampicillin/Sulbactam: I 16/8    -  Aztreonam: S <=4    -  Cefazolin: S <=2    -  Cefepime: S <=2    -  Cefotaxime: S <=2    -  Cefoxitin: S <=4    -  Ceftazidime: S <=1    -  Ceftriaxone: S <=1 Enterobacter, Citrobacter, and Serratia may develop resistance during prolonged therapy    -  Cefuroxime: S <=4    -  Ciprofloxacin: S <=0.5    -  Ertapenem: S <=0.5    -  Gentamicin: S 2    -  Imipenem: S <=1    -  Levofloxacin: S <=1    -  Meropenem: S <=1    -  Moxifloxacin(Aerobic): S <=2    -  Piperacillin/Tazobactam: S <=8    -  Tetra/Doxy: R >8    -  Tigecycline: S <=1    -  Tobramycin: S <=2    -  Trimethoprim/Sulfamethoxazole: R >2/38    Specimen Source: .Blood Blood    Organism: Blood Culture PCR    Organism: Escherichia coli    Culture Results:   Growth in aerobic and anaerobic bottles: Escherichia coli  "Due to technical problems, Proteus sp. will Not be reported as part of  the BCID panel until further notice"  ***Blood Panel PCR results on this specimen are available  approximately 3 hours after the Gram stain result.***  Gram stain, PCR, and/or culture results may not always  correspond due to difference in methodologies.  ************************************************************  This PCR assay was performed using [employer].  The following targets are tested for: Enterococcus,  vancomycin resistant enterococci, Listeria monocytogenes,  coagulase negative staphylococci, S. aureus,  methicillin resistant S. aureus, Streptococcus agalactiae  (Group B), S. pneumoniae, S.pyogenes (Group A),  Acinetobacter baumannii, Enterobacter cloacae, E. coli,  Klebsiella oxytoca, K. pneumoniae, Proteus sp.,  Serratia marcescens, Haemophilus influenzae,  Neisseria meningitidis, Pseudomonas aeruginosa, Candida  albicans, C. glabrata, C krusei, C parapsilosis,  C. tropicalis and the KPC resistance gene.    Organism Identification: Blood Culture PCR  Escherichia coli    Method Type: PCR    Method Type: TIMOTHY

## 2019-05-27 NOTE — PROGRESS NOTE ADULT - ASSESSMENT
78M with h/o IDDM, HTN, OA BIBA to [facility] for generalized weakness, found to have sepsis 2/2 UTI, now with E.coli bacteremia, being treated with Ceftriaxone (started 5/22 ).    Martin vs CKD  unknown baseline cr  DDX: ATN   urine lytes--> urine urea @ 266,  urine na @ 90--> feurea @ 49.6 %--> intrinsic   will double check with pcp  cr stable @ 3.01  volume status--> euvolemic  UA on 5/22---> protein and blood==> please repeat UA  recheck UA  renally dose abx  renal diet      proteinuria  please check urine protein/cr ratio    # HTN (hypertension).   on metoprolol 25 mg bID  consider adding amlodipine 5 mg if SBP consistently > 160      #Microcytic Anemia -   TORREY=> transf sat @ 4 %--> recommend feosol 325 mg po daily    renal bone disease  check pth, vit D 1,25, vit D 25

## 2019-05-27 NOTE — PROGRESS NOTE ADULT - PROBLEM SELECTOR PLAN 7
A1c 7.3  -continue Lantus 10 units qhs and ISS  - Patient with c/o persistent b/l leg pain from knee to feet, pain of feet with standing. c/w possible neuropathy.  Home Gabapentin increased to 300mg TID.  Continue to monitor.    VTE ppx: on Eliquis  Dispo: Continue IV Abx for Bacteremia.  Continue management of Aflutter.  EP consult. PT Recs ALEYDA but patient refusing. A1c 7.3  -continue Lantus 10 units qhs and ISS  - Patient with c/o persistent b/l leg pain from knee to feet, pain of feet with standing. c/w possible neuropathy.  Home Gabapentin increased to 300mg TID.  Continue to monitor.    VTE ppx: on Eliquis  Dispo: Continue IV Abx for Bacteremia.  EP consulted for Aflutter management.  PT Recs ALEYDA but patient refusing.

## 2019-05-27 NOTE — PROGRESS NOTE ADULT - SUBJECTIVE AND OBJECTIVE BOX
CHIEF COMPLAINT:  Atrial flutter    HISTORY OF PRESENT ILLNESS:    Patient is a 77yo M with a PMHx of DM, HTN, and OA who was BIBA to [facility] for generalized weakness, increased urinary frequency and intermittent dysuria. Patient found to have sepsis secondary to E. coli bacteremia 2/2 UTI on admission and subsequently transferred to [facility], which is now resolving. Hospital course c/b the development of new onset atrial flutter and globally depressed EF, likely due to the concomitant sepsis. Patient started on Ceftriaxone 2g Q24H with resolution of sepsis and clearance of bacteremia. Started on anticoagulation and beta blockade for atrial flutter.  Patient currently denies chest pain, SOB, palpitations, dizziness, LE edema, orthopnea, PND, and syncope.  No history of atrial fibrillation or flutter.      PAST MEDICAL & SURGICAL HISTORY:  OA (osteoarthritis)  HTN (hypertension)  IDDM (insulin dependent diabetes mellitus)  S/P cholecystectomy  S/P appendectomy  H/O knee surgery        MEDICATIONS:  metoprolol tartrate 25 milliGRAM(s) Oral two times a day  cefTRIAXone   IVPB 2 Gram(s) IV Intermittent every 24 hours  acetaminophen   Tablet .. 650 milliGRAM(s) Oral every 6 hours PRN  gabapentin 300 milliGRAM(s) Oral three times a day  sertraline 100 milliGRAM(s) Oral daily  docusate sodium 100 milliGRAM(s) Oral two times a day  polyethylene glycol 3350 17 Gram(s) Oral daily  senna 2 Tablet(s) Oral at bedtime  atorvastatin 10 milliGRAM(s) Oral at bedtime  insulin glargine Injectable (LANTUS) 10 Unit(s) SubCutaneous at bedtime  insulin lispro (HumaLOG) corrective regimen sliding scale   SubCutaneous Before meals and at bedtime  apixaban 5 milliGRAM(s) Oral every 12 hours        ALLERGIES:  No Known Allergies    SOCIAL HISTORY:    Denies illicit drug use  Denies smoking history  Denies excessive alcohol intake        REVIEW OF SYSTEMS:    CONSTITUTIONAL: No fever, weight loss, or fatigue  EYES: No eye pain, visual disturbances, or discharge  ENMT:  No difficulty hearing, tinnitus, vertigo; No sinus or throat pain  NECK: No pain or stiffness  BREASTS: No pain, masses, or nipple discharge  RESPIRATORY: No cough, wheezing, chills or hemoptysis; No Shortness of Breath  CARDIOVASCULAR: No chest pain, palpitations, dizziness, or leg swelling  GASTROINTESTINAL: No abdominal or epigastric pain. No nausea, vomiting, or hematemesis; No diarrhea or constipation. No melena or hematochezia.  GENITOURINARY: No dysuria, frequency, hematuria, or incontinence  NEUROLOGICAL: No headaches, memory loss, loss of strength, numbness, or tremors  SKIN: No itching, burning, rashes, or lesions   LYMPH Nodes: No enlarged glands  ENDOCRINE: No heat or cold intolerance; No hair loss  MUSCULOSKELETAL: No joint pain or swelling; No muscle, back, or extremity pain  PSYCHIATRIC: No depression, anxiety, mood swings, or difficulty sleeping  HEME/LYMPH: No easy bruising, or bleeding gums  ALLERY AND IMMUNOLOGIC: No hives or eczema	      PHYSICAL EXAM:  T(C): 36.7 (05-27-19 @ 21:48), Max: 37.7 (05-27-19 @ 00:03)  HR: 95 (05-27-19 @ 20:50) (69 - 120)  BP: 145/70 (05-27-19 @ 20:50) (115/55 - 164/74)  RR: 18 (05-27-19 @ 20:50) (18 - 18)  SpO2: 91% (05-27-19 @ 20:50) (91% - 95%)  Wt(kg): --    Appearance: Normal	  HEENT:   Normal oral mucosa, PERRL, EOMI	  Cardiovascular: Normal S1 S2, No JVD, No murmurs, No edema  Respiratory: Lungs clear to auscultation	  Gastrointestinal:  Soft, Non-tender, + BS	  Neurologic: A&O x 3, Non-focal  Extremities: Normal range of motion, No clubbing, cyanosis or edema  Vascular: Peripheral pulses palpable 2+ bilaterally    	  LABS:	 	    CARDIAC MARKERS:                                  12.2   15.11 )-----------( 284      ( 27 May 2019 05:53 )             39.1     05-27    141  |  103  |  79<H>  ----------------------------<  113<H>  3.6   |  21<L>  |  3.01<H>    Ca    9.0      27 May 2019 05:53  Phos  5.6     05-26  Mg     2.4     05-27          PERTINENT DIAGNOSTIC TESTING:    Echocardiogram:  EF 35%, LVH, normal LA/RA  Telemetry:  Atrial flutter in 70's  EKG:  atrial flutter at 72bpm, normal axis/intervals        ASSESSMENT/PLAN: 	  Patient is a 77yo M with a PMHx of DM, HTN, and OA admitted with urosepsis complicated by atrial flutter and new-onset systolic heart failure.  I suspect that patient's cardiomyopathy is likely secondary to tachycardia and recent episode of sepsis given recent negative ischemic workup.  As such, discussed merits of DC cardioversion and ablation.  After extensive discussion regarding risks and benefits, patient would like to proceed with EP study and ablation.  -maintain metoprolol 25mg twice daily  -maintain Eliquis for stroke prophylaxis  -ID consult to clear for EP study and ablation  -will proceed with ablation on Wednesday

## 2019-05-28 LAB
ALBUMIN SERPL ELPH-MCNC: 2.8 G/DL — LOW (ref 3.3–5)
ALP SERPL-CCNC: 112 U/L — SIGNIFICANT CHANGE UP (ref 40–120)
ALT FLD-CCNC: 27 U/L — SIGNIFICANT CHANGE UP (ref 10–45)
ANION GAP SERPL CALC-SCNC: 16 MMOL/L — SIGNIFICANT CHANGE UP (ref 5–17)
AST SERPL-CCNC: 21 U/L — SIGNIFICANT CHANGE UP (ref 10–40)
BILIRUB SERPL-MCNC: 0.2 MG/DL — SIGNIFICANT CHANGE UP (ref 0.2–1.2)
BUN SERPL-MCNC: 75 MG/DL — HIGH (ref 7–23)
CALCIUM SERPL-MCNC: 9.1 MG/DL — SIGNIFICANT CHANGE UP (ref 8.4–10.5)
CHLORIDE SERPL-SCNC: 105 MMOL/L — SIGNIFICANT CHANGE UP (ref 96–108)
CO2 SERPL-SCNC: 19 MMOL/L — LOW (ref 22–31)
CREAT SERPL-MCNC: 2.86 MG/DL — HIGH (ref 0.5–1.3)
GLUCOSE BLDC GLUCOMTR-MCNC: 144 MG/DL — HIGH (ref 70–99)
GLUCOSE BLDC GLUCOMTR-MCNC: 185 MG/DL — HIGH (ref 70–99)
GLUCOSE BLDC GLUCOMTR-MCNC: 195 MG/DL — HIGH (ref 70–99)
GLUCOSE BLDC GLUCOMTR-MCNC: 203 MG/DL — HIGH (ref 70–99)
GLUCOSE BLDC GLUCOMTR-MCNC: 223 MG/DL — HIGH (ref 70–99)
GLUCOSE SERPL-MCNC: 151 MG/DL — HIGH (ref 70–99)
HCT VFR BLD CALC: 39.1 % — SIGNIFICANT CHANGE UP (ref 39–50)
HGB BLD-MCNC: 12 G/DL — LOW (ref 13–17)
MAGNESIUM SERPL-MCNC: 2.5 MG/DL — SIGNIFICANT CHANGE UP (ref 1.6–2.6)
MCHC RBC-ENTMCNC: 25.3 PG — LOW (ref 27–34)
MCHC RBC-ENTMCNC: 30.7 GM/DL — LOW (ref 32–36)
MCV RBC AUTO: 82.3 FL — SIGNIFICANT CHANGE UP (ref 80–100)
NRBC # BLD: 0 /100 WBCS — SIGNIFICANT CHANGE UP (ref 0–0)
PLATELET # BLD AUTO: 331 K/UL — SIGNIFICANT CHANGE UP (ref 150–400)
POTASSIUM SERPL-MCNC: 4.1 MMOL/L — SIGNIFICANT CHANGE UP (ref 3.5–5.3)
POTASSIUM SERPL-SCNC: 4.1 MMOL/L — SIGNIFICANT CHANGE UP (ref 3.5–5.3)
PROT SERPL-MCNC: 7.3 G/DL — SIGNIFICANT CHANGE UP (ref 6–8.3)
RBC # BLD: 4.75 M/UL — SIGNIFICANT CHANGE UP (ref 4.2–5.8)
RBC # FLD: 15.2 % — HIGH (ref 10.3–14.5)
SODIUM SERPL-SCNC: 140 MMOL/L — SIGNIFICANT CHANGE UP (ref 135–145)
WBC # BLD: 14.2 K/UL — HIGH (ref 3.8–10.5)
WBC # FLD AUTO: 14.2 K/UL — HIGH (ref 3.8–10.5)

## 2019-05-28 PROCEDURE — 99233 SBSQ HOSP IP/OBS HIGH 50: CPT | Mod: GC

## 2019-05-28 RX ADMIN — Medication 25 MILLIGRAM(S): at 06:43

## 2019-05-28 RX ADMIN — GABAPENTIN 300 MILLIGRAM(S): 400 CAPSULE ORAL at 13:58

## 2019-05-28 RX ADMIN — CEFTRIAXONE 100 GRAM(S): 500 INJECTION, POWDER, FOR SOLUTION INTRAMUSCULAR; INTRAVENOUS at 06:43

## 2019-05-28 RX ADMIN — GABAPENTIN 300 MILLIGRAM(S): 400 CAPSULE ORAL at 06:43

## 2019-05-28 RX ADMIN — APIXABAN 5 MILLIGRAM(S): 2.5 TABLET, FILM COATED ORAL at 06:43

## 2019-05-28 RX ADMIN — Medication 650 MILLIGRAM(S): at 10:26

## 2019-05-28 RX ADMIN — APIXABAN 5 MILLIGRAM(S): 2.5 TABLET, FILM COATED ORAL at 17:39

## 2019-05-28 RX ADMIN — Medication 25 MILLIGRAM(S): at 17:38

## 2019-05-28 RX ADMIN — INSULIN GLARGINE 10 UNIT(S): 100 INJECTION, SOLUTION SUBCUTANEOUS at 22:06

## 2019-05-28 RX ADMIN — GABAPENTIN 300 MILLIGRAM(S): 400 CAPSULE ORAL at 22:05

## 2019-05-28 RX ADMIN — Medication 2: at 22:05

## 2019-05-28 RX ADMIN — SERTRALINE 100 MILLIGRAM(S): 25 TABLET, FILM COATED ORAL at 13:58

## 2019-05-28 RX ADMIN — Medication 4: at 16:52

## 2019-05-28 RX ADMIN — Medication 2: at 12:44

## 2019-05-28 RX ADMIN — Medication 650 MILLIGRAM(S): at 01:00

## 2019-05-28 RX ADMIN — Medication 650 MILLIGRAM(S): at 05:15

## 2019-05-28 RX ADMIN — Medication 0: at 07:10

## 2019-05-28 RX ADMIN — ATORVASTATIN CALCIUM 10 MILLIGRAM(S): 80 TABLET, FILM COATED ORAL at 22:05

## 2019-05-28 RX ADMIN — SENNA PLUS 2 TABLET(S): 8.6 TABLET ORAL at 22:05

## 2019-05-28 RX ADMIN — Medication 650 MILLIGRAM(S): at 04:46

## 2019-05-28 RX ADMIN — Medication 100 MILLIGRAM(S): at 17:38

## 2019-05-28 NOTE — PROGRESS NOTE ADULT - PROBLEM SELECTOR PLAN 1
Sepsis secondary to urinary tract infection, now resolved  - Continue leukocytosis 14.90, afebrile.    - urinary tract infection c/b gram negative bacteremia.  - CXR without focal consolidation.   - Initial blood cultures growing E. coli susceptible to Ceftriaxone, Surveillance blood Cx negative to date.   - UCx NGTD however this was on antibiotics.   - CT abd w/o contrast w/ minimal hydronephrosis w/ perinephric stranding, findings with possible pyelonephritis or recently passed kidney stone.  - US retroperitoneal negative for hydronephrosis  - continue CTX 2g q24h for coverage of bacteremia, will require two week course of appropriate antibiotic therapy from last set of negative cultures (through 6/7)  - per renal holding oxybutynin as can increase risk of UTI.  - ID service consulted for ID Clearance prior JAROD/Ablation per EP request. Sepsis secondary to urinary tract infection, now resolved  - Continue leukocytosis 14.90, afebrile.    - urinary tract infection c/b gram negative bacteremia.  - CXR without focal consolidation.   - Initial blood cultures growing E. coli susceptible to Ceftriaxone, Surveillance blood Cx negative to date.   - UCx NGTD however this was on antibiotics.   - CT abd w/o contrast w/ minimal hydronephrosis w/ perinephric stranding, findings with possible pyelonephritis or recently passed kidney stone.  - US retroperitoneal negative for hydronephrosis  - continue CTX 2g q24h for coverage of bacteremia, will require two week course of appropriate antibiotic therapy from last set of negative cultures (through 6/7)  - per renal holding oxybutynin as can increase risk of UTI.  - ID service consulted and gave clearance for JAROD/ablation

## 2019-05-28 NOTE — PROGRESS NOTE ADULT - ASSESSMENT
79yo M with a PMHx of DM, HTN, and OA admitted with dysuria, fever, found to have L pyelonephritis and associated E. coli BSI.  Hospital course complicated by typical aflutter and new-onset systolic heart failure. Echo with EF 35%.  I suspect that patient's cardiomyopathy is likely secondary to tachycardia and recent episode of sepsis given recent negative ischemic workup.  As such, discussed merits of DC cardioversion and ablation.  After extensive discussion regarding risks and benefits, patient would like to proceed with EP study and ablation.  -maintain metoprolol 25mg twice daily  -maintain Eliquis for stroke prophylaxis  -ID cleared for EP study and ablation of CTI aflutter.   -NPO for procedure tomorrow. Risks and benefits were explained to the pt. Consent in chart.

## 2019-05-28 NOTE — PROGRESS NOTE ADULT - SUBJECTIVE AND OBJECTIVE BOX
EPS Progress Note  CC: urosepsis    S: No overnight event or complaints.   TELE: still in typical aflutter VR  bpm.     O: T(C): 36.7 (05-28-19 @ 13:59), Max: 36.8 (05-27-19 @ 18:26)  HR: 130 (05-28-19 @ 16:56) (90 - 130)  BP: 136/64 (05-28-19 @ 16:56) (118/68 - 161/70)  RR: 20 (05-28-19 @ 16:56) (18 - 20)  SpO2: 91% (05-28-19 @ 13:58) (91% - 95%)      PHYSICAL  Constitutional:  NAD        Neck: No JVD  Pulm:  CTA B/L, no wheeze or rale   Cardiac:   + s1/s2, irregular.   GI:  +BS , soft ND/NT  Vascular: No LE edema, pulse 2+  Neuro: AAO x 3. no focal deficit  Skin: Warm. No rash or lesion     LABS:                        12.0   14.20 )-----------( 331      ( 28 May 2019 06:34 )             39.1     05-28    140  |  105  |  75<H>  ----------------------------<  151<H>  4.1   |  19<L>  |  2.86<H>    Ca    9.1      28 May 2019 06:34  Mg     2.5     05-28    TPro  7.3  /  Alb  2.8<L>  /  TBili  0.2  /  DBili  x   /  AST  21  /  ALT  27  /  AlkPhos  112  05-28        MEDICATIONS:  acetaminophen   Tablet .. 650 milliGRAM(s) Oral every 6 hours PRN  apixaban 5 milliGRAM(s) Oral every 12 hours  atorvastatin 10 milliGRAM(s) Oral at bedtime  cefTRIAXone   IVPB 2 Gram(s) IV Intermittent every 24 hours  docusate sodium 100 milliGRAM(s) Oral two times a day  gabapentin 300 milliGRAM(s) Oral three times a day  insulin glargine Injectable (LANTUS) 10 Unit(s) SubCutaneous at bedtime  insulin lispro (HumaLOG) corrective regimen sliding scale   SubCutaneous Before meals and at bedtime  metoprolol tartrate 25 milliGRAM(s) Oral two times a day  polyethylene glycol 3350 17 Gram(s) Oral daily  senna 2 Tablet(s) Oral at bedtime  sertraline 100 milliGRAM(s) Oral daily

## 2019-05-28 NOTE — PROGRESS NOTE ADULT - PROBLEM SELECTOR PLAN 4
Pt denies hx of HF but on Furosemide 80mg QD at home. On admission BNP 3296 (received fluid while @ [facility]).   - remains euvolemic on exam. denies SOB, no edema.   - CXR on admission: Mild pulmonary venous congestion  - Echo: moderate global hypokinesis, concentric LVH, EF 35%  - Holding Lasix and lisinopril for now given MANAN.

## 2019-05-28 NOTE — PROGRESS NOTE ADULT - SUBJECTIVE AND OBJECTIVE BOX
Pt is scheduled for cardiac ablation on Wednesday    re A Flutter     s/p E Coli Bacteremia   L Pyelonephritis   Bacteremia is improved /cleared    ID clearance appreciated    PAST MEDICAL & SURGICAL HISTORY:  OA (osteoarthritis)  HTN (hypertension)  IDDM (insulin dependent diabetes mellitus)  S/P cholecystectomy  S/P appendectomy  H/O knee surgery    MEDICATIONS  (STANDING):  apixaban 5 milliGRAM(s) Oral every 12 hours  atorvastatin 10 milliGRAM(s) Oral at bedtime  cefTRIAXone   IVPB 2 Gram(s) IV Intermittent every 24 hours  docusate sodium 100 milliGRAM(s) Oral two times a day  gabapentin 300 milliGRAM(s) Oral three times a day  insulin glargine Injectable (LANTUS) 10 Unit(s) SubCutaneous at bedtime  insulin lispro (HumaLOG) corrective regimen sliding scale   SubCutaneous Before meals and at bedtime  metoprolol tartrate 25 milliGRAM(s) Oral two times a day  polyethylene glycol 3350 17 Gram(s) Oral daily  senna 2 Tablet(s) Oral at bedtime  sertraline 100 milliGRAM(s) Oral daily    MEDICATIONS  (PRN):  acetaminophen   Tablet .. 650 milliGRAM(s) Oral every 6 hours PRN Temp greater or equal to 38C (100.4F), Moderate Pain (4 - 6)      ICU Vital Signs Last 24 Hrs  T(C): 36.6 (28 May 2019 08:44), Max: 36.8 (27 May 2019 18:26)  T(F): 97.9 (28 May 2019 08:44), Max: 98.2 (27 May 2019 18:26)  HR: 92 (28 May 2019 05:50) (90 - 102)  BP: 125/66 (28 May 2019 05:50) (115/55 - 145/70)  BP(mean): 90 (28 May 2019 05:50) (90 - 90)  ABP: --  ABP(mean): --  RR: 18 (28 May 2019 05:50) (18 - 18)  SpO2: 94% (28 May 2019 05:50) (91% - 94%)      Lungs clear  CV irreg   abd soft  ext stable                          12.0   14.20 )-----------( 331      ( 28 May 2019 06:34 )             39.1   05-28    140  |  105  |  75<H>  ----------------------------<  151<H>  4.1   |  19<L>  |  2.86<H>    Ca    9.1      28 May 2019 06:34  Mg     2.5     05-28    TPro  7.3  /  Alb  2.8<L>  /  TBili  0.2  /  DBili  x   /  AST  21  /  ALT  27  /  AlkPhos  112  05-28  Culture - Blood (05.24.19 @ 16:18)    Specimen Source: .Blood Blood    Culture Results:   No growth at 3 days.        Culture Results:   No growth at 3 days. (05.24.19 @ 16:09)                          12.0   14.20 )-----------( 331      ( 28 May 2019 06:34 )             39.1   05-28    140  |  105  |  75<H>  ----------------------------<  151<H>  4.1   |  19<L>  |  2.86<H>    Ca    9.1      28 May 2019 06:34  Mg     2.5     05-28    TPro  7.3  /  Alb  2.8<L>  /  TBili  0.2  /  DBili  x   /  AST  21  /  ALT  27  /  AlkPhos  112  05-28  Echo     EF 35 %   Moderate global Hypokinesis

## 2019-05-28 NOTE — PROGRESS NOTE ADULT - SUBJECTIVE AND OBJECTIVE BOX
Seen in the morning in his bed, no shortness of breath, no chest pain, no fever    The renal follows the case for MANAN on CKD ?? - not well documented previous renal function   Now treated for UTI     Patient seen and examined at bedside.     acetaminophen   Tablet .. 650 milliGRAM(s) every 6 hours PRN  apixaban 5 milliGRAM(s) every 12 hours  atorvastatin 10 milliGRAM(s) at bedtime  cefTRIAXone   IVPB 2 Gram(s) every 24 hours  docusate sodium 100 milliGRAM(s) two times a day  gabapentin 300 milliGRAM(s) three times a day  insulin glargine Injectable (LANTUS) 10 Unit(s) at bedtime  insulin lispro (HumaLOG) corrective regimen sliding scale   Before meals and at bedtime  metoprolol tartrate 25 milliGRAM(s) two times a day  polyethylene glycol 3350 17 Gram(s) daily  senna 2 Tablet(s) at bedtime  sertraline 100 milliGRAM(s) daily      Allergies    No Known Allergies    Intolerances        T(C): , Max: 36.8 (05-27-19 @ 18:26)  T(F): , Max: 98.2 (05-27-19 @ 18:26)  HR: 96 (05-28-19 @ 10:30)  BP: 152/72 (05-28-19 @ 10:30)  BP(mean): 90 (05-28-19 @ 05:50)  RR: 20 (05-28-19 @ 08:35)  SpO2: 95% (05-28-19 @ 10:30)  Wt(kg): --    05-27 @ 07:01  -  05-28 @ 07:00  --------------------------------------------------------  IN:    Oral Fluid: 240 mL  Total IN: 240 mL    OUT:    Voided: 750 mL  Total OUT: 750 mL    Total NET: -510 mL      05-28 @ 07:01  -  05-28 @ 11:47  --------------------------------------------------------  IN:    Oral Fluid: 120 mL  Total IN: 120 mL    OUT:    Voided: 225 mL  Total OUT: 225 mL    Total NET: -105 mL    Physical exam:   Alert and oriented   No JVD   Normal air entry into the lungs, no wheezing, no crackles   RRR, normal s1/s2, no murmurs, rubs or gallops   Abdomen - soft, not tender, not distended   Extremities: no edema             LABS:                        12.0   14.20 )-----------( 331      ( 28 May 2019 06:34 )             39.1     05-28    140  |  105  |  75<H>  ----------------------------<  151<H>  4.1   |  19<L>  |  2.86<H>    Ca    9.1      28 May 2019 06:34  Mg     2.5     05-28    TPro  7.3  /  Alb  2.8<L>  /  TBili  0.2  /  DBili  x   /  AST  21  /  ALT  27  /  AlkPhos  112  05-28                RADIOLOGY & ADDITIONAL STUDIES:

## 2019-05-28 NOTE — PROGRESS NOTE ADULT - SUBJECTIVE AND OBJECTIVE BOX
See nephrology fellow's note. See today's note and consult note. I independently performed the key portions of the evaluation and management service provided. I agree with the above history, physical, and plan which I have reviewed and edited where appropriate. I find MANAN, HTN, DM. Follow SCr. Continue anti-hypertensives.  See full note.

## 2019-05-28 NOTE — PROGRESS NOTE ADULT - PROBLEM SELECTOR PLAN 7
A1c 7.3  -continue Lantus 10 units qhs and ISS  - Patient with c/o persistent b/l leg pain from knee to feet, pain of feet with standing. c/w possible neuropathy.  Home Gabapentin increased to 300mg TID.  Continue to monitor.    VTE ppx: on Eliquis  Dispo: Continue IV Abx for Bacteremia.  EP consulted for Aflutter management.  PT Recs ALEYDA but patient refusing.

## 2019-05-28 NOTE — PROGRESS NOTE ADULT - SUBJECTIVE AND OBJECTIVE BOX
Physical Medicine and Rehabilitation Progress Note:    Patient is a 78y old  Male who presents with a chief complaint of Generalized weakness (28 May 2019 13:56)      HPI:  78M with h/o IDDM, HTN, OA BIBA to [facility] for generalized weakness since waking up this morning. He noticed having increased urinary frequency for the last two days and intermittent dysuria today. Denies hx of BPH or discomfort with voiding. Denies nasal congestion, increased sputum production or cough. Patient lives alone since his wife passed away last year, has no children. He used to work at a ware-house, currently retired. He is a former smoker, quit smoking like 15 years ago. Of note, patient was at [facility] ED four days ago after sliding off his cough and falling on his knee. X-ray showed b/l OA of the knees. Pt uses cane at baseline for ambulation. He has taking Tylenol and Motrin daily for pain control.      At [facility], vitals Tm 100.7, P95, 165/78, R18 - R22, Saturation 93%RA -> 95% on 2L NC. Pt received ceftriaxone 1g, azithromycin 500mg, Lasix 20mg iv, 3L NS, Tylenol 650mg. Pt transferred to [facility] for further care.     ROS: Has increased urinary frequency and occasional dysuria. Reports chronic back pain and b/l knee pain. Denies fever, chills, SOB, CP, palpitations, abdominal pain, n/v or diarrhea. Last BM yesterday. (22 May 2019 21:46)                            12.0   14.20 )-----------( 331      ( 28 May 2019 06:34 )             39.1       05-28    140  |  105  |  75<H>  ----------------------------<  151<H>  4.1   |  19<L>  |  2.86<H>    Ca    9.1      28 May 2019 06:34  Mg     2.5     05-28    TPro  7.3  /  Alb  2.8<L>  /  TBili  0.2  /  DBili  x   /  AST  21  /  ALT  27  /  AlkPhos  112  05-28    Vital Signs Last 24 Hrs  T(C): 36.7 (28 May 2019 13:59), Max: 36.8 (27 May 2019 18:26)  T(F): 98.1 (28 May 2019 13:59), Max: 98.2 (27 May 2019 18:26)  HR: 94 (28 May 2019 13:58) (90 - 113)  BP: 161/70 (28 May 2019 13:58) (117/59 - 161/70)  BP(mean): 90 (28 May 2019 05:50) (90 - 90)  RR: 20 (28 May 2019 13:58) (18 - 20)  SpO2: 91% (28 May 2019 13:58) (91% - 95%)    MEDICATIONS  (STANDING):  apixaban 5 milliGRAM(s) Oral every 12 hours  atorvastatin 10 milliGRAM(s) Oral at bedtime  cefTRIAXone   IVPB 2 Gram(s) IV Intermittent every 24 hours  docusate sodium 100 milliGRAM(s) Oral two times a day  gabapentin 300 milliGRAM(s) Oral three times a day  insulin glargine Injectable (LANTUS) 10 Unit(s) SubCutaneous at bedtime  insulin lispro (HumaLOG) corrective regimen sliding scale   SubCutaneous Before meals and at bedtime  metoprolol tartrate 25 milliGRAM(s) Oral two times a day  polyethylene glycol 3350 17 Gram(s) Oral daily  senna 2 Tablet(s) Oral at bedtime  sertraline 100 milliGRAM(s) Oral daily    MEDICATIONS  (PRN):  acetaminophen   Tablet .. 650 milliGRAM(s) Oral every 6 hours PRN Temp greater or equal to 38C (100.4F), Moderate Pain (4 - 6)    Currently Undergoing Physical Therapy at bedside.    Functional Status Assessment:      Pain Assessment/Number Scale (0-10) Adult  Presence of Pain: complains of pain/discomfort  Body Location: B knees and ankles    Therapeutic Interventions      Bed Mobility  Bed Mobility Training Rehab Potential: fair, will monitor progress closely  Bed Mobility Training Symptoms Noted During/After Treatment: increased pain;  fatigue;  significant change in vital signs;  HR up to 120s bpm  Bed Mobility Training Rolling/Turning: moderate assist (50% patient effort);  1 person assist;  nonverbal cues (demo/gestures);  bed rails  Bed Mobility Training Scooting: maximum assist (25% patient effort);  2 person assist  Bed Mobility Training Sit-to-Supine: maximum assist (25% patient effort);  2 person assist  Bed Mobility Training Supine-to-Sit: maximum assist (25% patient effort);  2 person assist  Bed Mobility Training Limitations: decreased ability to use arms for pushing/pulling;  decreased ability to use legs for bridging/pushing;  decreased strength;  impaired balance;  pain    Sit-Stand Transfer Training  Transfer Training Sit-to-Stand Transfer: unable to perform;  2 person assist;  rolling walker;  2 trials  Sit-to-Stand Transfer Training Transfer Safety Analysis: decreased balance;  decreased strength;  impaired balance;  pain    Therapeutic Exercise  Therapeutic Exercise Detail: seated therapeutic exercise: ankle pumps, long arc quads, marching x 10           PM&R Impression: as above    Disposition Plan Recommendations: subacute rehab placement

## 2019-05-28 NOTE — PROGRESS NOTE ADULT - ASSESSMENT
78M with h/o IDDM, HTN and OA who was BIBA to [facility] for generalized weakness, found to have sepsis 2/2 E. coli bacteremia 2/2 UTI admitted to Located [facility] and found to have new onset atrial flutter, now transferred to [facility] for further management.

## 2019-05-28 NOTE — PROGRESS NOTE ADULT - PROBLEM SELECTOR PLAN 2
New Aflutter.  HR improves to 90s, on Tele can bump to 120s.   - Continue lopressor to 25mg BID   - started on Eliquis 2.5mg BID but does not meet two criteria so increased to Eliquis 5mg Po BID.   - EP service consulted.  To consider ablation possible Wednesday, Will need JAROD prior to ablation.  ID service consulted for Clearance prior to procedure.  f/u further EP Recs. New Aflutter.  HR improves to 90s, on Tele can bump to 120s.   - Continue lopressor to 25mg BID   - started on Eliquis 2.5mg BID but does not meet two criteria so increased to Eliquis 5mg Po BID.   - EP service consulted. NPO after midnight for JAROD/Ablation on 5/29/19

## 2019-05-28 NOTE — PROGRESS NOTE ADULT - ASSESSMENT
78M with h/o IDDM, HTN, OA BIBA to [facility] for generalized weakness, found to have sepsis 2/2 UTI, now with E.coli bacteremia, being treated with Ceftriaxone (started 5/22 ). Also with A flutter and new onset CHF - planned for ablation for 5/29 after ID clearance. From renal prospective renal function slightly better today, hemosdynamic stable, unknown baseline renal fucntion     Martin vs CKD  unknown baseline cr  - initially worsening of the renal function in the setting of urosepsis - UTI with E.coli - treated with Cefitriaxone, possible worsening of the renal function from cardiorenal syndrome - A flutter and new onset CHF   - stable renal function - slightly better today   - volume status acceptable   - renal u/s no hydronephrosis   - bladder scan on daily bases   - please obtain quantification of the proteinuria   - electrolytes noted   - metabolic acidosis - please consider sodium bicarbonate 650 mg three times a day   - in and outs   - daily weight     A flutter   - planned for ablation for 5/29   - on metorpolol   - on Apixaban     CHF   - treatement as per primary team   - EF 35%    Hypertension   - BP with fair control   - if consistently above systolic >160 mmHg - consider amlodipine 5 mg daily

## 2019-05-28 NOTE — PROGRESS NOTE ADULT - PROBLEM SELECTOR PLAN 3
MANAN on CKD now improving. (baseline Crt 1.8-1.9, diabetic nephropathy)  - Renal service following, f/u recs  - Cr 3.67 on arrival s/p 3L IVF at [facility]  - Crt remains stable today 79/3.01  - FeUrea 49.6 c/w intrinsic renal injury   - Hold home Enalapril and Lasix  - retroperitoneal US: B/L renal cysts measuring up to 5.5 cm on the right and 5.1cm on the left, negative for hydronephrosis MANAN on CKD now improving. (baseline Crt 1.8-1.9, diabetic nephropathy)  - Renal service following, f/u recs  - Cr 3.67 on arrival s/p 3L IVF at [facility]  - Crt downtrending 2.86 on 5/28/19  - FeUrea 49.6 c/w intrinsic renal injury   - Hold home Enalapril and Lasix  - retroperitoneal US: B/L renal cysts measuring up to 5.5 cm on the right and 5.1cm on the left, negative for hydronephrosis

## 2019-05-28 NOTE — PROGRESS NOTE ADULT - ASSESSMENT
78M with h/o IDDM, HTN and OA who was BIBA to [facility] for generalized weakness, found to have sepsis 2/2 E. coli bacteremia 2/2 UTI admitted to [facility] and found to have new onset atrial flutter, now transferred to [facility] for further management.    Problem/Plan - 1:  ·  Problem: Sepsis.  Plan: Sepsis secondary to urinary tract infection, now resolved  - Continue leukocytosis 14.90, afebrile.    - urinary tract infection c/b gram negative bacteremia.  - CXR without focal consolidation.   - Initial blood cultures growing E. coli susceptible to Ceftriaxone, Surveillance blood Cx negative to date.   - UCx NGTD however this was on antibiotics.   - CT abd w/o contrast w/ minimal hydronephrosis w/ perinephric stranding, findings with possible pyelonephritis or recently passed kidney stone.  - US retroperitoneal negative for hydronephrosis  - continue CTX 2g q24h for coverage of bacteremia, will require two week course of appropriate antibiotic therapy from last set of negative cultures (through 6/7)  - per renal holding oxybutynin as can increase risk of UTI.  - ID service consulted for ID Clearance prior JAROD/Ablation per EP request.     Problem/Plan - 2:  ·  Problem: Atrial flutter.  Plan: New Aflutter.  HR improves to 90s, on Tele can bump to 120s.   - Continue lopressor to 25mg BID   - started on Eliquis 2.5mg BID but does not meet two criteria so increased to Eliquis 5mg Po BID.   - EP service consulted.  To consider ablation possible Wednesday, Will need JAROD prior to ablation.  ID service consulted for Clearance prior to procedure.  f/u further EP Recs.     Problem/Plan - 3:  ·  Problem: Acute kidney injury superimposed on CKD.  Plan: MANAN on CKD now improving. (baseline Crt 1.8-1.9, diabetic nephropathy)  - Renal service following, f/u recs  - Cr 3.67 on arrival s/p 3L IVF at [facility]  - Crt remains stable today 79/3.01  - FeUrea 49.6 c/w intrinsic renal injury   - Hold home Enalapril and Lasix  - retroperitoneal US: B/L renal cysts measuring up to 5.5 cm on the right and 5.1cm on the left, negative for hydronephrosis.     Problem/Plan - 4:  ·  Problem: Acute systolic congestive heart failure.  Plan: Pt denies hx of HF but on Furosemide 80mg QD at home. On admission BNP 3296 (received fluid while @ [facility]).   - remains euvolemic on exam. denies SOB, no edema.   - CXR on admission: Mild pulmonary venous congestion  - Echo: moderate global hypokinesis, concentric LVH, EF 35%  - Holding Lasix and lisinopril for now given MANAN.     Problem/Plan - 5:  ·  Problem: HTN (hypertension).  Plan: BP remains stable.  -150s  -holding home amlodipine 10mg and enalapril 10mg in setting of sepsis  -continue metoprolol 25mg BID  - Continue to monitor and consider resuming home meds.     Problem/Plan - 6:  Problem: CAD (coronary artery disease). Plan: Trop 0.07 --> 0.08, asymptomatic, EKG with no acute ST/T-wave changes. Likely secondary to demand ischemia in setting of sepsis/bacteremia  - hx of non obstructive, follows up with Dr. Neil  - continue metoprolol 25mg BID and Atorvastatin 10mg (pravastatin @ home).    Problem/Plan - 7:  ·  Problem: IDDM (insulin dependent diabetes mellitus).  Plan: A1c 7.3  -continue Lantus 10 units qhs and ISS  - Patient with c/o persistent b/l leg pain from knee to feet, pain of feet with standing. c/w possible neuropathy.  Home Gabapentin increased to 300mg TID.  Continue to monitor.    VTE ppx: on Eliquis  Dispo: Continue IV Abx for Bacteremia.  EP consulted for Aflutter management.

## 2019-05-28 NOTE — PROGRESS NOTE ADULT - SUBJECTIVE AND OBJECTIVE BOX
Interventional Cardiology PA Adult Progress Note    CC: weakness  Subjective Assessment:    Generalized weakness. Endorsing LE pain, burning pain on his feet and b/l knee pain    ROS otherwise negative except as stated in HPI and subjective. 	  MEDICATIONS:  metoprolol tartrate 25 milliGRAM(s) Oral two times a day  cefTRIAXone   IVPB 2 Gram(s) IV Intermittent every 24 hours  acetaminophen   Tablet .. 650 milliGRAM(s) Oral every 6 hours PRN  gabapentin 300 milliGRAM(s) Oral three times a day  sertraline 100 milliGRAM(s) Oral daily  docusate sodium 100 milliGRAM(s) Oral two times a day  polyethylene glycol 3350 17 Gram(s) Oral daily  senna 2 Tablet(s) Oral at bedtime  atorvastatin 10 milliGRAM(s) Oral at bedtime  insulin glargine Injectable (LANTUS) 10 Unit(s) SubCutaneous at bedtime  insulin lispro (HumaLOG) corrective regimen sliding scale   SubCutaneous Before meals and at bedtime  apixaban 5 milliGRAM(s) Oral every 12 hours    PHYSICAL EXAM:  TELEMETRY: atrial flutter   T(C): 36.6 (05-28-19 @ 08:44), Max: 36.8 (05-27-19 @ 18:26)  HR: 96 (05-28-19 @ 10:30) (90 - 113)  BP: 152/72 (05-28-19 @ 10:30) (117/59 - 152/77)  RR: 20 (05-28-19 @ 08:35) (18 - 20)  SpO2: 95% (05-28-19 @ 10:30) (91% - 95%)  Wt(kg): --  I&O's Summary    27 May 2019 07:01  -  28 May 2019 07:00  --------------------------------------------------------  IN: 240 mL / OUT: 750 mL / NET: -510 mL    28 May 2019 07:01  -  28 May 2019 13:57  --------------------------------------------------------  IN: 120 mL / OUT: 225 mL / NET: -105 mL        Beckham:  Central/PICC/Mid Line:                                         Appearance: Normal	  HEENT:   Normal oral mucosa, PERRL, EOMI	  Neck: Supple, + JVD/ - JVD; Carotid Bruit   Cardiovascular: Normal S1 S2, No JVD, No murmurs,   Respiratory: Lungs clear to auscultation/Decreased Breath Sounds/No Rales, Rhonchi, Wheezing	  Gastrointestinal:  Soft, Non-tender, + BS	  Skin: No rashes, No ecchymoses, No cyanosis  Extremities: Normal range of motion, No clubbing, cyanosis or edema  Vascular: Peripheral pulses palpable 2+ bilaterally  Neurologic: Non-focal  Psychiatry: A & O x 3, Mood & affect appropriate    LABS:                        12.0   14.20 )-----------( 331      ( 28 May 2019 06:34 )             39.1     05-28    140  |  105  |  75<H>  ----------------------------<  151<H>  4.1   |  19<L>  |  2.86<H>    Ca    9.1      28 May 2019 06:34  Mg     2.5     05-28    TPro  7.3  /  Alb  2.8<L>  /  TBili  0.2  /  DBili  x   /  AST  21  /  ALT  27  /  AlkPhos  112  05-28        ASSESSMENT/PLAN:

## 2019-05-29 LAB
ALBUMIN SERPL ELPH-MCNC: 2.8 G/DL — LOW (ref 3.3–5)
ALP SERPL-CCNC: 111 U/L — SIGNIFICANT CHANGE UP (ref 40–120)
ALT FLD-CCNC: 27 U/L — SIGNIFICANT CHANGE UP (ref 10–45)
ANION GAP SERPL CALC-SCNC: 15 MMOL/L — SIGNIFICANT CHANGE UP (ref 5–17)
ANION GAP SERPL CALC-SCNC: 17 MMOL/L — SIGNIFICANT CHANGE UP (ref 5–17)
APTT BLD: 34.1 SEC — SIGNIFICANT CHANGE UP (ref 27.5–36.3)
AST SERPL-CCNC: 22 U/L — SIGNIFICANT CHANGE UP (ref 10–40)
BASE EXCESS BLDV CALC-SCNC: -5.7 MMOL/L — SIGNIFICANT CHANGE UP
BILIRUB SERPL-MCNC: 0.2 MG/DL — SIGNIFICANT CHANGE UP (ref 0.2–1.2)
BUN SERPL-MCNC: 74 MG/DL — HIGH (ref 7–23)
BUN SERPL-MCNC: 81 MG/DL — HIGH (ref 7–23)
CALCIUM SERPL-MCNC: 9 MG/DL — SIGNIFICANT CHANGE UP (ref 8.4–10.5)
CALCIUM SERPL-MCNC: 9 MG/DL — SIGNIFICANT CHANGE UP (ref 8.4–10.5)
CHLORIDE SERPL-SCNC: 104 MMOL/L — SIGNIFICANT CHANGE UP (ref 96–108)
CHLORIDE SERPL-SCNC: 104 MMOL/L — SIGNIFICANT CHANGE UP (ref 96–108)
CO2 SERPL-SCNC: 17 MMOL/L — LOW (ref 22–31)
CO2 SERPL-SCNC: 21 MMOL/L — LOW (ref 22–31)
CREAT SERPL-MCNC: 2.83 MG/DL — HIGH (ref 0.5–1.3)
CREAT SERPL-MCNC: 3.04 MG/DL — HIGH (ref 0.5–1.3)
CULTURE RESULTS: SIGNIFICANT CHANGE UP
CULTURE RESULTS: SIGNIFICANT CHANGE UP
GAS PNL BLDV: SIGNIFICANT CHANGE UP
GLUCOSE BLDC GLUCOMTR-MCNC: 139 MG/DL — HIGH (ref 70–99)
GLUCOSE BLDC GLUCOMTR-MCNC: 141 MG/DL — HIGH (ref 70–99)
GLUCOSE BLDC GLUCOMTR-MCNC: 142 MG/DL — HIGH (ref 70–99)
GLUCOSE BLDC GLUCOMTR-MCNC: 146 MG/DL — HIGH (ref 70–99)
GLUCOSE BLDC GLUCOMTR-MCNC: 149 MG/DL — HIGH (ref 70–99)
GLUCOSE BLDC GLUCOMTR-MCNC: 164 MG/DL — HIGH (ref 70–99)
GLUCOSE SERPL-MCNC: 159 MG/DL — HIGH (ref 70–99)
GLUCOSE SERPL-MCNC: 163 MG/DL — HIGH (ref 70–99)
HCO3 BLDV-SCNC: 21 MMOL/L — SIGNIFICANT CHANGE UP (ref 20–27)
HCT VFR BLD CALC: 37.5 % — LOW (ref 39–50)
HCT VFR BLD CALC: 39.8 % — SIGNIFICANT CHANGE UP (ref 39–50)
HGB BLD-MCNC: 11.1 G/DL — LOW (ref 13–17)
HGB BLD-MCNC: 12.4 G/DL — LOW (ref 13–17)
INR BLD: 1.52 — HIGH (ref 0.88–1.16)
LACTATE SERPL-SCNC: 1.1 MMOL/L — SIGNIFICANT CHANGE UP (ref 0.5–2)
MAGNESIUM SERPL-MCNC: 2.4 MG/DL — SIGNIFICANT CHANGE UP (ref 1.6–2.6)
MAGNESIUM SERPL-MCNC: 2.5 MG/DL — SIGNIFICANT CHANGE UP (ref 1.6–2.6)
MCHC RBC-ENTMCNC: 25.2 PG — LOW (ref 27–34)
MCHC RBC-ENTMCNC: 25.9 PG — LOW (ref 27–34)
MCHC RBC-ENTMCNC: 29.6 GM/DL — LOW (ref 32–36)
MCHC RBC-ENTMCNC: 31.2 GM/DL — LOW (ref 32–36)
MCV RBC AUTO: 83.3 FL — SIGNIFICANT CHANGE UP (ref 80–100)
MCV RBC AUTO: 85.2 FL — SIGNIFICANT CHANGE UP (ref 80–100)
NRBC # BLD: 0 /100 WBCS — SIGNIFICANT CHANGE UP (ref 0–0)
NRBC # BLD: 0 /100 WBCS — SIGNIFICANT CHANGE UP (ref 0–0)
PCO2 BLDV: 47 MMHG — SIGNIFICANT CHANGE UP (ref 41–51)
PH BLDV: 7.27 — LOW (ref 7.32–7.43)
PLATELET # BLD AUTO: 345 K/UL — SIGNIFICANT CHANGE UP (ref 150–400)
PLATELET # BLD AUTO: 362 K/UL — SIGNIFICANT CHANGE UP (ref 150–400)
PO2 BLDV: 50 MMHG — SIGNIFICANT CHANGE UP
POTASSIUM SERPL-MCNC: 4.5 MMOL/L — SIGNIFICANT CHANGE UP (ref 3.5–5.3)
POTASSIUM SERPL-MCNC: 4.8 MMOL/L — SIGNIFICANT CHANGE UP (ref 3.5–5.3)
POTASSIUM SERPL-SCNC: 4.5 MMOL/L — SIGNIFICANT CHANGE UP (ref 3.5–5.3)
POTASSIUM SERPL-SCNC: 4.8 MMOL/L — SIGNIFICANT CHANGE UP (ref 3.5–5.3)
PROT SERPL-MCNC: 7.5 G/DL — SIGNIFICANT CHANGE UP (ref 6–8.3)
PROTHROM AB SERPL-ACNC: 17.4 SEC — HIGH (ref 10–12.9)
RBC # BLD: 4.4 M/UL — SIGNIFICANT CHANGE UP (ref 4.2–5.8)
RBC # BLD: 4.78 M/UL — SIGNIFICANT CHANGE UP (ref 4.2–5.8)
RBC # FLD: 15.2 % — HIGH (ref 10.3–14.5)
RBC # FLD: 15.3 % — HIGH (ref 10.3–14.5)
SAO2 % BLDV: 78 % — SIGNIFICANT CHANGE UP
SODIUM SERPL-SCNC: 138 MMOL/L — SIGNIFICANT CHANGE UP (ref 135–145)
SODIUM SERPL-SCNC: 140 MMOL/L — SIGNIFICANT CHANGE UP (ref 135–145)
SPECIMEN SOURCE: SIGNIFICANT CHANGE UP
SPECIMEN SOURCE: SIGNIFICANT CHANGE UP
WBC # BLD: 11.77 K/UL — HIGH (ref 3.8–10.5)
WBC # BLD: 13.99 K/UL — HIGH (ref 3.8–10.5)
WBC # FLD AUTO: 11.77 K/UL — HIGH (ref 3.8–10.5)
WBC # FLD AUTO: 13.99 K/UL — HIGH (ref 3.8–10.5)

## 2019-05-29 PROCEDURE — 93320 DOPPLER ECHO COMPLETE: CPT | Mod: 26

## 2019-05-29 PROCEDURE — 93653 COMPRE EP EVAL TX SVT: CPT

## 2019-05-29 PROCEDURE — 93312 ECHO TRANSESOPHAGEAL: CPT | Mod: 26

## 2019-05-29 PROCEDURE — 93325 DOPPLER ECHO COLOR FLOW MAPG: CPT | Mod: 26

## 2019-05-29 PROCEDURE — 93010 ELECTROCARDIOGRAM REPORT: CPT

## 2019-05-29 PROCEDURE — 71045 X-RAY EXAM CHEST 1 VIEW: CPT | Mod: 26

## 2019-05-29 PROCEDURE — 93613 INTRACARDIAC EPHYS 3D MAPG: CPT

## 2019-05-29 PROCEDURE — 93621 COMP EP EVL L PAC&REC C SINS: CPT | Mod: 26

## 2019-05-29 RX ORDER — FUROSEMIDE 40 MG
40 TABLET ORAL ONCE
Refills: 0 | Status: COMPLETED | OUTPATIENT
Start: 2019-05-29 | End: 2019-05-29

## 2019-05-29 RX ORDER — APIXABAN 2.5 MG/1
5 TABLET, FILM COATED ORAL EVERY 12 HOURS
Refills: 0 | Status: DISCONTINUED | OUTPATIENT
Start: 2019-05-30 | End: 2019-06-04

## 2019-05-29 RX ADMIN — GABAPENTIN 300 MILLIGRAM(S): 400 CAPSULE ORAL at 05:39

## 2019-05-29 RX ADMIN — Medication 2: at 07:02

## 2019-05-29 RX ADMIN — Medication 100 MILLIGRAM(S): at 05:39

## 2019-05-29 RX ADMIN — Medication 40 MILLIGRAM(S): at 20:00

## 2019-05-29 RX ADMIN — SERTRALINE 100 MILLIGRAM(S): 25 TABLET, FILM COATED ORAL at 14:56

## 2019-05-29 RX ADMIN — GABAPENTIN 300 MILLIGRAM(S): 400 CAPSULE ORAL at 14:55

## 2019-05-29 RX ADMIN — Medication 650 MILLIGRAM(S): at 14:56

## 2019-05-29 RX ADMIN — CEFTRIAXONE 100 GRAM(S): 500 INJECTION, POWDER, FOR SOLUTION INTRAMUSCULAR; INTRAVENOUS at 05:39

## 2019-05-29 RX ADMIN — Medication 25 MILLIGRAM(S): at 05:39

## 2019-05-29 RX ADMIN — Medication 650 MILLIGRAM(S): at 16:00

## 2019-05-29 RX ADMIN — APIXABAN 5 MILLIGRAM(S): 2.5 TABLET, FILM COATED ORAL at 05:39

## 2019-05-29 NOTE — PROGRESS NOTE ADULT - PROBLEM SELECTOR PLAN 10
Transition of Care   1) PCP Contacted on Admission: (Y/N) --> Name & Phone #: Dr. Lundberg [phone];   Dr. Neil [phone], N/A  2) Date of Contact with PCP: TBD  3) PCP Contacted at Discharge: (Y/N): TBD  4) Summary of Handoff Given to PCP: TBD  5) Post-Discharge Appointment Date and Location: TBD

## 2019-05-29 NOTE — PROGRESS NOTE ADULT - ASSESSMENT
78M with h/o IDDM, HTN and OA who was BIBA to [facility] for generalized weakness, found to have sepsis 2/2 E. coli bacteremia 2/2 UTI admitted to [facility] and found to have new onset atrial flutter, now transferred to [facility] for further management. Pt was optimized and now underwent JARDO and AFlutter ablation on 5/29/19. Pt had a difficult intubation and is intubated and will be maintained in the CCU.

## 2019-05-29 NOTE — PROGRESS NOTE ADULT - PROBLEM SELECTOR PLAN 4
Pt denies hx of HF but on Furosemide 80mg QD at home. On admission BNP 3296 (received fluid while @ [facility]).   - remains euvolemic on exam. denies SOB, no edema.   - CXR on admission: Mild pulmonary venous congestion  - Echo: moderate global hypokinesis, concentric LVH, EF 35%  - Holding Lasix and lisinopril for now given MANAN. Pt denies hx of HF but on Furosemide 80mg QD at home. On admission BNP 3296 (received fluid while @ [facility]).   - remains euvolemic on exam. denies SOB, no edema.   - CXR on admission: Mild pulmonary venous congestion  - Echo: moderate global hypokinesis, concentric LVH, EF 35%  -JAROD with mild LVH, global LV hypokinesis, dilated LA, mild-mod MR, moderate plaque in aorta  - Holding Lasix and lisinopril for now given MANAN.

## 2019-05-29 NOTE — PROGRESS NOTE ADULT - PROBLEM SELECTOR PLAN 2
Sepsis secondary to urinary tract infection, now resolved  - Continue leukocytosis 14.90, afebrile.    - urinary tract infection c/b gram negative bacteremia.  - CXR without focal consolidation.   - Initial blood cultures growing E. coli susceptible to Ceftriaxone, Surveillance blood Cx negative to date.   - UCx NGTD however this was on antibiotics.   - CT abd w/o contrast w/ minimal hydronephrosis w/ perinephric stranding, findings with possible pyelonephritis or recently passed kidney stone.  - US retroperitoneal negative for hydronephrosis  - continue CTX 2g q24h for coverage of bacteremia, will require two week course of appropriate antibiotic therapy from last set of negative cultures (through 6/7)  - per renal holding oxybutynin as can increase risk of UTI.  - ID service consulted and gave clearance for JAROD/ablation Sepsis (Tm 100.7, WBC 15 on admission) secondary to urinary tract infection. s/p 3L NS in ED, reperfusion assessment completed. c/b bacteremia with Ecoli, however urine cx negative (pt already received abx), surveillance blood cx 5/24 NGTD. CXR without focal consolidation.   -c/w CTX 2g QD (day 7/14)  - Pt afebrile, however w/ persistent leukocytosis   - CT abd w/o contrast w/ minimal hydronephrosis w/ perinephric stranding, findings with possible pyelonephritis or recently passed kidney stone.  - US retroperitoneal negative for hydronephrosis  - per renal holding oxybutynin as can increase risk of UTI.  - ID service consulted and gave clearance for JAROD/ablation    #Ecoli bacteremia 2/2 UTI  -plan as above Sepsis (Tm 100.7, WBC 15 on admission) secondary to urinary tract infection. s/p 3L NS in ED, reperfusion assessment completed. c/b bacteremia with Ecoli, however urine cx negative (pt already received abx), surveillance blood cx 5/24 NGTD. CXR without focal consolidation.   -c/w CTX 2g QD (day 7/14 until 6/7)  - Pt afebrile, however w/ persistent leukocytosis   - CT abd w/o contrast w/ minimal hydronephrosis w/ perinephric stranding, findings with possible pyelonephritis or recently passed kidney stone.  - US retroperitoneal negative for hydronephrosis  - per renal holding oxybutynin as can increase risk of UTI.  - ID service consulted and gave clearance for JAROD/ablation    #Ecoli bacteremia 2/2 UTI  -plan as above

## 2019-05-29 NOTE — PROGRESS NOTE ADULT - PROBLEM SELECTOR PLAN 4
Pt denies hx of HF but on Furosemide 80mg QD at home. On admission BNP 3296 (received fluid while @ [facility]).   - remains euvolemic on exam. denies SOB, no edema.   - CXR on admission: Mild pulmonary venous congestion  - Echo: moderate global hypokinesis, concentric LVH, EF 35%  - Holding Lasix and lisinopril for now given MANAN.

## 2019-05-29 NOTE — PROGRESS NOTE ADULT - SUBJECTIVE AND OBJECTIVE BOX
Hospital course:   Patient is a 77yo M with a PMHx of DM, HTN, OA BIBA to [facility] for generalized weakness, increased urinary frequency and intermittent dysuria. Patient found to have sepsis secondary to E. coli bacteremia 2/2 UTI on admission, now resolving. Hospital course c/b the development of new onset atrial flutter and globally depressed EF, likely due to the concomitant sepsis. Patient started on Ceftriaxone 2g Q24H with resolution of sepsis and clearance of bacteremia. Started on anticoagulation and beta blockade for atrial flutter. Cardiology consulted for management of atrial flutter given resolving sepsis and decision made to transfer patient to cardiology service for telemetry monitoring and further management of atrial flutter. Pt had JAROD and flutter of Aflutter today, now transferred to CCU for further management.     Subjective:     Objective:   VITAL SIGNS:  T(C): 36.7 (05-29-19 @ 14:01), Max: 36.8 (05-29-19 @ 09:24)  T(F): 98.1 (05-29-19 @ 14:01), Max: 98.3 (05-29-19 @ 09:24)  HR: 72 (05-29-19 @ 16:37) (72 - 116)  BP: 126/57 (05-29-19 @ 16:37) (121/74 - 170/91)  BP(mean): 82 (05-29-19 @ 16:37) (82 - 98)  RR: 16 (05-29-19 @ 16:37) (16 - 20)  SpO2: 93% (05-29-19 @ 16:37) (93% - 96%)  Wt(kg): --      MEDICATIONS  (STANDING):  apixaban 5 milliGRAM(s) Oral every 12 hours  atorvastatin 10 milliGRAM(s) Oral at bedtime  cefTRIAXone   IVPB 2 Gram(s) IV Intermittent every 24 hours  docusate sodium 100 milliGRAM(s) Oral two times a day  gabapentin 300 milliGRAM(s) Oral three times a day  insulin glargine Injectable (LANTUS) 10 Unit(s) SubCutaneous at bedtime  insulin lispro (HumaLOG) corrective regimen sliding scale   SubCutaneous Before meals and at bedtime  metoprolol tartrate 25 milliGRAM(s) Oral two times a day  polyethylene glycol 3350 17 Gram(s) Oral daily  senna 2 Tablet(s) Oral at bedtime  sertraline 100 milliGRAM(s) Oral daily    MEDICATIONS  (PRN):  acetaminophen   Tablet .. 650 milliGRAM(s) Oral every 6 hours PRN Temp greater or equal to 38C (100.4F), Moderate Pain (4 - 6)    .  LABS:                         12.4   13.99 )-----------( 362      ( 29 May 2019 06:26 )             39.8     05-29    138  |  104  |  74<H>  ----------------------------<  163<H>  4.5   |  17<L>  |  2.83<H>    Ca    9.0      29 May 2019 06:26  Mg     2.5     05-29    TPro  7.5  /  Alb  2.8<L>  /  TBili  0.2  /  DBili  x   /  AST  22  /  ALT  27  /  AlkPhos  111  05-29    PT/INR - ( 29 May 2019 06:26 )   PT: 17.4 sec;   INR: 1.52          PTT - ( 29 May 2019 06:26 )  PTT:34.1 sec              RADIOLOGY, EKG & ADDITIONAL TESTS: Reviewed. Hospital course:   Patient is a 79yo M with a PMHx of DM, HTN, OA BIBA to [facility] for generalized weakness, increased urinary frequency and intermittent dysuria. Patient found to have sepsis secondary to E. coli bacteremia 2/2 UTI on admission, now resolving. Hospital course c/b the development of new onset atrial flutter and globally depressed EF, likely due to the concomitant sepsis. Patient started on Ceftriaxone 2g Q24H with resolution of sepsis and clearance of bacteremia. Started on anticoagulation and beta blockade for atrial flutter. Cardiology consulted for management of atrial flutter given resolving sepsis and decision made to transfer patient to cardiology service for telemetry monitoring and further management of atrial flutter. Pt had JAROD and flutter of Aflutter today, c/b difficult intubation, now transferred to CCU for further management.     Subjective:     Objective:   VITAL SIGNS:  T(C): 36.7 (05-29-19 @ 14:01), Max: 36.8 (05-29-19 @ 09:24)  T(F): 98.1 (05-29-19 @ 14:01), Max: 98.3 (05-29-19 @ 09:24)  HR: 72 (05-29-19 @ 16:37) (72 - 116)  BP: 126/57 (05-29-19 @ 16:37) (121/74 - 170/91)  BP(mean): 82 (05-29-19 @ 16:37) (82 - 98)  RR: 16 (05-29-19 @ 16:37) (16 - 20)  SpO2: 93% (05-29-19 @ 16:37) (93% - 96%)  Wt(kg): --      MEDICATIONS  (STANDING):  apixaban 5 milliGRAM(s) Oral every 12 hours  atorvastatin 10 milliGRAM(s) Oral at bedtime  cefTRIAXone   IVPB 2 Gram(s) IV Intermittent every 24 hours  docusate sodium 100 milliGRAM(s) Oral two times a day  gabapentin 300 milliGRAM(s) Oral three times a day  insulin glargine Injectable (LANTUS) 10 Unit(s) SubCutaneous at bedtime  insulin lispro (HumaLOG) corrective regimen sliding scale   SubCutaneous Before meals and at bedtime  metoprolol tartrate 25 milliGRAM(s) Oral two times a day  polyethylene glycol 3350 17 Gram(s) Oral daily  senna 2 Tablet(s) Oral at bedtime  sertraline 100 milliGRAM(s) Oral daily    MEDICATIONS  (PRN):  acetaminophen   Tablet .. 650 milliGRAM(s) Oral every 6 hours PRN Temp greater or equal to 38C (100.4F), Moderate Pain (4 - 6)    .  LABS:                         12.4   13.99 )-----------( 362      ( 29 May 2019 06:26 )             39.8     05-29    138  |  104  |  74<H>  ----------------------------<  163<H>  4.5   |  17<L>  |  2.83<H>    Ca    9.0      29 May 2019 06:26  Mg     2.5     05-29    TPro  7.5  /  Alb  2.8<L>  /  TBili  0.2  /  DBili  x   /  AST  22  /  ALT  27  /  AlkPhos  111  05-29    PT/INR - ( 29 May 2019 06:26 )   PT: 17.4 sec;   INR: 1.52          PTT - ( 29 May 2019 06:26 )  PTT:34.1 sec              RADIOLOGY, EKG & ADDITIONAL TESTS: Reviewed. Hospital course:   Patient is a 77yo M with a PMHx of DM, HTN, OA BIBA to [facility] for generalized weakness, increased urinary frequency and intermittent dysuria. Patient found to have sepsis secondary to E. coli bacteremia 2/2 UTI on admission, now resolving. Hospital course c/b the development of new onset atrial flutter and globally depressed EF, likely due to the concomitant sepsis. Patient started on Ceftriaxone 2g Q24H with resolution of sepsis and clearance of bacteremia. Started on anticoagulation and beta blockade for atrial flutter. Cardiology consulted for management of atrial flutter given resolving sepsis and decision made to transfer patient to cardiology service for telemetry monitoring and further management of atrial flutter. Pt had JAROD and flutter of Aflutter today, c/b difficult intubation, now transferred to CCU for further management.     Subjective:     Objective:   VITAL SIGNS:  T(C): 36.7 (05-29-19 @ 14:01), Max: 36.8 (05-29-19 @ 09:24)  T(F): 98.1 (05-29-19 @ 14:01), Max: 98.3 (05-29-19 @ 09:24)  HR: 72 (05-29-19 @ 16:37) (72 - 116)  BP: 126/57 (05-29-19 @ 16:37) (121/74 - 170/91)  BP(mean): 82 (05-29-19 @ 16:37) (82 - 98)  RR: 16 (05-29-19 @ 16:37) (16 - 20)  SpO2: 93% (05-29-19 @ 16:37) (93% - 96%)  Wt(kg): --      MEDICATIONS  (STANDING):  apixaban 5 milliGRAM(s) Oral every 12 hours  atorvastatin 10 milliGRAM(s) Oral at bedtime  cefTRIAXone   IVPB 2 Gram(s) IV Intermittent every 24 hours  docusate sodium 100 milliGRAM(s) Oral two times a day  gabapentin 300 milliGRAM(s) Oral three times a day  insulin glargine Injectable (LANTUS) 10 Unit(s) SubCutaneous at bedtime  insulin lispro (HumaLOG) corrective regimen sliding scale   SubCutaneous Before meals and at bedtime  metoprolol tartrate 25 milliGRAM(s) Oral two times a day  polyethylene glycol 3350 17 Gram(s) Oral daily  senna 2 Tablet(s) Oral at bedtime  sertraline 100 milliGRAM(s) Oral daily    MEDICATIONS  (PRN):  acetaminophen   Tablet .. 650 milliGRAM(s) Oral every 6 hours PRN Temp greater or equal to 38C (100.4F), Moderate Pain (4 - 6)    .  LABS:                         12.4   13.99 )-----------( 362      ( 29 May 2019 06:26 )             39.8     05-29    138  |  104  |  74<H>  ----------------------------<  163<H>  4.5   |  17<L>  |  2.83<H>    Ca    9.0      29 May 2019 06:26  Mg     2.5     05-29    TPro  7.5  /  Alb  2.8<L>  /  TBili  0.2  /  DBili  x   /  AST  22  /  ALT  27  /  AlkPhos  111  05-29    PT/INR - ( 29 May 2019 06:26 )   PT: 17.4 sec;   INR: 1.52          PTT - ( 29 May 2019 06:26 )  PTT:34.1 sec              RADIOLOGY, EKG & ADDITIONAL TESTS: Reviewed.   < from: JAROD w/Doppler (05.29.19 @ 11:50) >  here is mild concentric left ventricular hypertrophy.There is mild global   hypokinesis of the left ventricle.The left atrium is dilated.No clot   seen in   the left atrium or in the left atrial appendage.Left atrial appendage   emptying   velocities are normal.Right atrial size is normal.The right ventricle is   normal in size and function.Structurally normal aortic valve.There is   mild   aortic regurgitation.Structurally normal mitral valve.There is mild to   moderate mitral regurgitation.Structurally normal tricuspid   valve.Structurally   normal pulmonic valve.No aortic root dilatation.Moderate atherosclerotic   plaque(s) in the aortic arch.Moderate atherosclerotic plaque(s) in the   descending aorta.There is no pericardial effusion.    < end of copied text > Hospital course:   Patient is a 79yo M with a PMHx of DM, HTN, OA BIBA to [facility] for generalized weakness, increased urinary frequency and intermittent dysuria. Patient found to have sepsis secondary to E. coli bacteremia 2/2 UTI on admission, now resolving. Hospital course c/b the development of new onset atrial flutter and globally depressed EF, likely due to the concomitant sepsis. Patient started on Ceftriaxone 2g Q24H with resolution of sepsis and clearance of bacteremia. Started on anticoagulation and beta blockade for atrial flutter. Cardiology consulted for management of atrial flutter given resolving sepsis and decision made to transfer patient to cardiology service for telemetry monitoring and further management of atrial flutter. Pt had JAROD and flutter of Aflutter today, c/b difficult intubation, now transferred to CCU for further management. Prior to transfer patient received 1.3L NS Bolus.     Subjective:     Objective:   VITAL SIGNS:  T(C): 36.7 (05-29-19 @ 14:01), Max: 36.8 (05-29-19 @ 09:24)  T(F): 98.1 (05-29-19 @ 14:01), Max: 98.3 (05-29-19 @ 09:24)  HR: 72 (05-29-19 @ 16:37) (72 - 116)  BP: 126/57 (05-29-19 @ 16:37) (121/74 - 170/91)  BP(mean): 82 (05-29-19 @ 16:37) (82 - 98)  RR: 16 (05-29-19 @ 16:37) (16 - 20)  SpO2: 93% (05-29-19 @ 16:37) (93% - 96%)  Wt(kg): --    PHYSICAL EXAM:  GENERAL: NAD, well-developed, on facemask   HEENT: MMM, no JVD, sclera nonicteric no LAD  CHEST/LUNG: Crackles bilaterally worse at bases, no wheezing, no rhonchi, good air entry   HEART: Regular rate and rhythm; No murmurs, rubs, or gallops  ABDOMEN: Soft, Nontender, Nondistended; Bowel sounds present  EXTREMITIES:  2+ Peripheral Pulses, No clubbing, cyanosis, or edema  PSYCH: AAOx1  NEUROLOGY: non-focal  SKIN: No rashes or lesions      MEDICATIONS  (STANDING):  apixaban 5 milliGRAM(s) Oral every 12 hours  atorvastatin 10 milliGRAM(s) Oral at bedtime  cefTRIAXone   IVPB 2 Gram(s) IV Intermittent every 24 hours  docusate sodium 100 milliGRAM(s) Oral two times a day  gabapentin 300 milliGRAM(s) Oral three times a day  insulin glargine Injectable (LANTUS) 10 Unit(s) SubCutaneous at bedtime  insulin lispro (HumaLOG) corrective regimen sliding scale   SubCutaneous Before meals and at bedtime  metoprolol tartrate 25 milliGRAM(s) Oral two times a day  polyethylene glycol 3350 17 Gram(s) Oral daily  senna 2 Tablet(s) Oral at bedtime  sertraline 100 milliGRAM(s) Oral daily    MEDICATIONS  (PRN):  acetaminophen   Tablet .. 650 milliGRAM(s) Oral every 6 hours PRN Temp greater or equal to 38C (100.4F), Moderate Pain (4 - 6)    .  LABS:                         12.4   13.99 )-----------( 362      ( 29 May 2019 06:26 )             39.8     05-29    138  |  104  |  74<H>  ----------------------------<  163<H>  4.5   |  17<L>  |  2.83<H>    Ca    9.0      29 May 2019 06:26  Mg     2.5     05-29    TPro  7.5  /  Alb  2.8<L>  /  TBili  0.2  /  DBili  x   /  AST  22  /  ALT  27  /  AlkPhos  111  05-29    PT/INR - ( 29 May 2019 06:26 )   PT: 17.4 sec;   INR: 1.52          PTT - ( 29 May 2019 06:26 )  PTT:34.1 sec              RADIOLOGY, EKG & ADDITIONAL TESTS: Reviewed.   < from: JAROD w/Doppler (05.29.19 @ 11:50) >  here is mild concentric left ventricular hypertrophy.There is mild global   hypokinesis of the left ventricle.The left atrium is dilated.No clot   seen in   the left atrium or in the left atrial appendage.Left atrial appendage   emptying   velocities are normal.Right atrial size is normal.The right ventricle is   normal in size and function.Structurally normal aortic valve.There is   mild   aortic regurgitation.Structurally normal mitral valve.There is mild to   moderate mitral regurgitation.Structurally normal tricuspid   valve.Structurally   normal pulmonic valve.No aortic root dilatation.Moderate atherosclerotic   plaque(s) in the aortic arch.Moderate atherosclerotic plaque(s) in the   descending aorta.There is no pericardial effusion.    < end of copied text >

## 2019-05-29 NOTE — PROGRESS NOTE ADULT - PROBLEM SELECTOR PLAN 9
VTE ppx: on Eliquis  GI: none  bowel: miralax, senna, colace    FULL CODE  Dispo: CCU. PT Recs ALEYDA but patient refusing.

## 2019-05-29 NOTE — PROGRESS NOTE ADULT - PROBLEM SELECTOR PLAN 2
New Aflutter.  HR improves to 90s, on Tele can bump to 120s.   - Continue lopressor to 25mg BID   - Continue Eliquis 5mg BID  - EP service consulted. s/p JAROD/A flutter ablation on 5/29/19 with difficult intubation and will be maintained in CCU overnight.

## 2019-05-29 NOTE — PROGRESS NOTE ADULT - ASSESSMENT
s.p Pyelonephritis     E coli    cont IV antibiotics   till June 7   Bacteremia cleared    A Fib For cardiac ablation   today

## 2019-05-29 NOTE — PROGRESS NOTE ADULT - SUBJECTIVE AND OBJECTIVE BOX
Pt is going for JAROD now and subsequent ablation     Leg pain improved with gabapentin     PAST MEDICAL & SURGICAL HISTORY:  OA (osteoarthritis)  HTN (hypertension)  IDDM (insulin dependent diabetes mellitus)  S/P cholecystectomy  S/P appendectomy  H/O knee surgery    MEDICATIONS  (STANDING):  apixaban 5 milliGRAM(s) Oral every 12 hours  atorvastatin 10 milliGRAM(s) Oral at bedtime  cefTRIAXone   IVPB 2 Gram(s) IV Intermittent every 24 hours  docusate sodium 100 milliGRAM(s) Oral two times a day  gabapentin 300 milliGRAM(s) Oral three times a day  insulin glargine Injectable (LANTUS) 10 Unit(s) SubCutaneous at bedtime  insulin lispro (HumaLOG) corrective regimen sliding scale   SubCutaneous Before meals and at bedtime  metoprolol tartrate 25 milliGRAM(s) Oral two times a day  polyethylene glycol 3350 17 Gram(s) Oral daily  senna 2 Tablet(s) Oral at bedtime  sertraline 100 milliGRAM(s) Oral daily    MEDICATIONS  (PRN):  acetaminophen   Tablet .. 650 milliGRAM(s) Oral every 6 hours PRN Temp greater or equal to 38C (100.4F), Moderate Pain (4 - 6)      ICU Vital Signs Last 24 Hrs  T(C): 36.7 (29 May 2019 05:22), Max: 36.7 (28 May 2019 13:59)  T(F): 98.1 (29 May 2019 05:22), Max: 98.1 (28 May 2019 13:59)  HR: 116 (29 May 2019 05:38) (76 - 130)  BP: 139/67 (29 May 2019 05:38) (136/64 - 170/91)  BP(mean): --  ABP: --  ABP(mean): --  RR: 20 (29 May 2019 05:38) (20 - 20)  SpO2: 93% (29 May 2019 05:38) (91% - 95%)        Lungs clear  Cv s1 s2  irreg   abd soft  ext stable                            12.4   13.99 )-----------( 362      ( 29 May 2019 06:26 )             39.8   05-29    138  |  104  |  74<H>  ----------------------------<  163<H>  4.5   |  17<L>  |  2.83<H>    Ca    9.0      29 May 2019 06:26  Mg     2.5     05-29    TPro  7.5  /  Alb  2.8<L>  /  TBili  0.2  /  DBili  x   /  AST  22  /  ALT  27  /  AlkPhos  111  05-29          Culture - Blood (05.23.19 @ 01:47)    -  Amoxicillin/Clavulanic Acid: S <=8/4    -  Ceftazidime: S <=1    Organism Identification: Blood Culture PCR  Escherichia coli

## 2019-05-29 NOTE — PROGRESS NOTE ADULT - PROBLEM SELECTOR PLAN 3
MANAN on CKD now improving. (baseline Crt 1.8-1.9, diabetic nephropathy)  - Renal service following, f/u recs  - Cr 3.67 on arrival s/p 3L IVF at [facility]  - Crt downtrending 2.86 on 5/28/19  - FeUrea 49.6 c/w intrinsic renal injury   - Hold home Enalapril and Lasix  - retroperitoneal US: B/L renal cysts measuring up to 5.5 cm on the right and 5.1cm on the left, negative for hydronephrosis sCr 3.67 on admission (baseline Crt 1.8-1.9, diabetic nephropathy). FEUREA 49.6% suggestive of intrarenal  - Crt downtrending 2.8 now   - Hold home Enalapril and Lasix  - retroperitoneal US: B/L renal cysts measuring up to 5.5 cm on the right and 5.1cm on the left, negative for hydronephrosis

## 2019-05-29 NOTE — PROGRESS NOTE ADULT - PROBLEM SELECTOR PLAN 3
MANAN on CKD now improving. (baseline Crt 1.8-1.9, diabetic nephropathy)  - Renal service following, f/u recs  - Cr 3.67 on arrival s/p 3L IVF at [facility]  - Crt downtrending 2.83 on 5/27/19  - FeUrea 49.6 c/w intrinsic renal injury   - Hold home Enalapril and Lasix  - retroperitoneal US: B/L renal cysts measuring up to 5.5 cm on the right and 5.1cm on the left, negative for hydronephrosis

## 2019-05-29 NOTE — PROGRESS NOTE ADULT - PROBLEM SELECTOR PLAN 1
Sepsis secondary to urinary tract infection, now resolved  - Continue leukocytosis 14.90, afebrile.    - urinary tract infection c/b gram negative bacteremia.  - CXR without focal consolidation.   - Initial blood cultures growing E. coli susceptible to Ceftriaxone, Surveillance blood Cx negative to date.   - UCx NGTD however this was on antibiotics.   - CT abd w/o contrast w/ minimal hydronephrosis w/ perinephric stranding, findings with possible pyelonephritis or recently passed kidney stone.  - US retroperitoneal negative for hydronephrosis  - continue CTX 2g q24h for coverage of bacteremia, will require two week course of appropriate antibiotic therapy from last set of negative cultures (through 6/7)  - per renal holding oxybutynin as can increase risk of UTI.  - ID service consulted and gave clearance for JAROD/ablation. Reconsult with further questions.

## 2019-05-29 NOTE — PROGRESS NOTE ADULT - PROBLEM SELECTOR PLAN 7
A1c 7.3  -continue Lantus 10 units qhs and ISS  - Patient with c/o persistent b/l leg pain from knee to feet, pain of feet with standing. c/w possible neuropathy.  Home Gabapentin increased to 300mg TID.  Continue to monitor.    VTE ppx: on Eliquis  Dispo: Continue IV Abx for Bacteremia.  EP consulted for Aflutter management.  PT Recs ALEYDA but patient refusing. A1c 7.3  -continue Lantus 10 units qhs and ISS  - Patient with c/o persistent b/l leg pain from knee to feet, pain of feet with standing. c/w possible neuropathy.  Home Gabapentin increased to 300mg TID.  Continue to monitor.    #Depression  - continue Sertraline 100mg qd     #Overactive bladder   - holding home oxybutynin 5mg qd given UTI

## 2019-05-29 NOTE — PROGRESS NOTE ADULT - ASSESSMENT
78M with h/o IDDM, HTN and OA who was BIBA to [facility] for generalized weakness, found to have sepsis 2/2 E. coli bacteremia 2/2 UTI admitted to [facility] and found to have new onset atrial flutter, s/p JAROD and ablation of A-flutter on 5/29, stepped up to CCU for further management.

## 2019-05-29 NOTE — PROGRESS NOTE ADULT - PROBLEM SELECTOR PLAN 6
Trop 0.07 --> 0.08, asymptomatic, EKG with no acute ST/T-wave changes. Likely secondary to demand ischemia in setting of sepsis/bacteremia  - hx of non obstructive, follows up with Dr. Neil  - continue metoprolol 25mg BID and Atorvastatin 10mg (pravastatin @ home) Trop 0.07 --> 0.08, asymptomatic, EKG with no acute ST/T-wave changes. Likely secondary to demand ischemia in setting of sepsis/bacteremia  - hx of non obstructive, follows up with Dr. Neil  - continue metoprolol 25mg BID and Atorvastatin 10mg (pravastatin @ home)    #OA  Has chronic OA of bilateral knees, uses cane for ambulation. States his ambulation has been deteriorating with worsening pain. Takes tylenol/motrin daily for pain control. Xrays inpatient c/w OA  -tylenol PRN  -PT recommending ALEYDA but pt refusing

## 2019-05-29 NOTE — PROGRESS NOTE ADULT - SUBJECTIVE AND OBJECTIVE BOX
Interventional Cardiology PA Adult Progress Note    CC: generalized weakness.  Subjective Assessment:    Weakness improving    ROS otherwise negative except as stated in HPI and subjective. 	  MEDICATIONS:  metoprolol tartrate 25 milliGRAM(s) Oral two times a day  cefTRIAXone   IVPB 2 Gram(s) IV Intermittent every 24 hours  acetaminophen   Tablet .. 650 milliGRAM(s) Oral every 6 hours PRN  gabapentin 300 milliGRAM(s) Oral three times a day  sertraline 100 milliGRAM(s) Oral daily  docusate sodium 100 milliGRAM(s) Oral two times a day  polyethylene glycol 3350 17 Gram(s) Oral daily  senna 2 Tablet(s) Oral at bedtime  atorvastatin 10 milliGRAM(s) Oral at bedtime  insulin glargine Injectable (LANTUS) 10 Unit(s) SubCutaneous at bedtime  insulin lispro (HumaLOG) corrective regimen sliding scale   SubCutaneous Before meals and at bedtime  apixaban 5 milliGRAM(s) Oral every 12 hours  	  PHYSICAL EXAM:  TELEMETRY: intermittent rapid aflutter  T(C): 36.7 (05-29-19 @ 14:01), Max: 36.8 (05-29-19 @ 09:24)  HR: 72 (05-29-19 @ 16:37) (72 - 116)  BP: 126/57 (05-29-19 @ 16:37) (121/74 - 170/91)  RR: 16 (05-29-19 @ 16:37) (16 - 20)  SpO2: 93% (05-29-19 @ 16:37) (93% - 96%)  Wt(kg): --  I&O's Summary    28 May 2019 07:01  -  29 May 2019 07:00  --------------------------------------------------------  IN: 240 mL / OUT: 726 mL / NET: -486 mL    29 May 2019 07:01  -  29 May 2019 18:15  --------------------------------------------------------  IN: 0 mL / OUT: 300 mL / NET: -300 mL        Beckham: none  Central/PICC/Mid Line: none                                         Appearance: Normal	  HEENT:   Normal oral mucosa, PERRL, EOMI	  Neck: Supple,  - JVD  Cardiovascular: Normal S1 S2, No JVD, No murmurs,   Respiratory: Lungs clear to auscultation b/l, No Rales, Rhonchi, Wheezing	  Gastrointestinal:  Soft, Non-tender, + BS	  Skin: No rashes, No ecchymoses, No cyanosis  Extremities: Normal range of motion, No clubbing, cyanosis or edema  Vascular: Peripheral pulses palpable 2+ bilaterally  Neurologic: Non-focal  Psychiatry: A & O x 3, Mood & affect appropriate    LABS                          12.4   13.99 )-----------( 362      ( 29 May 2019 06:26 )             39.8     05-29    138  |  104  |  74<H>  ----------------------------<  163<H>  4.5   |  17<L>  |  2.83<H>    Ca    9.0      29 May 2019 06:26  Mg     2.5     05-29    TPro  7.5  /  Alb  2.8<L>  /  TBili  0.2  /  DBili  x   /  AST  22  /  ALT  27  /  AlkPhos  111  05-29    PT/INR - ( 29 May 2019 06:26 )   PT: 17.4 sec;   INR: 1.52          PTT - ( 29 May 2019 06:26 )  PTT:34.1 sec    ASSESSMENT/PLAN:

## 2019-05-29 NOTE — CHART NOTE - NSCHARTNOTEFT_GEN_A_CORE
Admitting Diagnosis:   Patient is a 78y old  Male who presents with a chief complaint of Generalized weakness (29 May 2019 08:36)      PAST MEDICAL & SURGICAL HISTORY:  OA (osteoarthritis)  HTN (hypertension)  IDDM (insulin dependent diabetes mellitus)  S/P cholecystectomy  S/P appendectomy  H/O knee surgery      Current Nutrition Order: DASH/TLC, CSTCHO diet   NPOpMN  for JAROD/Ablation today     PO Intake: Good (%) [ X ]  Fair (50-75%) [   ] Poor (<25%) [   ]    GI Issues: Denies N/V/D/C   +BM , (Colace, Miralax, Senna)    Pain: Denies pain/discomfort     Skin Integrity: intact     Labs:       138  |  104  |  74<H>  ----------------------------<  163<H>  4.5   |  17<L>  |  2.83<H>    Ca    9.0      29 May 2019 06:26  Mg     2.5         TPro  7.5  /  Alb  2.8<L>  /  TBili  0.2  /  DBili  x   /  AST  22  /  ALT  27  /  AlkPhos  111      CAPILLARY BLOOD GLUCOSE      POCT Blood Glucose.: 142 mg/dL (29 May 2019 16:14)  POCT Blood Glucose.: 146 mg/dL (29 May 2019 13:15)  POCT Blood Glucose.: 141 mg/dL (29 May 2019 09:23)  POCT Blood Glucose.: 164 mg/dL (29 May 2019 06:52)  POCT Blood Glucose.: 185 mg/dL (28 May 2019 21:46)  POCT Blood Glucose.: 223 mg/dL (28 May 2019 16:22)      Medications:  MEDICATIONS  (STANDING):  apixaban 5 milliGRAM(s) Oral every 12 hours  atorvastatin 10 milliGRAM(s) Oral at bedtime  cefTRIAXone   IVPB 2 Gram(s) IV Intermittent every 24 hours  docusate sodium 100 milliGRAM(s) Oral two times a day  gabapentin 300 milliGRAM(s) Oral three times a day  insulin glargine Injectable (LANTUS) 10 Unit(s) SubCutaneous at bedtime  insulin lispro (HumaLOG) corrective regimen sliding scale   SubCutaneous Before meals and at bedtime  metoprolol tartrate 25 milliGRAM(s) Oral two times a day  polyethylene glycol 3350 17 Gram(s) Oral daily  senna 2 Tablet(s) Oral at bedtime  sertraline 100 milliGRAM(s) Oral daily    MEDICATIONS  (PRN):  acetaminophen   Tablet .. 650 milliGRAM(s) Oral every 6 hours PRN Temp greater or equal to 38C (100.4F), Moderate Pain (4 - 6)      New Weight:  90.8 kg ()     Weight Change: Will continue to monitor weight trends.     Estimated energy needs:   Ht:5ft 7inches,IBW:148lbs +/-10%,149% of IBW/BMI:34.5.  IBW used to calculate EER as per pt's current body weight >120% of IBW   Estimated nutrient needs based on [facility] SOC for maintenance in older adults  20-25 kcal/k5071-8731 kcal/d  1.0-1.2 gm/k-80 gm protein/d  25-30 mL/k0632-4092 mL fluids/d    Subjective:   79 y/o M admitted with fevers/LHGV with generalized weakness and found to have sepsis 2/2 E. coli bacteremia 2/2 UTI. Transferred to [facility] for further management of Aflutter. NPO for JAROD/Ablation with EP. Of note, Pt with poor to fair adherence to diabetic diet therapy, but tries to avoid concentrated sweets and limit desserts. Seen pt in room, tolerating diet well, endorses good appetite. Plan for midline placement tomorrow for IV abx. Discharge planning to [facility] in progress by SW/CM. RD will f/u per moderate risk protocol.    Previous Nutrition Diagnosis:  Food - and nutrition - related knowledge deficit; Disordered eating pattern; Limited adherence to nutrition - related recommendations R/T suspected no formal MNT education AEB: diet recall and HGBA1C    Active [ X ]  Resolved [   ]    If resolved, new PES:   no new nutrition problems identified at this time     Goal:  Meet >75% EER from PO without any intolerances  Nutrition related labs WNL (BUN/Cr 74/2.83 H, GFR 21 L, hyperphosphatemia 5.6 H)  Improve glycemic control; fasting BG  mg/dL, post-prandial BG <180 mg/dL   Teach back diet modifications for diet therapy     Recommendations:  1. Continue current diet orders as tolerated   2. Consider Phos dietary restrictions 2/2 hyperphosphatemia     Education: reviewed diet therapy with pt    Risk Level: High [   ] Moderate [ X ] Low [   ]

## 2019-05-29 NOTE — PROGRESS NOTE ADULT - PROBLEM SELECTOR PLAN 1
New Aflutter.  HR improves to 90s, on Tele can bump to 120s.   - Continue lopressor to 25mg BID   - started on Eliquis 2.5mg BID but does not meet two criteria so increased to Eliquis 5mg Po BID.   - EP service consulted. NPO after midnight for JAROD/Ablation on 5/29/19 New Aflutter during admission, 2 to 1. HR improves to 90s, on Tele can bump to 120s. s/p JAROD and ablation of A-flutter on 5/29.  -f/u JAROD  -c/w lopressor to 25mg BID   - c/w Eliquis 5mg Po BID. New Aflutter during admission, 2 to 1. HR improves to 90s, on Tele can bump to 120s. s/p JAROD and ablation of A-flutter on 5/29.  -JAROD with mild LVH, global LV hypokinesis, dilated LA, mild-mod MR, moderate plaque in aorta  -c/w lopressor to 25mg BID   - c/w Eliquis 5mg Po BID.  -CTM New Aflutter during admission, 2 to 1. HR improves to 90s, on Tele can bump to 120s. s/p JAROD and ablation of A-flutter on 5/29.  -JAROD with mild LVH, global LV hypokinesis, dilated LA, mild-mod MR, moderate plaque in aorta  -c/w lopressor to 25mg BID   - c/w Eliquis 5mg Po BID.  -CTM      #Respiratory distress - Likely 2/2 to pulmonary vascular congestion in the setting of receiving 1.3L NS exacerbated by cardiac insufficiency.   -- F/U VBG   -- 40 IV lasix for now   -- C/w facemasrk   -- strict ins and outs  -- F/U CXR

## 2019-05-29 NOTE — PROGRESS NOTE ADULT - PROBLEM SELECTOR PLAN 7
A1c 7.3  -continue Lantus 10 units qhs and ISS  - Patient with c/o persistent b/l leg pain from knee to feet, pain of feet with standing. c/w possible neuropathy.  Home Gabapentin increased to 300mg TID.  Continue to monitor.    VTE ppx: on Eliquis  Dispo: Continue IV Abx for Bacteremia.  EP consulted for Aflutter management.  PT recs [facility] and pt agreed on 5/28/19

## 2019-05-30 LAB
ANION GAP SERPL CALC-SCNC: 17 MMOL/L — SIGNIFICANT CHANGE UP (ref 5–17)
BUN SERPL-MCNC: 86 MG/DL — HIGH (ref 7–23)
CALCIUM SERPL-MCNC: 9.3 MG/DL — SIGNIFICANT CHANGE UP (ref 8.4–10.5)
CHLORIDE SERPL-SCNC: 100 MMOL/L — SIGNIFICANT CHANGE UP (ref 96–108)
CO2 SERPL-SCNC: 22 MMOL/L — SIGNIFICANT CHANGE UP (ref 22–31)
CREAT ?TM UR-MCNC: 37 MG/DL — SIGNIFICANT CHANGE UP
CREAT SERPL-MCNC: 3.14 MG/DL — HIGH (ref 0.5–1.3)
EOSINOPHIL NFR BLD AUTO: 3 % — SIGNIFICANT CHANGE UP (ref 0–6)
GLUCOSE BLDC GLUCOMTR-MCNC: 163 MG/DL — HIGH (ref 70–99)
GLUCOSE BLDC GLUCOMTR-MCNC: 177 MG/DL — HIGH (ref 70–99)
GLUCOSE BLDC GLUCOMTR-MCNC: 184 MG/DL — HIGH (ref 70–99)
GLUCOSE BLDC GLUCOMTR-MCNC: 226 MG/DL — HIGH (ref 70–99)
GLUCOSE BLDC GLUCOMTR-MCNC: 298 MG/DL — HIGH (ref 70–99)
GLUCOSE SERPL-MCNC: 157 MG/DL — HIGH (ref 70–99)
HCT VFR BLD CALC: 39.7 % — SIGNIFICANT CHANGE UP (ref 39–50)
HGB BLD-MCNC: 12.1 G/DL — LOW (ref 13–17)
LYMPHOCYTES # BLD AUTO: 13 % — SIGNIFICANT CHANGE UP (ref 13–44)
MAGNESIUM SERPL-MCNC: 2.6 MG/DL — SIGNIFICANT CHANGE UP (ref 1.6–2.6)
MCHC RBC-ENTMCNC: 26 PG — LOW (ref 27–34)
MCHC RBC-ENTMCNC: 30.5 GM/DL — LOW (ref 32–36)
MCV RBC AUTO: 85.4 FL — SIGNIFICANT CHANGE UP (ref 80–100)
MONOCYTES NFR BLD AUTO: 6 % — SIGNIFICANT CHANGE UP (ref 2–14)
NEUTROPHILS NFR BLD AUTO: 74 % — SIGNIFICANT CHANGE UP (ref 43–77)
PLATELET # BLD AUTO: 383 K/UL — SIGNIFICANT CHANGE UP (ref 150–400)
POTASSIUM SERPL-MCNC: 4.7 MMOL/L — SIGNIFICANT CHANGE UP (ref 3.5–5.3)
POTASSIUM SERPL-SCNC: 4.7 MMOL/L — SIGNIFICANT CHANGE UP (ref 3.5–5.3)
RBC # BLD: 4.65 M/UL — SIGNIFICANT CHANGE UP (ref 4.2–5.8)
RBC # FLD: 15.3 % — HIGH (ref 10.3–14.5)
SODIUM SERPL-SCNC: 139 MMOL/L — SIGNIFICANT CHANGE UP (ref 135–145)
UUN UR-MCNC: 367 MG/DL — SIGNIFICANT CHANGE UP
WBC # BLD: 11.44 K/UL — HIGH (ref 3.8–10.5)
WBC # FLD AUTO: 11.44 K/UL — HIGH (ref 3.8–10.5)

## 2019-05-30 PROCEDURE — 93010 ELECTROCARDIOGRAM REPORT: CPT

## 2019-05-30 PROCEDURE — 71045 X-RAY EXAM CHEST 1 VIEW: CPT | Mod: 26

## 2019-05-30 PROCEDURE — 99233 SBSQ HOSP IP/OBS HIGH 50: CPT | Mod: GC

## 2019-05-30 PROCEDURE — 99291 CRITICAL CARE FIRST HOUR: CPT

## 2019-05-30 RX ORDER — INSULIN LISPRO 100/ML
2 VIAL (ML) SUBCUTANEOUS
Refills: 0 | Status: DISCONTINUED | OUTPATIENT
Start: 2019-05-30 | End: 2019-05-31

## 2019-05-30 RX ORDER — INSULIN GLARGINE 100 [IU]/ML
12 INJECTION, SOLUTION SUBCUTANEOUS AT BEDTIME
Refills: 0 | Status: DISCONTINUED | OUTPATIENT
Start: 2019-05-30 | End: 2019-05-30

## 2019-05-30 RX ORDER — CEFTRIAXONE 500 MG/1
2 INJECTION, POWDER, FOR SOLUTION INTRAMUSCULAR; INTRAVENOUS EVERY 24 HOURS
Refills: 0 | Status: DISCONTINUED | OUTPATIENT
Start: 2019-05-31 | End: 2019-05-31

## 2019-05-30 RX ORDER — INSULIN GLARGINE 100 [IU]/ML
14 INJECTION, SOLUTION SUBCUTANEOUS AT BEDTIME
Refills: 0 | Status: DISCONTINUED | OUTPATIENT
Start: 2019-05-30 | End: 2019-05-31

## 2019-05-30 RX ADMIN — ATORVASTATIN CALCIUM 10 MILLIGRAM(S): 80 TABLET, FILM COATED ORAL at 21:06

## 2019-05-30 RX ADMIN — POLYETHYLENE GLYCOL 3350 17 GRAM(S): 17 POWDER, FOR SOLUTION ORAL at 11:28

## 2019-05-30 RX ADMIN — Medication 100 MILLIGRAM(S): at 05:45

## 2019-05-30 RX ADMIN — Medication 650 MILLIGRAM(S): at 18:18

## 2019-05-30 RX ADMIN — Medication 4: at 21:36

## 2019-05-30 RX ADMIN — APIXABAN 5 MILLIGRAM(S): 2.5 TABLET, FILM COATED ORAL at 00:46

## 2019-05-30 RX ADMIN — APIXABAN 5 MILLIGRAM(S): 2.5 TABLET, FILM COATED ORAL at 14:46

## 2019-05-30 RX ADMIN — CEFTRIAXONE 100 GRAM(S): 500 INJECTION, POWDER, FOR SOLUTION INTRAMUSCULAR; INTRAVENOUS at 05:45

## 2019-05-30 RX ADMIN — INSULIN GLARGINE 14 UNIT(S): 100 INJECTION, SOLUTION SUBCUTANEOUS at 21:52

## 2019-05-30 RX ADMIN — GABAPENTIN 300 MILLIGRAM(S): 400 CAPSULE ORAL at 14:46

## 2019-05-30 RX ADMIN — SERTRALINE 100 MILLIGRAM(S): 25 TABLET, FILM COATED ORAL at 11:28

## 2019-05-30 RX ADMIN — Medication 2 UNIT(S): at 16:37

## 2019-05-30 RX ADMIN — Medication 2: at 16:33

## 2019-05-30 RX ADMIN — GABAPENTIN 300 MILLIGRAM(S): 400 CAPSULE ORAL at 05:45

## 2019-05-30 RX ADMIN — Medication 6: at 11:02

## 2019-05-30 RX ADMIN — GABAPENTIN 300 MILLIGRAM(S): 400 CAPSULE ORAL at 21:06

## 2019-05-30 RX ADMIN — Medication 25 MILLIGRAM(S): at 00:45

## 2019-05-30 RX ADMIN — Medication 25 MILLIGRAM(S): at 10:40

## 2019-05-30 RX ADMIN — Medication 25 MILLIGRAM(S): at 21:09

## 2019-05-30 RX ADMIN — INSULIN GLARGINE 10 UNIT(S): 100 INJECTION, SOLUTION SUBCUTANEOUS at 00:02

## 2019-05-30 RX ADMIN — Medication 2: at 07:00

## 2019-05-30 NOTE — PROGRESS NOTE ADULT - PROBLEM SELECTOR PLAN 6
Trop 0.07 --> 0.08, asymptomatic, EKG with no acute ST/T-wave changes. Likely secondary to demand ischemia in setting of sepsis/bacteremia  - hx of non obstructive, follows up with Dr. Neil  - continue metoprolol 25mg BID and Atorvastatin 10mg (pravastatin @ home)    #OA  Has chronic OA of bilateral knees, uses cane for ambulation. States his ambulation has been deteriorating with worsening pain. Takes tylenol/motrin daily for pain control. Xrays inpatient c/w OA  -tylenol PRN  -PT recommending [facility] but pt refusing. F/u PT re-evaluation

## 2019-05-30 NOTE — PROGRESS NOTE ADULT - ASSESSMENT
78M with h/o IDDM, HTN and OA who was BIBA to [facility] for generalized weakness, found to have sepsis 2/2 E. coli bacteremia 2/2 UTI, admission c/b new onset atrial flutter, s/p JAROD and ablation of A-flutter on 5/29, now in sinus rhythm with frequent PAC.     Problem/Plan - 1:  ·  Problem: Atrial flutter.  Plan: New Aflutter during admission, 2 to 1. HR improves to 90s, on Tele can bump to 120s. s/p JAROD and ablation of A-flutter on 5/29.  -RESOLVED pt now in sinus rhythm with frequent PAC.   -JAROD with mild LVH, global LV hypokinesis, dilated LA, mild-mod MR, moderate plaque in aorta  -c/w lopressor to 25mg BID   - c/w Eliquis 5mg Po BID.  - daily EKGs    #Respiratory distress - Pt with respiratory distress upon arrival to CCU s/p ablation. Likely 2/2 to pulmonary vascular congestion in the setting of receiving 1.3L NS during procedure exacerbated by cardiac insufficiency. Given 40mg IV lasix  -RESOLVED  -now comfortable on 2L NC   - CXR with some pulmonary vascular congestion  - will continue to monitor respiratory status and assess if needs standing PO lasix; for now holding off on further diuresis as creatinine 3.     Problem/Plan - 2:  ·  Problem: Sepsis.  Plan: Sepsis (Tm 100.7, WBC 15 on admission) secondary to urinary tract infection. s/p 3L NS in ED, reperfusion assessment completed. c/b bacteremia with Ecoli, however urine cx negative (pt already received abx), surveillance blood cx 5/24 NGTD. CXR without focal consolidation.   -c/w CTX 2g QD (day 8/14 until 6/7)  - Pt afebrile, however w/ persistent leukocytosis (downtrending at 11.4 today 5/30)  - CT abd w/o contrast w/ minimal hydronephrosis w/ perinephric stranding, findings with possible pyelonephritis or recently passed kidney stone.  - US retroperitoneal negative for hydronephrosis  - per renal holding oxybutynin as can increase risk of UTI.    #Ecoli bacteremia 2/2 UTI  -plan as above.     Problem/Plan - 3:  ·  Problem: Acute kidney injury superimposed on CKD.  Plan: sCr 3.67 on admission (baseline Creat in February 2018 was 1.8 as per PCP records) FEUREA 49.6% (36.2% today) suggestive of intrarenal  - Crt downtrending, 3.14 today  - Hold home Enalapril and Lasix  - retroperitoneal US: B/L renal cysts measuring up to 5.5 cm on the right and 5.1cm on the left, negative for hydronephrosis  - 24 hour urine protein collection pending  - bladder scans routinely throughout the day   - appreciate renal recs.     Problem/Plan - 4:  ·  Problem: Acute systolic congestive heart failure.  Plan: Pt denies hx of HF but on Furosemide 80mg QD at home. On admission BNP 3296 (received fluid while @ [facility]).   - remains euvolemic on exam. denies SOB, no edema.   - CXR on admission: Mild pulmonary venous congestion  - Echo: moderate global hypokinesis, concentric LVH, EF 35%  -JAROD with mild LVH, global LV hypokinesis, dilated LA, mild-mod MR, moderate plaque in aorta  - s/p 40 Lasix on 5/29 in setting of volume overload; currently euvolemic and CXR showing improvement of effusions  - Holding Lasix and lisinopril for now given MANAN.     Problem/Plan - 5:  ·  Problem: HTN (hypertension).  Plan: BP remains stable.  -150s  -holding home amlodipine 10mg and enalapril 10mg in setting of sepsis  -continue metoprolol 25mg BID  - Continue to monitor and consider resuming home meds.     Problem/Plan - 6:  Problem: CAD (coronary artery disease). Plan: Trop 0.07 --> 0.08, asymptomatic, EKG with no acute ST/T-wave changes. Likely secondary to demand ischemia in setting of sepsis/bacteremia  - hx of non obstructive, follows up with Dr. Neil  - continue metoprolol 25mg BID and Atorvastatin 10mg (pravastatin @ home)    #OA  Has chronic OA of bilateral knees, uses cane for ambulation. States his ambulation has been deteriorating with worsening pain. Takes tylenol/motrin daily for pain control. Xrays inpatient c/w OA  -tylenol PRN      Problem/Plan - 7:  ·  Problem: IDDM (insulin dependent diabetes mellitus).  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   -  Home Gabapentin increased to 300mg TID, however pt still with numbness, no pain  -  Continue to monitor and consider increasing the dose or adding second agent    #Depression  - continue Sertraline 100mg qd     #Overactive bladder   - holding home oxybutynin 5mg qd given UTI.     Problem/Plan - 8:  ·  Problem: Nutrition, metabolism, and development symptoms.  Plan: F: none  E: replete PRN  N: dash/tlc, CC.

## 2019-05-30 NOTE — PROGRESS NOTE ADULT - ASSESSMENT
78M with h/o IDDM, HTN and OA who was BIBA to [facility] for generalized weakness, found to have sepsis 2/2 E. coli bacteremia 2/2 UTI admitted to [facility] and found to have new onset atrial flutter, s/p JAROD and ablation of A-flutter on 5/29, stepped up to CCU for further management. 78M with h/o IDDM, HTN and OA who was BIBA to [facility] for generalized weakness, found to have sepsis 2/2 E. coli bacteremia 2/2 UTI, admission c/b new onset atrial flutter, s/p JAROD and ablation of A-flutter on 5/29, now in sinus rhythm with frequent PAC.

## 2019-05-30 NOTE — PROGRESS NOTE ADULT - PROBLEM SELECTOR PLAN 3
sCr 3.67 on admission (baseline Crt 1.8-1.9, diabetic nephropathy). FEUREA 49.6% (36.2% today) suggestive of intrarenal  - Crt downtrending, 3.14 today  - Hold home Enalapril and Lasix  - retroperitoneal US: B/L renal cysts measuring up to 5.5 cm on the right and 5.1cm on the left, negative for hydronephrosis sCr 3.67 on admission (baseline Crt in February 2018 was 1.8-1.9, diabetic nephropathy). FEUREA 49.6% (36.2% today) suggestive of intrarenal  - Crt downtrending, 3.14 today  - Hold home Enalapril and Lasix  - retroperitoneal US: B/L renal cysts measuring up to 5.5 cm on the right and 5.1cm on the left, negative for hydronephrosis  - 24 hour urine protein collection pending  - bladder scans chronically   - appreciate renal recs sCr 3.67 on admission (baseline Creat in February 2018 was 1.8 as per PCP records) FEUREA 49.6% (36.2% today) suggestive of intrarenal  - Crt downtrending, 3.14 today  - Hold home Enalapril and Lasix  - retroperitoneal US: B/L renal cysts measuring up to 5.5 cm on the right and 5.1cm on the left, negative for hydronephrosis  - 24 hour urine protein collection pending  - bladder scans routinely throughout the day   - appreciate renal recs

## 2019-05-30 NOTE — PROGRESS NOTE ADULT - PROBLEM SELECTOR PLAN 1
New Aflutter during admission, 2 to 1. HR improves to 90s, on Tele can bump to 120s. s/p JAROD and ablation of A-flutter on 5/29.  -RESOLVED pt now in sinus rhythm with frequent PAC.   -JAROD with mild LVH, global LV hypokinesis, dilated LA, mild-mod MR, moderate plaque in aorta  -c/w lopressor to 25mg BID   - c/w Eliquis 5mg Po BID.  -CTM daily EKG    #Respiratory distress - Pt with respiratory distress upon arrival to CCU s/p ablation. Likely 2/2 to pulmonary vascular congestion in the setting of receiving 1.3L NS during procedure, exacerbated by cardiac insufficiency. Given 40 lasix IVP in CCU  -RESOLVED  -now comfortable on O2 NC  - CXR with some pulmonary vascular congestion  -CTM respiratory status New Aflutter during admission, 2 to 1. HR improves to 90s, on Tele can bump to 120s. s/p JAROD and ablation of A-flutter on 5/29.  -RESOLVED pt now in sinus rhythm with frequent PAC.   -JAROD with mild LVH, global LV hypokinesis, dilated LA, mild-mod MR, moderate plaque in aorta  -c/w lopressor to 25mg BID   - c/w Eliquis 5mg Po BID.  - daily EKGs    #Respiratory distress - Pt with respiratory distress upon arrival to CCU s/p ablation. Likely 2/2 to pulmonary vascular congestion in the setting of receiving 1.3L NS during procedure exacerbated by cardiac insufficiency. Given 40mg IV lasix  -RESOLVED  -now comfortable on 2L NC   - CXR with some pulmonary vascular congestion  - will continue to monitor respiratory status and assess if needs standing PO lasix; for now holding off on further diuresis as creatinine 3.

## 2019-05-30 NOTE — PROGRESS NOTE ADULT - PROBLEM SELECTOR PLAN 2
Sepsis (Tm 100.7, WBC 15 on admission) secondary to urinary tract infection. s/p 3L NS in ED, reperfusion assessment completed. c/b bacteremia with Ecoli, however urine cx negative (pt already received abx), surveillance blood cx 5/24 NGTD. CXR without focal consolidation.   -c/w CTX 2g QD (day 7/14 until 6/7)  - Pt afebrile, however w/ persistent leukocytosis   - CT abd w/o contrast w/ minimal hydronephrosis w/ perinephric stranding, findings with possible pyelonephritis or recently passed kidney stone.  - US retroperitoneal negative for hydronephrosis  - per renal holding oxybutynin as can increase risk of UTI.  - ID service consulted and gave clearance for JAROD/ablation    #Ecoli bacteremia 2/2 UTI  -plan as above Sepsis (Tm 100.7, WBC 15 on admission) secondary to urinary tract infection. s/p 3L NS in ED, reperfusion assessment completed. c/b bacteremia with Ecoli, however urine cx negative (pt already received abx), surveillance blood cx 5/24 NGTD. CXR without focal consolidation.   -c/w CTX 2g QD (day 8/14 until 6/7)  - Pt afebrile, however w/ persistent leukocytosis (downtrending)  - CT abd w/o contrast w/ minimal hydronephrosis w/ perinephric stranding, findings with possible pyelonephritis or recently passed kidney stone.  - US retroperitoneal negative for hydronephrosis  - per renal holding oxybutynin as can increase risk of UTI.    #Ecoli bacteremia 2/2 UTI  -plan as above

## 2019-05-30 NOTE — PROGRESS NOTE ADULT - PROBLEM SELECTOR PLAN 3
sCr 3.67 on admission (baseline Crt 1.8-1.9, diabetic nephropathy). FEUREA 49.6% suggestive of intrarenal  - Crt downtrending 2.8 now   - Hold home Enalapril and Lasix  - retroperitoneal US: B/L renal cysts measuring up to 5.5 cm on the right and 5.1cm on the left, negative for hydronephrosis sCr 3.67 on admission (baseline Crt 1.8-1.9, diabetic nephropathy). FEUREA 49.6% (36.2% today) suggestive of intrarenal  - Crt downtrending, 3.14 today  - Hold home Enalapril and Lasix  - retroperitoneal US: B/L renal cysts measuring up to 5.5 cm on the right and 5.1cm on the left, negative for hydronephrosis

## 2019-05-30 NOTE — PROVIDER CONTACT NOTE (OTHER) - ASSESSMENT
PT is tachy to the 130's  denies chest pains/SOB  rectal temp 101.2
Neurological assessment as per flow sheet- includes orientated to self only, GCS 14. Bladder scan 520mls. Condom catheter in situ. Vitals- oxygen saturation 96% on 4L O2 via nasal prongs, other vitals within safe limits. Patient denies pain or shortness of breath. No increased work of breathing observed.
Unable to administer oral medications at 2200hrs as prescribed as patient is drowsy, lying flat post ablation and have not yet carried out swallow evaluation post extubation.

## 2019-05-30 NOTE — PROGRESS NOTE ADULT - PROBLEM SELECTOR PLAN 4
Pt denies hx of HF but on Furosemide 80mg QD at home. On admission BNP 3296 (received fluid while @ [facility]).   - remains euvolemic on exam. denies SOB, no edema.   - CXR on admission: Mild pulmonary venous congestion  - Echo: moderate global hypokinesis, concentric LVH, EF 35%  -JAROD with mild LVH, global LV hypokinesis, dilated LA, mild-mod MR, moderate plaque in aorta  - Holding Lasix and lisinopril for now given MANAN.

## 2019-05-30 NOTE — PROVIDER CONTACT NOTE (OTHER) - RECOMMENDATIONS
tylenol Po given   500cc bolus NS given   ice packs placed under B/L arm  continue to monitor   VSS Q 4hrs  urine cultures
Not given medications as per eMAR, metoprolol rescheduled to 0100hrs, reevaluate at 0100hrs post end of bedrest order and after swallow eval.
Requested MD to review patient, inform RN of plan of care.

## 2019-05-30 NOTE — PROGRESS NOTE ADULT - SUBJECTIVE AND OBJECTIVE BOX
Seen in the morning in his bed in ICU (in the ICU after the procedure yesterday - ablation - difficult intubation for monitoring),  no shortness of breath, no chest pain, no fever, making urine - 1.1 liters     The renal follows the case for MANAN on CKD ?? - not well documented previous renal function   Now treated for UTI     Patient seen and examined at bedside.     acetaminophen   Tablet .. 650 milliGRAM(s) every 6 hours PRN  apixaban 5 milliGRAM(s) every 12 hours  atorvastatin 10 milliGRAM(s) at bedtime  cefTRIAXone   IVPB 2 Gram(s) every 24 hours  docusate sodium 100 milliGRAM(s) two times a day  gabapentin 300 milliGRAM(s) three times a day  insulin glargine Injectable (LANTUS) 10 Unit(s) at bedtime  insulin lispro (HumaLOG) corrective regimen sliding scale   Before meals and at bedtime  metoprolol tartrate 25 milliGRAM(s) two times a day  polyethylene glycol 3350 17 Gram(s) daily  senna 2 Tablet(s) at bedtime  sertraline 100 milliGRAM(s) daily      Allergies    No Known Allergies    Intolerances        T(C): , Max: 36.8 (05-30-19 @ 09:00)  T(F): , Max: 98.2 (05-30-19 @ 09:00)  HR: 60 (05-30-19 @ 10:00)  BP: 131/56 (05-30-19 @ 10:00)  BP(mean): 73 (05-30-19 @ 10:00)  RR: 20 (05-30-19 @ 10:00)  SpO2: 95% (05-30-19 @ 10:00)  Wt(kg): --    05-29 @ 07:01  -  05-30 @ 07:00  --------------------------------------------------------  IN:    IV PiggyBack: 100 mL    Oral Fluid: 674 mL  Total IN: 774 mL    OUT:    Incontinent per Condom Catheter: 1100 mL    Voided: 600 mL  Total OUT: 1700 mL    Total NET: -926 mL        Physical exam:   Alert and oriented   No JVD   Normal air entry into the lungs, no wheezing, no crackles   RRR, normal s1/s2, no murmurs, rubs or gallops   Abdomen - soft, not tender, not distended   Extremities: no edema           LABS:                        12.1   11.44 )-----------( 383      ( 30 May 2019 05:04 )             39.7     05-30    139  |  100  |  86<H>  ----------------------------<  157<H>  4.7   |  22  |  3.14<H>    Ca    9.3      30 May 2019 05:04  Mg     2.6     05-30    TPro  7.5  /  Alb  2.8<L>  /  TBili  0.2  /  DBili  x   /  AST  22  /  ALT  27  /  AlkPhos  111  05-29      PT/INR - ( 29 May 2019 06:26 )   PT: 17.4 sec;   INR: 1.52          PTT - ( 29 May 2019 06:26 )  PTT:34.1 sec    Creatinine, Random Urine: 37 mg/dL (05-30 @ 03:32)        RADIOLOGY & ADDITIONAL STUDIES:

## 2019-05-30 NOTE — PROGRESS NOTE ADULT - ASSESSMENT
78M with h/o IDDM, HTN, OA BIBA to [facility] for generalized weakness, found to have sepsis 2/2 UTI, now with E.coli bacteremia, being treated with Ceftriaxone (started 5/22 ). Also with A flutter and new onset CHF. The patient s/p ablation done on 5/29, in the ICU now - difficult intubation, currently extubated, no complaints, slightly worsening of the renal function - no clear cause, please make sure that he is not retaining urine       Martin vs CKD  unknown baseline cr  - initially worsening of the renal function in the setting of urosepsis - UTI with E.coli - treated with Cefitriaxone, possible worsening of the renal function from cardiorenal syndrome - A flutter and new onset CHF   -no with slightly worsening of the renal function creatinine up to 3.1   - volume status acceptable   - renal u/s no hydronephrosis   - bladder scan on daily bases please   - please obtain quantification of the proteinuria   - electrolytes noted   - in and outs   - daily weight     A flutter   - s/p ablation - 5/29   - on metorpolol   - on Apixaban     CHF   - treatement as per primary team   - EF 35%    Hypertension   - BP with fair control   - if consistently above systolic >160 mmHg - consider amlodipine 5 mg daily

## 2019-05-30 NOTE — PROGRESS NOTE ADULT - PROBLEM SELECTOR PLAN 9
VTE ppx: on Eliquis  GI: none  bowel: miralax, senna, colace    FULL CODE  Dispo: CCU. PT Recs [facility] but patient refusing. VTE ppx: on Eliquis  GI: none  bowel: miralax, senna, colace    FULL CODE  Dispo: telemetry. PT Recs [facility] but patient refusing. f/u PT re-evaluation

## 2019-05-30 NOTE — PROGRESS NOTE ADULT - PROBLEM SELECTOR PLAN 7
A1c 7.3  -continue Lantus 10 units qhs and ISS  - Patient with c/o persistent b/l leg pain from knee to feet, pain of feet with standing. c/w possible neuropathy.  Home Gabapentin increased to 300mg TID.  Continue to monitor.    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   -  Home Gabapentin increased to 300mg TID, however pt still with numbness, no pain  -  Continue to monitor and consider increasing the dose or adding second agent    #Depression  - continue Sertraline 100mg qd     #Overactive bladder   - holding home oxybutynin 5mg qd given UTI

## 2019-05-30 NOTE — PROGRESS NOTE ADULT - PROBLEM SELECTOR PLAN 7
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   -  Home Gabapentin increased to 300mg TID, however pt still with numbness, no pain  -  Continue to monitor and consider increasing the dose or adding second agent    #Depression  - continue Sertraline 100mg qd     #Overactive bladder   - holding home oxybutynin 5mg qd given UTI

## 2019-05-30 NOTE — PROGRESS NOTE ADULT - PROBLEM SELECTOR PLAN 2
Sepsis (Tm 100.7, WBC 15 on admission) secondary to urinary tract infection. s/p 3L NS in ED, reperfusion assessment completed. c/b bacteremia with Ecoli, however urine cx negative (pt already received abx), surveillance blood cx 5/24 NGTD. CXR without focal consolidation.   -c/w CTX 2g QD (day 8/14 until 6/7)  - Pt afebrile, however w/ persistent leukocytosis (downtrending)  - CT abd w/o contrast w/ minimal hydronephrosis w/ perinephric stranding, findings with possible pyelonephritis or recently passed kidney stone.  - US retroperitoneal negative for hydronephrosis  - per renal holding oxybutynin as can increase risk of UTI.    #Ecoli bacteremia 2/2 UTI  -plan as above Sepsis (Tm 100.7, WBC 15 on admission) secondary to urinary tract infection. s/p 3L NS in ED, reperfusion assessment completed. c/b bacteremia with Ecoli, however urine cx negative (pt already received abx), surveillance blood cx 5/24 NGTD. CXR without focal consolidation.   -c/w CTX 2g QD (day 8/14 until 6/7)  - Pt afebrile, however w/ persistent leukocytosis (downtrending at 11.4 today 5/30)  - CT abd w/o contrast w/ minimal hydronephrosis w/ perinephric stranding, findings with possible pyelonephritis or recently passed kidney stone.  - US retroperitoneal negative for hydronephrosis  - per renal holding oxybutynin as can increase risk of UTI.    #Ecoli bacteremia 2/2 UTI  -plan as above

## 2019-05-30 NOTE — PROGRESS NOTE ADULT - SUBJECTIVE AND OBJECTIVE BOX
Hospital course:   Patient is a 79yo M with a PMHx of DM, HTN, OA BIBA to [facility] for generalized weakness, increased urinary frequency and intermittent dysuria. Patient found to have sepsis secondary to E. coli bacteremia 2/2 UTI on admission, now resolving. Hospital course c/b the development of new onset atrial flutter and globally depressed EF, likely due to the concomitant sepsis. Patient started on Ceftriaxone 2g Q24H with resolution of sepsis and clearance of bacteremia. Started on anticoagulation and beta blockade for atrial flutter. Cardiology consulted for management of atrial flutter given resolving sepsis and decision made to transfer patient to cardiology service for telemetry monitoring and further management of atrial flutter. Pt had JAROD and flutter of Aflutter today, c/b mucous plugging In ET tube resolved with replacement of ET tube, now transferred to CCU for further management. Prior to transfer patient was extubated and received 1.3L NS Bolus. Upon arrival to CCU pt given 40lasix IVP.    Overnight: IVORY. EKG in sinus rhythm with PAC. Was noted to be slightly disoriented, AAOx1 on arrival, resolved overnight.     Subjective: Pt seen and examined at bedside. Complains of mild sore throat relieved by drinking cold water. Also notes that he has persistent tingling of b/l LE without pain. Denies headache, dizziness, CP, SOB, AP.     Objective:   ICU Vital Signs Last 24 Hrs  T(C): 36.5 (30 May 2019 05:58), Max: 36.8 (29 May 2019 09:24)  T(F): 97.7 (30 May 2019 05:58), Max: 98.3 (29 May 2019 09:24)  HR: 64 (30 May 2019 08:00) (58 - 105)  BP: 121/52 (30 May 2019 08:00) (105/60 - 143/62)  BP(mean): 76 (30 May 2019 08:00) (66 - 107)  ABP: --  ABP(mean): --  RR: 26 (30 May 2019 08:00) (12 - 27)  SpO2: 95% (30 May 2019 08:00) (92% - 98%)      05-29-19 @ 07:01  -  05-30-19 @ 07:00  --------------------------------------------------------  IN: 774 mL / OUT: 1700 mL / NET: -926 mL              < from: JAROD w/Doppler (05.29.19 @ 11:50) >  here is mild concentric left ventricular hypertrophy.There is mild global   hypokinesis of the left ventricle.The left atrium is dilated.No clot   seen in   the left atrium or in the left atrial appendage.Left atrial appendage   emptying   velocities are normal.Right atrial size is normal.The right ventricle is   normal in size and function.Structurally normal aortic valve.There is   mild   aortic regurgitation.Structurally normal mitral valve.There is mild to   moderate mitral regurgitation.Structurally normal tricuspid   valve.Structurally   normal pulmonic valve.No aortic root dilatation.Moderate atherosclerotic   plaque(s) in the aortic arch.Moderate atherosclerotic plaque(s) in the   descending aorta.There is no pericardial effusion.    < end of copied text > Hospital course:   Patient is a 79yo M with a PMHx of DM, HTN, OA BIBA to [facility] for generalized weakness, increased urinary frequency and intermittent dysuria. Patient found to have sepsis secondary to E. coli bacteremia 2/2 UTI on admission, now resolving. Hospital course c/b the development of new onset atrial flutter and globally depressed EF, likely due to the concomitant sepsis. Patient started on Ceftriaxone 2g Q24H with resolution of sepsis and clearance of bacteremia. Started on anticoagulation and beta blockade for atrial flutter. Cardiology consulted for management of atrial flutter given resolving sepsis and decision made to transfer patient to cardiology service for telemetry monitoring and further management of atrial flutter. Pt had JAROD and flutter of Aflutter on 5/29, c/b mucous plugging In ET tube resolved with replacement of ET tube, transferred to CCU for further management. Prior to transfer patient was extubated and received 1.3L NS Bolus. Upon arrival to CCU pt given 40lasix IVP. In the CCU, pt has been in sinus rhythm with frequent PAC, otherwise HD stable, and is ready for stepdown.     Overnight: IVORY. Was noted to be slightly disoriented, AAOx1 on arrival s/p anesthesia, resolved overnight to AAOx3.    Subjective: Pt seen and examined at bedside. Complains of mild sore throat relieved by drinking cold water. Also notes that he has persistent tingling of b/l LE without pain. Denies headache, dizziness, CP, SOB, AP, nausea, vomiting.       ICU Vital Signs Last 24 Hrs  T(C): 36.8 (30 May 2019 09:00), Max: 36.8 (30 May 2019 09:00)  T(F): 98.2 (30 May 2019 09:00), Max: 98.2 (30 May 2019 09:00)  HR: 60 (30 May 2019 10:00) (58 - 105)  BP: 131/56 (30 May 2019 10:00) (105/60 - 143/62)  BP(mean): 73 (30 May 2019 10:00) (66 - 107)  ABP: --  ABP(mean): --  RR: 20 (30 May 2019 10:00) (12 - 27)  SpO2: 95% (30 May 2019 10:00) (92% - 98%)      05-29-19 @ 07:01  -  05-30-19 @ 07:00  --------------------------------------------------------  IN: 774 mL / OUT: 1700 mL / NET: -926 mL    PHYSICAL EXAM:  Constitutional: WDWN resting comfortably in bed; NAD  Head: NC/AT  Eyes: clear conjunctiva  ENT: no nasal discharge; MMM  Respiratory: CTA B/L; no W/R/R, no retractions  Cardiac: +S1/S2; RRR; no M/R/G  Gastrointestinal: obese, soft, NT/ND; no rebound or guarding; normoactive BS  Back: spine midline  Extremities: WWP, no clubbing or cyanosis; no peripheral edema  Vascular: 2+  DP pulses B/L  Dermatologic: skin warm, dry and intact; no rashes, wounds, or scars  Neurologic: AAOx2  Psychiatric: affect and characteristics of appearance, verbalizations, behaviors are appropriate    MEDICATIONS  (STANDING):  apixaban 5 milliGRAM(s) Oral every 12 hours  atorvastatin 10 milliGRAM(s) Oral at bedtime  cefTRIAXone   IVPB 2 Gram(s) IV Intermittent every 24 hours  docusate sodium 100 milliGRAM(s) Oral two times a day  gabapentin 300 milliGRAM(s) Oral three times a day  insulin glargine Injectable (LANTUS) 10 Unit(s) SubCutaneous at bedtime  insulin lispro (HumaLOG) corrective regimen sliding scale   SubCutaneous Before meals and at bedtime  metoprolol tartrate 25 milliGRAM(s) Oral two times a day  polyethylene glycol 3350 17 Gram(s) Oral daily  senna 2 Tablet(s) Oral at bedtime  sertraline 100 milliGRAM(s) Oral daily    MEDICATIONS  (PRN):  acetaminophen   Tablet .. 650 milliGRAM(s) Oral every 6 hours PRN Temp greater or equal to 38C (100.4F), Moderate Pain (4 - 6)    .  LABS:                         12.1   11.44 )-----------( 383      ( 30 May 2019 05:04 )             39.7     05-30    139  |  100  |  86<H>  ----------------------------<  157<H>  4.7   |  22  |  3.14<H>    Ca    9.3      30 May 2019 05:04  Mg     2.6     05-30    TPro  7.5  /  Alb  2.8<L>  /  TBili  0.2  /  DBili  x   /  AST  22  /  ALT  27  /  AlkPhos  111  05-29    PT/INR - ( 29 May 2019 06:26 )   PT: 17.4 sec;   INR: 1.52          PTT - ( 29 May 2019 06:26 )  PTT:34.1 sec              RADIOLOGY, EKG & ADDITIONAL TESTS: Reviewed.

## 2019-05-30 NOTE — PROGRESS NOTE ADULT - PROBLEM SELECTOR PLAN 10
Transition of Care   1) PCP Contacted on Admission: (Y/N) --> Name & Phone #: Dr. Lundberg [phone];   Dr. Neil [phone], N/A  2) Date of Contact with PCP: TBD  3) PCP Contacted at Discharge: (Y/N): TBD  4) Summary of Handoff Given to PCP: TBD  5) Post-Discharge Appointment Date and Location: TBD Transition of Care   1) PCP Contacted on Admission: (Y/N) --> Name & Phone #: Dr. Lundberg [phone] [address]  Dr. Neil [phone]  2) Date of Contact with PCP: TBD  3) PCP Contacted at Discharge: (Y/N): TBD  4) Summary of Handoff Given to PCP: TBD  5) Post-Discharge Appointment Date and Location: TBD

## 2019-05-30 NOTE — PROVIDER CONTACT NOTE (OTHER) - ACTION/TREATMENT ORDERED:
tylenol Po given   500cc bolus NS given   ice packes placed under B/L arm,  continue to monitor   VSS Q 4hrs  urine cultures
Instructed to continue to monitor mental status, repeat Venous Blood Gas at 2300hrs. Bladder scan, informed MD of result.
Instructed to not give statin and gabapentin and senna and reschedule metoprolol for 0100hrs.

## 2019-05-30 NOTE — PROGRESS NOTE ADULT - ASSESSMENT
78M with h/o IDDM, HTN and OA who was BIBA to [facility] for generalized weakness, found to have sepsis 2/2 E. coli bacteremia 2/2 UTI, admission c/b new onset atrial flutter, s/p JAROD and ablation of A-flutter on 5/29, now in sinus rhythm with frequent PAC.

## 2019-05-30 NOTE — PROGRESS NOTE ADULT - SUBJECTIVE AND OBJECTIVE BOX
S/P  cardioversion to NSR   Improved  s/p Extubation     PAST MEDICAL & SURGICAL HISTORY:  OA (osteoarthritis)  HTN (hypertension)  IDDM (insulin dependent diabetes mellitus)  S/P cholecystectomy  S/P appendectomy  H/O knee surgery      MEDICATIONS  (STANDING):  apixaban 5 milliGRAM(s) Oral every 12 hours  atorvastatin 10 milliGRAM(s) Oral at bedtime  cefTRIAXone   IVPB 2 Gram(s) IV Intermittent every 24 hours  docusate sodium 100 milliGRAM(s) Oral two times a day  gabapentin 300 milliGRAM(s) Oral three times a day  insulin glargine Injectable (LANTUS) 10 Unit(s) SubCutaneous at bedtime  insulin lispro (HumaLOG) corrective regimen sliding scale   SubCutaneous Before meals and at bedtime  metoprolol tartrate 25 milliGRAM(s) Oral two times a day  polyethylene glycol 3350 17 Gram(s) Oral daily  senna 2 Tablet(s) Oral at bedtime  sertraline 100 milliGRAM(s) Oral daily    MEDICATIONS  (PRN):  acetaminophen   Tablet .. 650 milliGRAM(s) Oral every 6 hours PRN Temp greater or equal to 38C (100.4F), Moderate Pain (4 - 6)    ICU Vital Signs Last 24 Hrs  T(C): 36.8 (30 May 2019 09:00), Max: 36.8 (30 May 2019 09:00)  T(F): 98.2 (30 May 2019 09:00), Max: 98.2 (30 May 2019 09:00)  HR: 60 (30 May 2019 10:00) (58 - 105)  BP: 131/56 (30 May 2019 10:00) (105/60 - 143/62)  BP(mean): 73 (30 May 2019 10:00) (66 - 107)  ABP: --  ABP(mean): --  RR: 20 (30 May 2019 10:00) (12 - 27)  SpO2: 95% (30 May 2019 10:00) (92% - 98%)        Lungs clear  CV s1 s2  ab d  soft  ext stable    JAROD    LVH   global hypokinesis LVF reduced EF   no clot seen     last CXR clear                        12.1   11.44 )-----------( 383      ( 30 May 2019 05:04 )             39.7   05-30    139  |  100  |  86<H>  ----------------------------<  157<H>  4.7   |  22  |  3.14<H>    Ca    9.3      30 May 2019 05:04  Mg     2.6     05-30    TPro  7.5  /  Alb  2.8<L>  /  TBili  0.2  /  DBili  x   /  AST  22  /  ALT  27  /  AlkPhos  111  05-29      PT/INR - ( 29 May 2019 06:26 )   PT: 17.4 sec;   INR: 1.52          PTT - ( 29 May 2019 06:26 )  PTT:34.1 sec

## 2019-05-30 NOTE — PROGRESS NOTE ADULT - PROBLEM SELECTOR PLAN 10
Transition of Care   1) PCP Contacted on Admission: (Y/N) --> Name & Phone #: Dr. Lundberg [phone];   Dr. Neil [phone], N/A  2) Date of Contact with PCP: TBD  3) PCP Contacted at Discharge: (Y/N): TBD  4) Summary of Handoff Given to PCP: TBD  5) Post-Discharge Appointment Date and Location: TBD

## 2019-05-30 NOTE — PROGRESS NOTE ADULT - PROBLEM SELECTOR PLAN 4
Pt denies hx of HF but on Furosemide 80mg QD at home. On admission BNP 3296 (received fluid while @ [facility]).   - remains euvolemic on exam. denies SOB, no edema.   - CXR on admission: Mild pulmonary venous congestion  - Echo: moderate global hypokinesis, concentric LVH, EF 35%  -JAROD with mild LVH, global LV hypokinesis, dilated LA, mild-mod MR, moderate plaque in aorta  - Holding Lasix and lisinopril for now given MANAN. Pt denies hx of HF but on Furosemide 80mg QD at home. On admission BNP 3296 (received fluid while @ [facility]).   - remains euvolemic on exam. denies SOB, no edema.   - CXR on admission: Mild pulmonary venous congestion  - Echo: moderate global hypokinesis, concentric LVH, EF 35%  -JAROD with mild LVH, global LV hypokinesis, dilated LA, mild-mod MR, moderate plaque in aorta  - s/p 40 Lasix on 5/29 in setting of volume overload; currently euvolemic and CXR showing improvement of effusions  - Holding Lasix and lisinopril for now given MANAN.

## 2019-05-30 NOTE — PROGRESS NOTE ADULT - ASSESSMENT
s/p A fib now converted to NSR   Cardiomyopathy   Renal Insufficiency   cont med management     cont med management

## 2019-05-30 NOTE — PROGRESS NOTE ADULT - PROBLEM SELECTOR PLAN 1
New Aflutter during admission, 2 to 1. HR improves to 90s, on Tele can bump to 120s. s/p JAROD and ablation of A-flutter on 5/29.  -JAROD with mild LVH, global LV hypokinesis, dilated LA, mild-mod MR, moderate plaque in aorta  -c/w lopressor to 25mg BID   - c/w Eliquis 5mg Po BID.  -CTM      #Respiratory distress - Likely 2/2 to pulmonary vascular congestion in the setting of receiving 1.3L NS exacerbated by cardiac insufficiency.   -- F/U VBG   -- 40 IV lasix for now   -- C/w facemasrk   -- strict ins and outs  -- F/U CXR New Aflutter during admission, 2 to 1. HR improves to 90s, on Tele can bump to 120s. s/p JAROD and ablation of A-flutter on 5/29.  -RESOLVED pt now in sinus rhythm with frequent PAC.   -JAROD with mild LVH, global LV hypokinesis, dilated LA, mild-mod MR, moderate plaque in aorta  -c/w lopressor to 25mg BID   - c/w Eliquis 5mg Po BID.  -CTM daily EKG    #Respiratory distress - Pt with respiratory distress upon arrival to CCU s/p ablation. Likely 2/2 to pulmonary vascular congestion in the setting of receiving 1.3L NS during procedure, exacerbated by cardiac insufficiency. Given 40 lasix IVP in CCU  -RESOLVED  -now comfortable on O2 NC  - CXR with some pulmonary vascular congestion  -CTM respiratory status

## 2019-05-30 NOTE — PROGRESS NOTE ADULT - SUBJECTIVE AND OBJECTIVE BOX
Transfer of Care from CCU to [facility]   78M with a PMHx of DM, HTN, OA BIBA to [facility] for generalized weakness, increased urinary frequency and intermittent dysuria. Patient found to have sepsis secondary to E. coli bacteremia 2/2 UTI on admission, now resolving s/p 2g ceftriaxone daily with surveillance cx NGTD. Hospital course c/b the development of new onset A flutter and globally depressed EF, likely due to the concomitant sepsis and he was stepped up to CCU from [facility] for rate control and further management of the A flutter. Patient was intubated for the ablation last night and the intubation was c/b mucous plug that resolved after replacement of the tube. Patient extubated s/p procedure and patient is saturating well on 2LNC and breathing comfortably. He has since been started on AC with Eliquis and and Lopressor 25 BID for atrial flutter that is better rate controlled and he is currently NSR with some PACs.  He received 40mg IVP lasix this AM for volume overload but otherwise is hemodynamically stable for step down to [facility] for further management of A flutter.     SUBJECTIVE:     Vital Signs Last 12 Hrs  T(F): 98.2 (05-30-19 @ 09:00), Max: 98.2 (05-30-19 @ 09:00)  HR: 68 (05-30-19 @ 11:40) (58 - 68)  BP: 121/54 (05-30-19 @ 11:40) (111/49 - 143/62)  BP(mean): 70 (05-30-19 @ 11:40) (70 - 95)  RR: 23 (05-30-19 @ 11:40) (18 - 26)  SpO2: 96% (05-30-19 @ 11:00) (95% - 97%)  I&O's Summary    29 May 2019 07:01  -  30 May 2019 07:00  --------------------------------------------------------  IN: 774 mL / OUT: 1700 mL / NET: -926 mL        PHYSICAL EXAM:  Constitutional: NAD, comfortable in bed.  HEENT: NC/AT, PERRLA, EOMI, no conjunctival pallor or scleral icterus, MMM  Neck: Supple, no JVD  Respiratory: Normal rate, rhythm, depth, effort. CTAB. No w/r/r.   Cardiovascular: RRR, normal S1 and S2, no m/r/g.   Gastrointestinal: +BS, soft NTND, no guarding or rebound tenderness, no palpable masses   Extremities: wwp; no cyanosis, clubbing or edema.   Vascular: Pulses equal and strong throughout.   Neurological: AAOx3, no CN deficits, strength and sensation intact throughout.   Skin: No gross skin abnormalities or rashes        LABS:                        12.1   11.44 )-----------( 383      ( 30 May 2019 05:04 )             39.7     05-30    139  |  100  |  86<H>  ----------------------------<  157<H>  4.7   |  22  |  3.14<H>    Ca    9.3      30 May 2019 05:04  Mg     2.6     05-30    TPro  7.5  /  Alb  2.8<L>  /  TBili  0.2  /  DBili  x   /  AST  22  /  ALT  27  /  AlkPhos  111  05-29    PT/INR - ( 29 May 2019 06:26 )   PT: 17.4 sec;   INR: 1.52          PTT - ( 29 May 2019 06:26 )  PTT:34.1 sec      RADIOLOGY & ADDITIONAL TESTS:    MEDICATIONS  (STANDING):  apixaban 5 milliGRAM(s) Oral every 12 hours  atorvastatin 10 milliGRAM(s) Oral at bedtime  docusate sodium 100 milliGRAM(s) Oral two times a day  gabapentin 300 milliGRAM(s) Oral three times a day  insulin glargine Injectable (LANTUS) 10 Unit(s) SubCutaneous at bedtime  insulin lispro (HumaLOG) corrective regimen sliding scale   SubCutaneous Before meals and at bedtime  metoprolol tartrate 25 milliGRAM(s) Oral two times a day  polyethylene glycol 3350 17 Gram(s) Oral daily  senna 2 Tablet(s) Oral at bedtime  sertraline 100 milliGRAM(s) Oral daily    MEDICATIONS  (PRN):  acetaminophen   Tablet .. 650 milliGRAM(s) Oral every 6 hours PRN Temp greater or equal to 38C (100.4F), Moderate Pain (4 - 6) Transfer of Care from CCU to [facility]   78M with a PMHx of DM, HTN, OA BIBA to [facility] for generalized weakness, increased urinary frequency and intermittent dysuria. Patient found to have sepsis secondary to E. coli bacteremia 2/2 UTI on admission, now resolving s/p 2g ceftriaxone daily with surveillance cx NGTD. Hospital course c/b the development of new onset A flutter and globally depressed EF, likely due to the concomitant sepsis and he was stepped up to CCU from [facility] for rate control and further management of the A flutter. Patient was intubated for the ablation last night and the intubation was c/b mucous plug that resolved after replacement of the tube. Patient extubated s/p procedure and patient is saturating well on 2LNC and breathing comfortably. He has since been started on AC with Eliquis and and Lopressor 25 BID for atrial flutter that is better rate controlled and he is currently NSR with some PACs.  He received 40mg IVP lasix this AM for volume overload but otherwise is hemodynamically stable for step down to [facility] for further management of A flutter.     SUBJECTIVE: He reports feeling well.     Vital Signs Last 12 Hrs  T(F): 98.2 (05-30-19 @ 09:00), Max: 98.2 (05-30-19 @ 09:00)  HR: 68 (05-30-19 @ 11:40) (58 - 68)  BP: 121/54 (05-30-19 @ 11:40) (111/49 - 143/62)  BP(mean): 70 (05-30-19 @ 11:40) (70 - 95)  RR: 23 (05-30-19 @ 11:40) (18 - 26)  SpO2: 96% (05-30-19 @ 11:00) (95% - 97%)  I&O's Summary    29 May 2019 07:01  -  30 May 2019 07:00  --------------------------------------------------------  IN: 774 mL / OUT: 1700 mL / NET: -926 mL        PHYSICAL EXAM:  Constitutional: obese male NAD, comfortable in bed.  HEENT: NC/AT, PERRLA, EOMI, no conjunctival pallor or scleral icterus, MMM  Neck: Large neck, Supple, no JVD  Respiratory: Normal rate, rhythm, depth, effort. Lungs CTAB with some mild decreased breath sounds at the bases 2/2 body habitus   Cardiovascular: RRR, normal S1 and S2, no m/r/g.   Gastrointestinal: +BS, protuberant abdomen without any guarding or rebound tenderness, no palpable masses   : [place] cath in place draining clear yellow urine   Extremities: wwp; no cyanosis, clubbing or edema.   Vascular: Pulses equal and strong throughout.   Neurological: AAOx3(year name and hospital), no CN deficits, strength and sensation intact throughout.   Skin: No gross skin abnormalities or rashes        LABS:                        12.1   11.44 )-----------( 383      ( 30 May 2019 05:04 )             39.7     05-30    139  |  100  |  86<H>  ----------------------------<  157<H>  4.7   |  22  |  3.14<H>    Ca    9.3      30 May 2019 05:04  Mg     2.6     05-30    TPro  7.5  /  Alb  2.8<L>  /  TBili  0.2  /  DBili  x   /  AST  22  /  ALT  27  /  AlkPhos  111  05-29    PT/INR - ( 29 May 2019 06:26 )   PT: 17.4 sec;   INR: 1.52          PTT - ( 29 May 2019 06:26 )  PTT:34.1 sec      RADIOLOGY & ADDITIONAL TESTS:    MEDICATIONS  (STANDING):  apixaban 5 milliGRAM(s) Oral every 12 hours  atorvastatin 10 milliGRAM(s) Oral at bedtime  docusate sodium 100 milliGRAM(s) Oral two times a day  gabapentin 300 milliGRAM(s) Oral three times a day  insulin glargine Injectable (LANTUS) 10 Unit(s) SubCutaneous at bedtime  insulin lispro (HumaLOG) corrective regimen sliding scale   SubCutaneous Before meals and at bedtime  metoprolol tartrate 25 milliGRAM(s) Oral two times a day  polyethylene glycol 3350 17 Gram(s) Oral daily  senna 2 Tablet(s) Oral at bedtime  sertraline 100 milliGRAM(s) Oral daily    MEDICATIONS  (PRN):  acetaminophen   Tablet .. 650 milliGRAM(s) Oral every 6 hours PRN Temp greater or equal to 38C (100.4F), Moderate Pain (4 - 6) Transfer of Care from CCU to [facility]   78M with a PMHx of DM, HTN, OA BIBA to [facility] for generalized weakness, increased urinary frequency and intermittent dysuria. Patient found to have sepsis secondary to E. coli bacteremia 2/2 UTI on admission, now resolving s/p 2g ceftriaxone daily with surveillance cx NGTD. Hospital course c/b the development of new onset A flutter and globally depressed EF, likely due to the concomitant sepsis and he was stepped up to CCU from [facility] for rate control and further management of the A flutter. He was intubated and given 1.3L NS for the ablation last night and the intubation was c/b mucous plug and shortness of breath that resolved after replacement of the tube and 40mg IVP lasix. Patient extubated s/p procedure and patient is saturating well on 2LNC and breathing comfortably. He has since been started on AC with Eliquis and rate controlled with Lopressor 25 BID. He is currently NSR with some PACs. He is hemodynamically stable for step down to [facility] for further management of A flutter.     SUBJECTIVE: He reports feeling well. No chest pain, no shortness of breath. He reports no dysuria or abdominal pain. No n/v. Last bowel movement yesterday.    Vital Signs Last 12 Hrs  T(F): 98.2 (05-30-19 @ 09:00), Max: 98.2 (05-30-19 @ 09:00)  HR: 68 (05-30-19 @ 11:40) (58 - 68)  BP: 121/54 (05-30-19 @ 11:40) (111/49 - 143/62)  BP(mean): 70 (05-30-19 @ 11:40) (70 - 95)  RR: 23 (05-30-19 @ 11:40) (18 - 26)  SpO2: 96% (05-30-19 @ 11:00) (95% - 97%)  I&O's Summary    29 May 2019 07:01  -  30 May 2019 07:00  --------------------------------------------------------  IN: 774 mL / OUT: 1700 mL / NET: -926 mL        PHYSICAL EXAM:  Constitutional: obese male NAD, comfortable in bed.  HEENT: NC/AT, PERRLA, EOMI, no conjunctival pallor or scleral icterus, MMM  Neck: Large neck, Supple, no JVD  Respiratory: Normal rate, rhythm, depth, effort. Lungs CTAB with some mild decreased breath sounds at the bases 2/2 body habitus   Cardiovascular: RRR, normal S1 and S2, no m/r/g.   Gastrointestinal: +BS, protuberant abdomen without any guarding or rebound tenderness, no palpable masses   : [place] cath in place draining clear yellow urine   Extremities: wwp; no cyanosis, clubbing or edema.   Vascular: Pulses equal and strong throughout.   Neurological: AAOx3(year name and hospital), no CN deficits, strength and sensation intact throughout.   Skin: No gross skin abnormalities or rashes        LABS:                        12.1   11.44 )-----------( 383      ( 30 May 2019 05:04 )             39.7     05-30    139  |  100  |  86<H>  ----------------------------<  157<H>  4.7   |  22  |  3.14<H>    Ca    9.3      30 May 2019 05:04  Mg     2.6     05-30    TPro  7.5  /  Alb  2.8<L>  /  TBili  0.2  /  DBili  x   /  AST  22  /  ALT  27  /  AlkPhos  111  05-29    PT/INR - ( 29 May 2019 06:26 )   PT: 17.4 sec;   INR: 1.52          PTT - ( 29 May 2019 06:26 )  PTT:34.1 sec      RADIOLOGY & ADDITIONAL TESTS:    MEDICATIONS  (STANDING):  apixaban 5 milliGRAM(s) Oral every 12 hours  atorvastatin 10 milliGRAM(s) Oral at bedtime  docusate sodium 100 milliGRAM(s) Oral two times a day  gabapentin 300 milliGRAM(s) Oral three times a day  insulin glargine Injectable (LANTUS) 10 Unit(s) SubCutaneous at bedtime  insulin lispro (HumaLOG) corrective regimen sliding scale   SubCutaneous Before meals and at bedtime  metoprolol tartrate 25 milliGRAM(s) Oral two times a day  polyethylene glycol 3350 17 Gram(s) Oral daily  senna 2 Tablet(s) Oral at bedtime  sertraline 100 milliGRAM(s) Oral daily    MEDICATIONS  (PRN):  acetaminophen   Tablet .. 650 milliGRAM(s) Oral every 6 hours PRN Temp greater or equal to 38C (100.4F), Moderate Pain (4 - 6) Transfer of Care from CCU to [facility]   78M with a PMHx of DM, HTN, OA BIBA to [facility] for generalized weakness, increased urinary frequency and intermittent dysuria. Patient found to have sepsis secondary to E. coli bacteremia 2/2 UTI on admission, now resolving s/p 2g ceftriaxone daily with surveillance cx NGTD. Hospital course c/b the development of new onset A flutter and globally depressed EF, likely due to the concomitant sepsis and he was stepped up to CCU from [facility] for rate control and further management of the A flutter. He was intubated and given 1.3L NS for the ablation last night and the intubation was c/b mucous plug and shortness of breath that resolved after replacement of the tube and 40mg IVP lasix. Patient extubated s/p procedure and patient is saturating well on 2LNC and breathing comfortably. He has since been started on AC with Eliquis and rate controlled with Lopressor 25 BID. He is currently NSR with some PACs. He is hemodynamically stable for step down to [facility] for further management of A flutter.     SUBJECTIVE: He reports feeling well. No chest pain, no shortness of breath. He reports no dysuria or abdominal pain. No n/v. Last bowel movement yesterday.    Vital Signs Last 12 Hrs  T(F): 98.2 (05-30-19 @ 09:00), Max: 98.2 (05-30-19 @ 09:00)  HR: 68 (05-30-19 @ 11:40) (58 - 68)  BP: 121/54 (05-30-19 @ 11:40) (111/49 - 143/62)  BP(mean): 70 (05-30-19 @ 11:40) (70 - 95)  RR: 23 (05-30-19 @ 11:40) (18 - 26)  SpO2: 96% (05-30-19 @ 11:00) (95% - 97%)  I&O's Summary    29 May 2019 07:01  -  30 May 2019 07:00  --------------------------------------------------------  IN: 774 mL / OUT: 1700 mL / NET: -926 mL        PHYSICAL EXAM:  Constitutional: obese male NAD, comfortable in bed.  HEENT: NC/AT, PERRLA, EOMI, no conjunctival pallor or scleral icterus, MMM  Neck: Large neck, Supple, no JVD  Respiratory: Normal rate, rhythm, depth, effort. Lungs CTAB with some mild decreased breath sounds at the bases 2/2 body habitus   Cardiovascular: RRR, normal S1 and S2, no m/r/g.   Gastrointestinal: +BS, protuberant abdomen without any guarding or rebound tenderness, no palpable masses   : [place] cath in place draining clear yellow urine   Extremities: wwp; no cyanosis, clubbing or edema.   Vascular: Pulses equal and strong throughout.   Neurological: AAOx3(year name and hospital), no CN deficits, strength and sensation intact throughout.   Skin: No gross skin abnormalities or rashes        LABS:                        12.1   11.44 )-----------( 383      ( 30 May 2019 05:04 )             39.7     05-30    139  |  100  |  86<H>  ----------------------------<  157<H>  4.7   |  22  |  3.14<H>    Ca    9.3      30 May 2019 05:04  Mg     2.6     05-30    TPro  7.5  /  Alb  2.8<L>  /  TBili  0.2  /  DBili  x   /  AST  22  /  ALT  27  /  AlkPhos  111  05-29    PT/INR - ( 29 May 2019 06:26 )   PT: 17.4 sec;   INR: 1.52          PTT - ( 29 May 2019 06:26 )  PTT:34.1 sec    RADIOLOGY & ADDITIONAL TESTS:  < from: JARDO w/Doppler (05.29.19 @ 11:50) >  There is mild concentric left ventricular hypertrophy.There is mild global   hypokinesis of the left ventricle.The left atrium is dilated.No clot   seen in   the left atrium or in the left atrial appendage.Left atrial appendage   emptying   velocities are normal.Right atrial size is normal.The right ventricle is   normal in size and function.Structurally normal aortic valve.There is   mild   aortic regurgitation.Structurally normal mitral valve.There is mild to   moderate mitral regurgitation.Structurally normal tricuspid   valve.Structurally   normal pulmonic valve.No aortic root dilatation.Moderate atherosclerotic   plaque(s) in the aortic arch.Moderate atherosclerotic plaque(s) in the   descending aorta.There is no pericardial effusion.  < end of copied text >    < from: Echocardiogram (05.23.19 @ 09:59) >  There is mild concentric left ventricular hypertrophy.There is moderate   global hypokinesis of the left ventricle. The left ventricular ejection fraction   is 35%.The left atrial size is normal. Right atrial size is normal. The right   ventricle is normal in size and function. Structurally normal aortic   valve. No aortic regurgitation noted. Structurally normal mitral valve.No mitral   regurgitation noted. Structurally normal tricuspid valve.The pulmonic   valve is not well visualized.No aortic root dilatation. A trivial pericardial   effusion  < end of copied text >        MEDICATIONS  (STANDING):  apixaban 5 milliGRAM(s) Oral every 12 hours  atorvastatin 10 milliGRAM(s) Oral at bedtime  docusate sodium 100 milliGRAM(s) Oral two times a day  gabapentin 300 milliGRAM(s) Oral three times a day  insulin glargine Injectable (LANTUS) 10 Unit(s) SubCutaneous at bedtime  insulin lispro (HumaLOG) corrective regimen sliding scale   SubCutaneous Before meals and at bedtime  metoprolol tartrate 25 milliGRAM(s) Oral two times a day  polyethylene glycol 3350 17 Gram(s) Oral daily  senna 2 Tablet(s) Oral at bedtime  sertraline 100 milliGRAM(s) Oral daily    MEDICATIONS  (PRN):  acetaminophen   Tablet .. 650 milliGRAM(s) Oral every 6 hours PRN Temp greater or equal to 38C (100.4F), Moderate Pain (4 - 6)

## 2019-05-30 NOTE — PROGRESS NOTE ADULT - PROBLEM SELECTOR PLAN 9
VTE ppx: on Eliquis  GI: none  bowel: miralax, senna, colace    FULL CODE  Dispo: telemetry. PT Recs ALEYDA but patient refusing. f/u PT re-evaluation

## 2019-05-30 NOTE — PROGRESS NOTE ADULT - SUBJECTIVE AND OBJECTIVE BOX
Physical Medicine and Rehabilitation Progress Note:    Patient is a 78y old  Male who presents with a chief complaint of Generalized weakness (30 May 2019 12:29)      HPI:  78M with h/o IDDM, HTN, OA BIBA to [facility] for generalized weakness since waking up this morning. He noticed having increased urinary frequency for the last two days and intermittent dysuria today. Denies hx of BPH or discomfort with voiding. Denies nasal congestion, increased sputum production or cough. Patient lives alone since his wife passed away last year, has no children. He used to work at a ware-house, currently retired. He is a former smoker, quit smoking like 15 years ago. Of note, patient was at [facility] ED four days ago after sliding off his cough and falling on his knee. X-ray showed b/l OA of the knees. Pt uses cane at baseline for ambulation. He has taking Tylenol and Motrin daily for pain control.      At [facility], vitals Tm 100.7, P95, 165/78, R18 - R22, Saturation 93%RA -> 95% on 2L NC. Pt received ceftriaxone 1g, azithromycin 500mg, Lasix 20mg iv, 3L NS, Tylenol 650mg. Pt transferred to [facility] for further care.     ROS: Has increased urinary frequency and occasional dysuria. Reports chronic back pain and b/l knee pain. Denies fever, chills, SOB, CP, palpitations, abdominal pain, n/v or diarrhea. Last BM yesterday. (22 May 2019 21:46)                            12.1   11.44 )-----------( 383      ( 30 May 2019 05:04 )             39.7       05-30    139  |  100  |  86<H>  ----------------------------<  157<H>  4.7   |  22  |  3.14<H>    Ca    9.3      30 May 2019 05:04  Mg     2.6     05-30    TPro  7.5  /  Alb  2.8<L>  /  TBili  0.2  /  DBili  x   /  AST  22  /  ALT  27  /  AlkPhos  111  05-29    Vital Signs Last 24 Hrs  T(C): 36.8 (30 May 2019 14:06), Max: 36.8 (30 May 2019 09:00)  T(F): 98.3 (30 May 2019 14:06), Max: 98.3 (30 May 2019 14:06)  HR: 61 (30 May 2019 15:42) (58 - 72)  BP: 109/53 (30 May 2019 15:42) (105/60 - 143/62)  BP(mean): 70 (30 May 2019 11:40) (66 - 107)  RR: 22 (30 May 2019 15:42) (12 - 27)  SpO2: 90% (30 May 2019 15:42) (90% - 98%)    MEDICATIONS  (STANDING):  apixaban 5 milliGRAM(s) Oral every 12 hours  atorvastatin 10 milliGRAM(s) Oral at bedtime  docusate sodium 100 milliGRAM(s) Oral two times a day  gabapentin 300 milliGRAM(s) Oral three times a day  insulin glargine Injectable (LANTUS) 14 Unit(s) SubCutaneous at bedtime  insulin lispro (HumaLOG) corrective regimen sliding scale   SubCutaneous Before meals and at bedtime  insulin lispro Injectable (HumaLOG) 2 Unit(s) SubCutaneous three times a day with meals  metoprolol tartrate 25 milliGRAM(s) Oral two times a day  polyethylene glycol 3350 17 Gram(s) Oral daily  senna 2 Tablet(s) Oral at bedtime  sertraline 100 milliGRAM(s) Oral daily    MEDICATIONS  (PRN):  acetaminophen   Tablet .. 650 milliGRAM(s) Oral every 6 hours PRN Temp greater or equal to 38C (100.4F), Moderate Pain (4 - 6)    Currently Undergoing Physical Therapy at bedside.    Functional Status Assessment:    Therapeutic Interventions      Bed Mobility  Bed Mobility Training Rolling/Turning: moderate assist (50% patient effort);  2 person assist;  verbal cues;  bed rails  Bed Mobility Training Scooting: maximum assist (25% patient effort);  2 person assist;  verbal cues;  bed rails  Bed Mobility Training Supine-to-Sit: moderate assist (50% patient effort);  2 person assist;  verbal cues;  bed rails  Bed Mobility Training Limitations: decreased strength;  pain    Sit-Stand Transfer Training  Transfer Training Sit-to-Stand Transfer: Attempted to stand 2x with handheld assist. Patient unable 2/2 pain in (B) feet and (L) knee. Patient reports that this pain has been present with weight bearing since prior to hospitalization and resulted in a fall at home prior to admission 2/2 "I couldn't stand up because of the pain."    Therapeutic Exercise  Therapeutic Exercise Detail: Patient performed knee extension x 10 reps each LE prior to standing.           PM&R Impression: as above    Disposition Plan Recommendations: subacute rehab placement

## 2019-05-30 NOTE — PROGRESS NOTE ADULT - PROBLEM SELECTOR PLAN 6
Trop 0.07 --> 0.08, asymptomatic, EKG with no acute ST/T-wave changes. Likely secondary to demand ischemia in setting of sepsis/bacteremia  - hx of non obstructive, follows up with Dr. Neil  - continue metoprolol 25mg BID and Atorvastatin 10mg (pravastatin @ home)    #OA  Has chronic OA of bilateral knees, uses cane for ambulation. States his ambulation has been deteriorating with worsening pain. Takes tylenol/motrin daily for pain control. Xrays inpatient c/w OA  -tylenol PRN  -PT recommending ALEYDA but pt refusing Trop 0.07 --> 0.08, asymptomatic, EKG with no acute ST/T-wave changes. Likely secondary to demand ischemia in setting of sepsis/bacteremia  - hx of non obstructive, follows up with Dr. Neil  - continue metoprolol 25mg BID and Atorvastatin 10mg (pravastatin @ home)    #OA  Has chronic OA of bilateral knees, uses cane for ambulation. States his ambulation has been deteriorating with worsening pain. Takes tylenol/motrin daily for pain control. Xrays inpatient c/w OA  -tylenol PRN  -PT recommending ALEYDA but pt refusing. F/u PT re-evaluation

## 2019-05-31 PROBLEM — E11.9 TYPE 2 DIABETES MELLITUS WITHOUT COMPLICATIONS: Chronic | Status: ACTIVE | Noted: 2019-05-22

## 2019-05-31 PROBLEM — M19.90 UNSPECIFIED OSTEOARTHRITIS, UNSPECIFIED SITE: Chronic | Status: ACTIVE | Noted: 2019-05-22

## 2019-05-31 PROBLEM — I10 ESSENTIAL (PRIMARY) HYPERTENSION: Chronic | Status: ACTIVE | Noted: 2019-05-22

## 2019-05-31 LAB
ANION GAP SERPL CALC-SCNC: 16 MMOL/L — SIGNIFICANT CHANGE UP (ref 5–17)
BUN SERPL-MCNC: 80 MG/DL — HIGH (ref 7–23)
CALCIUM SERPL-MCNC: 8.7 MG/DL — SIGNIFICANT CHANGE UP (ref 8.4–10.5)
CHLORIDE SERPL-SCNC: 102 MMOL/L — SIGNIFICANT CHANGE UP (ref 96–108)
CO2 SERPL-SCNC: 19 MMOL/L — LOW (ref 22–31)
CREAT SERPL-MCNC: 3.08 MG/DL — HIGH (ref 0.5–1.3)
GLUCOSE BLDC GLUCOMTR-MCNC: 158 MG/DL — HIGH (ref 70–99)
GLUCOSE BLDC GLUCOMTR-MCNC: 160 MG/DL — HIGH (ref 70–99)
GLUCOSE BLDC GLUCOMTR-MCNC: 202 MG/DL — HIGH (ref 70–99)
GLUCOSE BLDC GLUCOMTR-MCNC: 284 MG/DL — HIGH (ref 70–99)
GLUCOSE SERPL-MCNC: 220 MG/DL — HIGH (ref 70–99)
HCT VFR BLD CALC: 36.4 % — LOW (ref 39–50)
HGB BLD-MCNC: 11.1 G/DL — LOW (ref 13–17)
MAGNESIUM SERPL-MCNC: 2.4 MG/DL — SIGNIFICANT CHANGE UP (ref 1.6–2.6)
MCHC RBC-ENTMCNC: 26 PG — LOW (ref 27–34)
MCHC RBC-ENTMCNC: 30.5 GM/DL — LOW (ref 32–36)
MCV RBC AUTO: 85.2 FL — SIGNIFICANT CHANGE UP (ref 80–100)
NRBC # BLD: 0 /100 WBCS — SIGNIFICANT CHANGE UP (ref 0–0)
PLATELET # BLD AUTO: 363 K/UL — SIGNIFICANT CHANGE UP (ref 150–400)
POTASSIUM SERPL-MCNC: 4.6 MMOL/L — SIGNIFICANT CHANGE UP (ref 3.5–5.3)
POTASSIUM SERPL-SCNC: 4.6 MMOL/L — SIGNIFICANT CHANGE UP (ref 3.5–5.3)
RBC # BLD: 4.27 M/UL — SIGNIFICANT CHANGE UP (ref 4.2–5.8)
RBC # FLD: 15 % — HIGH (ref 10.3–14.5)
SODIUM SERPL-SCNC: 137 MMOL/L — SIGNIFICANT CHANGE UP (ref 135–145)
WBC # BLD: 11.15 K/UL — HIGH (ref 3.8–10.5)
WBC # FLD AUTO: 11.15 K/UL — HIGH (ref 3.8–10.5)

## 2019-05-31 PROCEDURE — 71045 X-RAY EXAM CHEST 1 VIEW: CPT | Mod: 26

## 2019-05-31 PROCEDURE — 93010 ELECTROCARDIOGRAM REPORT: CPT

## 2019-05-31 PROCEDURE — 99233 SBSQ HOSP IP/OBS HIGH 50: CPT | Mod: GC

## 2019-05-31 RX ORDER — OXYCODONE AND ACETAMINOPHEN 5; 325 MG/1; MG/1
1 TABLET ORAL ONCE
Refills: 0 | Status: DISCONTINUED | OUTPATIENT
Start: 2019-05-31 | End: 2019-05-31

## 2019-05-31 RX ORDER — AMLODIPINE BESYLATE 2.5 MG/1
10 TABLET ORAL DAILY
Refills: 0 | Status: DISCONTINUED | OUTPATIENT
Start: 2019-05-31 | End: 2019-06-04

## 2019-05-31 RX ORDER — INSULIN LISPRO 100/ML
4 VIAL (ML) SUBCUTANEOUS
Refills: 0 | Status: DISCONTINUED | OUTPATIENT
Start: 2019-05-31 | End: 2019-06-01

## 2019-05-31 RX ORDER — INSULIN GLARGINE 100 [IU]/ML
20 INJECTION, SOLUTION SUBCUTANEOUS AT BEDTIME
Refills: 0 | Status: DISCONTINUED | OUTPATIENT
Start: 2019-05-31 | End: 2019-06-04

## 2019-05-31 RX ORDER — CIPROFLOXACIN LACTATE 400MG/40ML
500 VIAL (ML) INTRAVENOUS EVERY 24 HOURS
Refills: 0 | Status: DISCONTINUED | OUTPATIENT
Start: 2019-06-01 | End: 2019-06-04

## 2019-05-31 RX ORDER — ACETAMINOPHEN 500 MG
650 TABLET ORAL EVERY 6 HOURS
Refills: 0 | Status: DISCONTINUED | OUTPATIENT
Start: 2019-05-31 | End: 2019-06-04

## 2019-05-31 RX ADMIN — POLYETHYLENE GLYCOL 3350 17 GRAM(S): 17 POWDER, FOR SOLUTION ORAL at 11:49

## 2019-05-31 RX ADMIN — Medication 25 MILLIGRAM(S): at 21:12

## 2019-05-31 RX ADMIN — INSULIN GLARGINE 20 UNIT(S): 100 INJECTION, SOLUTION SUBCUTANEOUS at 21:44

## 2019-05-31 RX ADMIN — Medication 2: at 16:11

## 2019-05-31 RX ADMIN — Medication 650 MILLIGRAM(S): at 13:57

## 2019-05-31 RX ADMIN — Medication 25 MILLIGRAM(S): at 11:49

## 2019-05-31 RX ADMIN — CEFTRIAXONE 100 GRAM(S): 500 INJECTION, POWDER, FOR SOLUTION INTRAMUSCULAR; INTRAVENOUS at 05:36

## 2019-05-31 RX ADMIN — Medication 2 UNIT(S): at 07:27

## 2019-05-31 RX ADMIN — ATORVASTATIN CALCIUM 10 MILLIGRAM(S): 80 TABLET, FILM COATED ORAL at 21:13

## 2019-05-31 RX ADMIN — Medication 6: at 11:48

## 2019-05-31 RX ADMIN — GABAPENTIN 300 MILLIGRAM(S): 400 CAPSULE ORAL at 13:27

## 2019-05-31 RX ADMIN — APIXABAN 5 MILLIGRAM(S): 2.5 TABLET, FILM COATED ORAL at 11:49

## 2019-05-31 RX ADMIN — Medication 100 MILLIGRAM(S): at 19:25

## 2019-05-31 RX ADMIN — Medication 4: at 21:22

## 2019-05-31 RX ADMIN — OXYCODONE AND ACETAMINOPHEN 1 TABLET(S): 5; 325 TABLET ORAL at 23:19

## 2019-05-31 RX ADMIN — Medication 650 MILLIGRAM(S): at 13:27

## 2019-05-31 RX ADMIN — Medication 100 MILLIGRAM(S): at 05:36

## 2019-05-31 RX ADMIN — AMLODIPINE BESYLATE 10 MILLIGRAM(S): 2.5 TABLET ORAL at 19:25

## 2019-05-31 RX ADMIN — APIXABAN 5 MILLIGRAM(S): 2.5 TABLET, FILM COATED ORAL at 00:37

## 2019-05-31 RX ADMIN — GABAPENTIN 300 MILLIGRAM(S): 400 CAPSULE ORAL at 05:36

## 2019-05-31 RX ADMIN — Medication 4 UNIT(S): at 11:48

## 2019-05-31 RX ADMIN — Medication 4 UNIT(S): at 16:30

## 2019-05-31 RX ADMIN — SERTRALINE 100 MILLIGRAM(S): 25 TABLET, FILM COATED ORAL at 11:49

## 2019-05-31 RX ADMIN — OXYCODONE AND ACETAMINOPHEN 1 TABLET(S): 5; 325 TABLET ORAL at 21:22

## 2019-05-31 RX ADMIN — GABAPENTIN 300 MILLIGRAM(S): 400 CAPSULE ORAL at 21:12

## 2019-05-31 NOTE — PROGRESS NOTE ADULT - SUBJECTIVE AND OBJECTIVE BOX
Pt remains in NSR  post Cardiac Ablation     PAST MEDICAL & SURGICAL HISTORY:  OA (osteoarthritis)  HTN (hypertension)  IDDM (insulin dependent diabetes mellitus)  S/P cholecystectomy  S/P appendectomy  H/O knee surgery    MEDICATIONS  (STANDING):  apixaban 5 milliGRAM(s) Oral every 12 hours  atorvastatin 10 milliGRAM(s) Oral at bedtime  cefTRIAXone   IVPB 2 Gram(s) IV Intermittent every 24 hours  docusate sodium 100 milliGRAM(s) Oral two times a day  gabapentin 300 milliGRAM(s) Oral three times a day  insulin glargine Injectable (LANTUS) 14 Unit(s) SubCutaneous at bedtime  insulin lispro (HumaLOG) corrective regimen sliding scale   SubCutaneous Before meals and at bedtime  insulin lispro Injectable (HumaLOG) 2 Unit(s) SubCutaneous three times a day with meals  metoprolol tartrate 25 milliGRAM(s) Oral two times a day  polyethylene glycol 3350 17 Gram(s) Oral daily  senna 2 Tablet(s) Oral at bedtime  sertraline 100 milliGRAM(s) Oral daily    MEDICATIONS  (PRN):  acetaminophen   Tablet .. 650 milliGRAM(s) Oral every 6 hours PRN Temp greater or equal to 38C (100.4F), Moderate Pain (4 - 6)    ICU Vital Signs Last 24 Hrs  T(C): 36.7 (31 May 2019 05:00), Max: 37 (30 May 2019 20:52)  T(F): 98.1 (31 May 2019 05:00), Max: 98.6 (30 May 2019 20:52)  HR: 62 (31 May 2019 04:42) (60 - 70)  BP: 99/47 (31 May 2019 04:42) (99/47 - 131/56)  BP(mean): 66 (31 May 2019 04:42) (66 - 91)  ABP: --  ABP(mean): --  RR: 17 (31 May 2019 04:42) (17 - 25)  SpO2: 92% (31 May 2019 04:42) (90% - 96%)        Lungs  clear  CV  s1 s2  abd soft   ext stable                          11.1   11.15 )-----------( 363      ( 31 May 2019 08:29 )             36.4   05-30    139  |  100  |  86<H>  ----------------------------<  157<H>  4.7   |  22  |  3.14<H>    Ca    9.3      30 May 2019 05:04  Mg     2.6     05-30        Culture - Urine (05.23.19 @ 17:52)    Specimen Source: .Urine None    Culture Results:   No growth        Culture - Blood (05.24.19 @ 16:18)    Specimen Source: .Blood Blood    Culture Results:   No growth at 5 days.

## 2019-05-31 NOTE — PROGRESS NOTE ADULT - PROBLEM SELECTOR PLAN 7
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   -  Home Gabapentin increased to 300mg TID, however pt still with numbness, no pain  -  Continue to monitor and consider increasing the dose or adding second agent    #Depression  - continue Sertraline 100mg qd     #Overactive bladder   - holding home oxybutynin 5mg qd given UTI A1c 7.3  - Blood sugars continue to be elevated in the 200-220s  - increased to Lantus 20 units, Lispro 4units and insulin sliding scale     #peripheral neuropathy  2/2 DM2.   -  Home Gabapentin increased to 300mg TID, however pt still with numbness, no pain  -  Continue to monitor and consider increasing the dose or adding second agent    #Depression  - continue Sertraline 100mg qd     #Overactive bladder   - holding home oxybutynin 5mg qd given UTI

## 2019-05-31 NOTE — PROGRESS NOTE ADULT - PROBLEM SELECTOR PLAN 2
Sepsis (Tm 100.7, WBC 15 on admission) secondary to urinary tract infection. s/p 3L NS in ED, reperfusion assessment completed. c/b bacteremia with Ecoli, however urine cx negative (pt already received abx), surveillance blood cx 5/24 NGTD. CXR without focal consolidation.   -c/w CTX 2g QD (day 8/14 until 6/7)  - Pt afebrile, however w/ persistent leukocytosis (downtrending at 11.4 today 5/30)  - CT abd w/o contrast w/ minimal hydronephrosis w/ perinephric stranding, findings with possible pyelonephritis or recently passed kidney stone.  - US retroperitoneal negative for hydronephrosis  - per renal holding oxybutynin as can increase risk of UTI.    #Ecoli bacteremia 2/2 UTI  -plan as above

## 2019-05-31 NOTE — PROGRESS NOTE ADULT - ASSESSMENT
78M with h/o IDDM, HTN, OA BIBA to [facility] for generalized weakness, found to have sepsis 2/2 UTI, now with E.coli bacteremia, being treated with Ceftriaxone (started 5/22 ). Also with A flutter and new onset CHF. The patient s/p ablation done on 5/29, now making urine, renal function stable in the past 24 hours       Martin vs CKD  unknown baseline cr  - initially worsening of the renal function in the setting of urosepsis - UTI with E.coli - treated with Cefitriaxone, possible worsening of the renal function from cardiorenal syndrome - A flutter and new onset CHF   -no with stable renal function   - volume status acceptable   - renal u/s no hydronephrosis   - bladder scan on daily bases please   - please obtain quantification of the proteinuria   - electrolytes noted   - consider bicarb 650 mg three times a day   - in and outs   - daily weight   - consider reducing the dose of gabapentin to 100 mg three times a day     A flutter   - s/p ablation - 5/29   - on metorpolol   - on Apixaban     CHF   - treatement as per primary team   - EF 35%    Hypertension   - BP with fair control   - no need for BP mediations

## 2019-05-31 NOTE — PROGRESS NOTE ADULT - PROBLEM SELECTOR PLAN 1
New Aflutter during admission, 2 to 1. HR improves to 90s, on Tele can bump to 120s. s/p JAROD and ablation of A-flutter on 5/29.  -RESOLVED pt now in sinus rhythm with frequent PAC.   -JAROD with mild LVH, global LV hypokinesis, dilated LA, mild-mod MR, moderate plaque in aorta  -c/w lopressor to 25mg BID   - c/w Eliquis 5mg Po BID.  - daily EKGs    #Respiratory distress - Pt with respiratory distress upon arrival to CCU s/p ablation. Likely 2/2 to pulmonary vascular congestion in the setting of receiving 1.3L NS during procedure exacerbated by cardiac insufficiency. Given 40mg IV lasix  -RESOLVED  -now comfortable on 2L NC   - CXR with some pulmonary vascular congestion  - will continue to monitor respiratory status and assess if needs standing PO lasix; for now holding off on further diuresis as creatinine 3. New Aflutter during admission, 2 to 1. HR improves to 90s, on Tele can bump to 120s. s/p JAROD and ablation of A-flutter on 5/29.  -RESOLVED pt now in sinus rhythm with frequent PAC.   -JAROD with mild LVH, global LV hypokinesis, dilated LA, mild-mod MR, moderate plaque in aorta  -c/w lopressor to 25mg BID   - c/w Eliquis 5mg Po BID.  - daily EKGs    #Respiratory distress - Pt with respiratory distress upon arrival to CCU s/p ablation. Likely 2/2 to pulmonary vascular congestion in the setting of receiving 1.3L NS during procedure exacerbated by cardiac insufficiency. Given 40mg IV lasix  -RESOLVED  -now comfortable on 2L NC   - CXR with some pulmonary vascular congestion but much improved after lasix.   - will continue to monitor respiratory status and assess if needs standing PO lasix; for now holding off on further diuresis as creatinine 3.

## 2019-05-31 NOTE — PROGRESS NOTE ADULT - SUBJECTIVE AND OBJECTIVE BOX
Seen in the morning in his bed, on nasal cannula no shortness of breath, no chest pain, no fever, making urine - 1.9 liters     The renal follows the case for MANAN on CKD ?? - not well documented previous renal function   Now treated for UTI     Patient seen and examined at bedside.             Patient seen and examined at bedside.     acetaminophen   Tablet .. 650 milliGRAM(s) every 6 hours PRN  apixaban 5 milliGRAM(s) every 12 hours  atorvastatin 10 milliGRAM(s) at bedtime  cefTRIAXone   IVPB 2 Gram(s) every 24 hours  docusate sodium 100 milliGRAM(s) two times a day  gabapentin 300 milliGRAM(s) three times a day  insulin glargine Injectable (LANTUS) 14 Unit(s) at bedtime  insulin lispro (HumaLOG) corrective regimen sliding scale   Before meals and at bedtime  insulin lispro Injectable (HumaLOG) 2 Unit(s) three times a day with meals  metoprolol tartrate 25 milliGRAM(s) two times a day  polyethylene glycol 3350 17 Gram(s) daily  senna 2 Tablet(s) at bedtime  sertraline 100 milliGRAM(s) daily      Allergies    No Known Allergies    Intolerances        T(C): , Max: 37.2 (05-31-19 @ 09:28)  T(F): , Max: 98.9 (05-31-19 @ 09:28)  HR: 62 (05-31-19 @ 04:42)  BP: 99/47 (05-31-19 @ 04:42)  BP(mean): 66 (05-31-19 @ 04:42)  RR: 17 (05-31-19 @ 04:42)  SpO2: 92% (05-31-19 @ 04:42)  Wt(kg): --    05-30 @ 07:01  -  05-31 @ 07:00  --------------------------------------------------------  IN:    Oral Fluid: 657 mL  Total IN: 657 mL    OUT:    Incontinent per Condom Catheter: 1900 mL  Total OUT: 1900 mL    Total NET: -1243 mL      05-31 @ 07:01  -  05-31 @ 10:31  --------------------------------------------------------  IN:    Oral Fluid: 336 mL  Total IN: 336 mL    OUT:    Incontinent per Condom Catheter: 350 mL  Total OUT: 350 mL    Total NET: -14 mL    Physical exam:   Alert and oriented   No JVD   Normal air entry into the lungs, no wheezing, no crackles   RRR, normal s1/s2, no murmurs, rubs or gallops   Abdomen - soft, not tender, not distended   Extremities: no edema   Condom catheter           LABS:                        11.1   11.15 )-----------( 363      ( 31 May 2019 08:29 )             36.4     05-31    137  |  102  |  80<H>  ----------------------------<  220<H>  4.6   |  19<L>  |  3.08<H>    Ca    8.7      31 May 2019 08:29  Mg     2.4     05-31            Creatinine, Random Urine: 37 mg/dL (05-30 @ 03:32)        RADIOLOGY & ADDITIONAL STUDIES:

## 2019-05-31 NOTE — PROGRESS NOTE ADULT - PROBLEM SELECTOR PLAN 3
sCr 3.67 on admission (baseline Creat in February 2018 was 1.8 as per PCP records) FEUREA 49.6% (36.2% today) suggestive of intrarenal  - Crt downtrending, 3.14 today  - Hold home Enalapril and Lasix  - retroperitoneal US: B/L renal cysts measuring up to 5.5 cm on the right and 5.1cm on the left, negative for hydronephrosis  - 24 hour urine protein collection pending  - bladder scans routinely throughout the day   - appreciate renal recs

## 2019-05-31 NOTE — PROGRESS NOTE ADULT - SUBJECTIVE AND OBJECTIVE BOX
See nephrology fellow's note. I independently performed the key portions of the evaluation and management service provided. I agree with the above history, physical, and plan which I have reviewed and edited where appropriate. I find MANAN, HTN, DM. Follow SCr. Continue anti-hypertensives.  See full note.

## 2019-05-31 NOTE — PROGRESS NOTE ADULT - ASSESSMENT
s/p A Fib  now in RSR   s/p cardiac ablation     weakness      current c/s neg    for possible rehab evaluation

## 2019-05-31 NOTE — PROGRESS NOTE ADULT - ASSESSMENT
78M with h/o IDDM, HTN and OA who was BIBA to [facility] for generalized weakness, found to have sepsis 2/2 E. coli bacteremia 2/2 UTI, admission c/b new onset atrial flutter, s/p JAROD and ablation of A-flutter on 5/29, now in sinus rhythm with frequent PAC.

## 2019-05-31 NOTE — PROGRESS NOTE ADULT - SUBJECTIVE AND OBJECTIVE BOX
OVERNIGHT EVENTS: IVORY     SUBJECTIVE: He reports he is feeling well. No shortness of breath or chest pain.     Vital Signs Last 12 Hrs  T(F): 98.1 (05-31-19 @ 05:00), Max: 98.6 (05-30-19 @ 20:52)  HR: 62 (05-31-19 @ 04:42) (60 - 66)  BP: 99/47 (05-31-19 @ 04:42) (99/47 - 117/62)  BP(mean): 66 (05-31-19 @ 04:42) (66 - 78)  RR: 17 (05-31-19 @ 04:42) (17 - 19)  SpO2: 92% (05-31-19 @ 04:42) (91% - 94%)  I&O's Summary    30 May 2019 07:01  -  31 May 2019 07:00  --------------------------------------------------------  IN: 657 mL / OUT: 1900 mL / NET: -1243 mL        PHYSICAL EXAM:  Constitutional: NAD, comfortable in bed.  HEENT: NC/AT, PERRLA, EOMI, no conjunctival pallor or scleral icterus, MMM  Neck: Supple, no JVD  Respiratory: Normal rate, rhythm, depth, effort. CTAB. No w/r/r.   Cardiovascular: RRR, normal S1 and S2, no m/r/g.   Gastrointestinal: +BS, soft NTND, no guarding or rebound tenderness, no palpable masses   Extremities: wwp; no cyanosis, clubbing or edema.   Vascular: Pulses equal and strong throughout.   Neurological: AAOx3, no CN deficits, strength and sensation intact throughout.   Skin: No gross skin abnormalities or rashes        LABS:                        12.1   11.44 )-----------( 383      ( 30 May 2019 05:04 )             39.7     05-30    139  |  100  |  86<H>  ----------------------------<  157<H>  4.7   |  22  |  3.14<H>    Ca    9.3      30 May 2019 05:04  Mg     2.6     05-30      RADIOLOGY & ADDITIONAL TESTS:  < from: CT Abdomen and Pelvis No Cont (05.24.19 @ 12:06) >  INDICATION: Gram-negative bacteremia. Kidney stones.    TECHNIQUE: CT of the abdomen and pelvis was performed. Intravenous and   oral contrast material were not administered in accordance with the renal   stone protocol.  Axial and coronal images were produced and reviewed.    PRIOR STUDIES: No prior CT of abdomen is available for comparison.    FINDINGS: Images of the lower chest demonstrate pleural thickening in the   lung bases bilaterally associated with calcified pleural plaques.    Images of the upper abdomen demonstrate hepatic steatosis. There are a   few hypodense hepatic lesions measuring up to 2.1 cm, likely cysts. The   patient is status post cholecystectomy. Surgical clips are noted near the   tip of the right hepatic lobe. The pancreas is normal in appearance.   Splenic enlargement measuring 15 cm.    No adrenal abnormalities are seen.  There are 2 cysts in the left kidney   measuring up to 4.5 cm. There is no right hydronephrosis or   nephrolithiasis. The left kidney is larger than right. There are 2 cysts   in the left kidney measuring up to 5.6 cm. There is asymmetrical   stranding of the left perirenal fat. There is minimal hydronephrosis of   the collecting system of the left kidney. There is also stranding of the   fat surrounding the proximal and midportion of the left ureter. No   definite ureteral calculi are seen.    Calcific atherosclerosis disease of the abdominal aorta is noted. No   abdominal aortic aneurysm is seen. No retroperitoneal lymphadenopathy is   seen.    Evaluation of bowel is limited without oral contrast. Colonic   diverticulosis is noted. There is a moderate size fat-containing   periumbilical hernia. No ascites is evident.     Images of the pelvis demonstrate no bladder abnormality. Prostate is not   significantly enlarged.    Evaluation of the osseous structures demonstrates degenerative changes.      IMPRESSION:  Minimal left hydronephrosis with perinephric stranding. Left kidney is   larger than right. No definite ureteral calculi. Findings could be due to   polynephritis or a recently passed kidney stone. Consider CT urogram with   intravenous contrast.  < end of copied text >      MEDICATIONS  (STANDING):  apixaban 5 milliGRAM(s) Oral every 12 hours  atorvastatin 10 milliGRAM(s) Oral at bedtime  cefTRIAXone   IVPB 2 Gram(s) IV Intermittent every 24 hours  docusate sodium 100 milliGRAM(s) Oral two times a day  gabapentin 300 milliGRAM(s) Oral three times a day  insulin glargine Injectable (LANTUS) 14 Unit(s) SubCutaneous at bedtime  insulin lispro (HumaLOG) corrective regimen sliding scale   SubCutaneous Before meals and at bedtime  insulin lispro Injectable (HumaLOG) 2 Unit(s) SubCutaneous three times a day with meals  metoprolol tartrate 25 milliGRAM(s) Oral two times a day  polyethylene glycol 3350 17 Gram(s) Oral daily  senna 2 Tablet(s) Oral at bedtime  sertraline 100 milliGRAM(s) Oral daily    MEDICATIONS  (PRN):  acetaminophen   Tablet .. 650 milliGRAM(s) Oral every 6 hours PRN Temp greater or equal to 38C (100.4F), Moderate Pain (4 - 6) OVERNIGHT EVENTS: IVORY     SUBJECTIVE: He reports he is feeling well. No shortness of breath or chest pain. Encouraged patient to get up out of bed to walk but he reports that he cannot walk due to pain. PT saw patient yesterday and he was able to walk a little with them.     Vital Signs Last 12 Hrs  T(F): 98.1 (05-31-19 @ 05:00), Max: 98.6 (05-30-19 @ 20:52)  HR: 62 (05-31-19 @ 04:42) (60 - 66)  BP: 99/47 (05-31-19 @ 04:42) (99/47 - 117/62)  BP(mean): 66 (05-31-19 @ 04:42) (66 - 78)  RR: 17 (05-31-19 @ 04:42) (17 - 19)  SpO2: 92% (05-31-19 @ 04:42) (91% - 94%)  I&O's Summary    30 May 2019 07:01  -  31 May 2019 07:00  --------------------------------------------------------  IN: 657 mL / OUT: 1900 mL / NET: -1243 mL        PHYSICAL EXAM:  Constitutional: NAD, comfortable in bed.  HEENT: NC/AT, PERRLA, EOMI, no conjunctival pallor or scleral icterus, MMM  Neck: Supple, no JVD  Respiratory: Normal rate, rhythm, depth, effort. CTAB. No w/r/r.   Cardiovascular: RRR, normal S1 and S2, no m/r/g.   Gastrointestinal: +BS, protuberant abdomen that is soft NTND, no guarding or rebound tenderness, no palpable masses   : [place] cath in place draining yellow urine   Extremities: wwp; no cyanosis, clubbing or edema.   Vascular: Pulses equal and strong throughout.   Neurological: AAOx3, no CN deficits, strength and sensation intact throughout.   Skin: No gross skin abnormalities or rashes        LABS:                        12.1   11.44 )-----------( 383      ( 30 May 2019 05:04 )             39.7     05-30    139  |  100  |  86<H>  ----------------------------<  157<H>  4.7   |  22  |  3.14<H>    Ca    9.3      30 May 2019 05:04  Mg     2.6     05-30      RADIOLOGY & ADDITIONAL TESTS:  < from: CT Abdomen and Pelvis No Cont (05.24.19 @ 12:06) >  INDICATION: Gram-negative bacteremia. Kidney stones.    TECHNIQUE: CT of the abdomen and pelvis was performed. Intravenous and   oral contrast material were not administered in accordance with the renal   stone protocol.  Axial and coronal images were produced and reviewed.    PRIOR STUDIES: No prior CT of abdomen is available for comparison.    FINDINGS: Images of the lower chest demonstrate pleural thickening in the   lung bases bilaterally associated with calcified pleural plaques.    Images of the upper abdomen demonstrate hepatic steatosis. There are a   few hypodense hepatic lesions measuring up to 2.1 cm, likely cysts. The   patient is status post cholecystectomy. Surgical clips are noted near the   tip of the right hepatic lobe. The pancreas is normal in appearance.   Splenic enlargement measuring 15 cm.    No adrenal abnormalities are seen.  There are 2 cysts in the left kidney   measuring up to 4.5 cm. There is no right hydronephrosis or   nephrolithiasis. The left kidney is larger than right. There are 2 cysts   in the left kidney measuring up to 5.6 cm. There is asymmetrical   stranding of the left perirenal fat. There is minimal hydronephrosis of   the collecting system of the left kidney. There is also stranding of the   fat surrounding the proximal and midportion of the left ureter. No   definite ureteral calculi are seen.    Calcific atherosclerosis disease of the abdominal aorta is noted. No   abdominal aortic aneurysm is seen. No retroperitoneal lymphadenopathy is   seen.    Evaluation of bowel is limited without oral contrast. Colonic   diverticulosis is noted. There is a moderate size fat-containing   periumbilical hernia. No ascites is evident.     Images of the pelvis demonstrate no bladder abnormality. Prostate is not   significantly enlarged.    Evaluation of the osseous structures demonstrates degenerative changes.      IMPRESSION:  Minimal left hydronephrosis with perinephric stranding. Left kidney is   larger than right. No definite ureteral calculi. Findings could be due to   polynephritis or a recently passed kidney stone. Consider CT urogram with   intravenous contrast.  < end of copied text >      MEDICATIONS  (STANDING):  apixaban 5 milliGRAM(s) Oral every 12 hours  atorvastatin 10 milliGRAM(s) Oral at bedtime  cefTRIAXone   IVPB 2 Gram(s) IV Intermittent every 24 hours  docusate sodium 100 milliGRAM(s) Oral two times a day  gabapentin 300 milliGRAM(s) Oral three times a day  insulin glargine Injectable (LANTUS) 14 Unit(s) SubCutaneous at bedtime  insulin lispro (HumaLOG) corrective regimen sliding scale   SubCutaneous Before meals and at bedtime  insulin lispro Injectable (HumaLOG) 2 Unit(s) SubCutaneous three times a day with meals  metoprolol tartrate 25 milliGRAM(s) Oral two times a day  polyethylene glycol 3350 17 Gram(s) Oral daily  senna 2 Tablet(s) Oral at bedtime  sertraline 100 milliGRAM(s) Oral daily    MEDICATIONS  (PRN):  acetaminophen   Tablet .. 650 milliGRAM(s) Oral every 6 hours PRN Temp greater or equal to 38C (100.4F), Moderate Pain (4 - 6)

## 2019-05-31 NOTE — PROGRESS NOTE ADULT - PROBLEM SELECTOR PLAN 10
Transition of Care   1) PCP Contacted on Admission: (Y/N) --> Name & Phone #: Dr. Lundbreg [phone] [address]  Dr. Neil [phone]  2) Date of Contact with PCP: TBD  3) PCP Contacted at Discharge: (Y/N): TBD  4) Summary of Handoff Given to PCP: TBD  5) Post-Discharge Appointment Date and Location: TBD

## 2019-06-01 LAB
ANION GAP SERPL CALC-SCNC: 12 MMOL/L — SIGNIFICANT CHANGE UP (ref 5–17)
BUN SERPL-MCNC: 78 MG/DL — HIGH (ref 7–23)
CALCIUM SERPL-MCNC: 9.5 MG/DL — SIGNIFICANT CHANGE UP (ref 8.4–10.5)
CHLORIDE SERPL-SCNC: 108 MMOL/L — SIGNIFICANT CHANGE UP (ref 96–108)
CO2 SERPL-SCNC: 22 MMOL/L — SIGNIFICANT CHANGE UP (ref 22–31)
COLLECT DURATION TIME UR: 24 HR — SIGNIFICANT CHANGE UP
CREAT SERPL-MCNC: 2.94 MG/DL — HIGH (ref 0.5–1.3)
GLUCOSE BLDC GLUCOMTR-MCNC: 124 MG/DL — HIGH (ref 70–99)
GLUCOSE BLDC GLUCOMTR-MCNC: 132 MG/DL — HIGH (ref 70–99)
GLUCOSE BLDC GLUCOMTR-MCNC: 239 MG/DL — HIGH (ref 70–99)
GLUCOSE BLDC GLUCOMTR-MCNC: 294 MG/DL — HIGH (ref 70–99)
GLUCOSE SERPL-MCNC: 127 MG/DL — HIGH (ref 70–99)
HCT VFR BLD CALC: 39.1 % — SIGNIFICANT CHANGE UP (ref 39–50)
HGB BLD-MCNC: 11.9 G/DL — LOW (ref 13–17)
MAGNESIUM SERPL-MCNC: 2.4 MG/DL — SIGNIFICANT CHANGE UP (ref 1.6–2.6)
MCHC RBC-ENTMCNC: 25.9 PG — LOW (ref 27–34)
MCHC RBC-ENTMCNC: 30.4 GM/DL — LOW (ref 32–36)
MCV RBC AUTO: 85.2 FL — SIGNIFICANT CHANGE UP (ref 80–100)
NRBC # BLD: 0 /100 WBCS — SIGNIFICANT CHANGE UP (ref 0–0)
PLATELET # BLD AUTO: 415 K/UL — HIGH (ref 150–400)
POTASSIUM SERPL-MCNC: 5 MMOL/L — SIGNIFICANT CHANGE UP (ref 3.5–5.3)
POTASSIUM SERPL-SCNC: 5 MMOL/L — SIGNIFICANT CHANGE UP (ref 3.5–5.3)
PROT 24H UR-MRATE: 800 MG/24 H — HIGH (ref 50–100)
RBC # BLD: 4.59 M/UL — SIGNIFICANT CHANGE UP (ref 4.2–5.8)
RBC # FLD: 14.7 % — HIGH (ref 10.3–14.5)
SODIUM SERPL-SCNC: 142 MMOL/L — SIGNIFICANT CHANGE UP (ref 135–145)
TOTAL VOLUME - 24 HOUR: 1600 ML — SIGNIFICANT CHANGE UP
URINE CREATININE CALCULATION: 1 G/24 H — SIGNIFICANT CHANGE UP (ref 1–2)
WBC # BLD: 11.17 K/UL — HIGH (ref 3.8–10.5)
WBC # FLD AUTO: 11.17 K/UL — HIGH (ref 3.8–10.5)

## 2019-06-01 PROCEDURE — 93010 ELECTROCARDIOGRAM REPORT: CPT

## 2019-06-01 RX ORDER — INSULIN LISPRO 100/ML
6 VIAL (ML) SUBCUTANEOUS
Refills: 0 | Status: DISCONTINUED | OUTPATIENT
Start: 2019-06-01 | End: 2019-06-04

## 2019-06-01 RX ADMIN — SENNA PLUS 2 TABLET(S): 8.6 TABLET ORAL at 21:04

## 2019-06-01 RX ADMIN — Medication 25 MILLIGRAM(S): at 21:05

## 2019-06-01 RX ADMIN — APIXABAN 5 MILLIGRAM(S): 2.5 TABLET, FILM COATED ORAL at 11:23

## 2019-06-01 RX ADMIN — Medication 6: at 11:23

## 2019-06-01 RX ADMIN — POLYETHYLENE GLYCOL 3350 17 GRAM(S): 17 POWDER, FOR SOLUTION ORAL at 11:23

## 2019-06-01 RX ADMIN — Medication 4: at 22:07

## 2019-06-01 RX ADMIN — Medication 4 UNIT(S): at 11:24

## 2019-06-01 RX ADMIN — Medication 6 UNIT(S): at 18:23

## 2019-06-01 RX ADMIN — GABAPENTIN 300 MILLIGRAM(S): 400 CAPSULE ORAL at 21:04

## 2019-06-01 RX ADMIN — SERTRALINE 100 MILLIGRAM(S): 25 TABLET, FILM COATED ORAL at 11:23

## 2019-06-01 RX ADMIN — Medication 100 MILLIGRAM(S): at 06:47

## 2019-06-01 RX ADMIN — ATORVASTATIN CALCIUM 10 MILLIGRAM(S): 80 TABLET, FILM COATED ORAL at 21:04

## 2019-06-01 RX ADMIN — INSULIN GLARGINE 20 UNIT(S): 100 INJECTION, SOLUTION SUBCUTANEOUS at 22:06

## 2019-06-01 RX ADMIN — GABAPENTIN 300 MILLIGRAM(S): 400 CAPSULE ORAL at 06:47

## 2019-06-01 RX ADMIN — Medication 500 MILLIGRAM(S): at 06:47

## 2019-06-01 RX ADMIN — GABAPENTIN 300 MILLIGRAM(S): 400 CAPSULE ORAL at 14:22

## 2019-06-01 RX ADMIN — Medication 650 MILLIGRAM(S): at 22:29

## 2019-06-01 RX ADMIN — APIXABAN 5 MILLIGRAM(S): 2.5 TABLET, FILM COATED ORAL at 00:04

## 2019-06-01 RX ADMIN — Medication 650 MILLIGRAM(S): at 21:28

## 2019-06-01 RX ADMIN — Medication 25 MILLIGRAM(S): at 11:23

## 2019-06-01 RX ADMIN — Medication 4 UNIT(S): at 07:32

## 2019-06-01 RX ADMIN — Medication 100 MILLIGRAM(S): at 18:21

## 2019-06-01 RX ADMIN — AMLODIPINE BESYLATE 10 MILLIGRAM(S): 2.5 TABLET ORAL at 06:47

## 2019-06-01 NOTE — PROGRESS NOTE ADULT - PROBLEM SELECTOR PLAN 1
New Aflutter during admission, 2 to 1. HR improves to 90s, on Tele can bump to 120s. s/p JAROD and ablation of A-flutter on 5/29.  -RESOLVED pt now in sinus rhythm with frequent PAC.   -JAROD with mild LVH, global LV hypokinesis, dilated LA, mild-mod MR, moderate plaque in aorta  -c/w lopressor to 25mg BID   - c/w Eliquis 5mg Po BID.  - daily EKGs    #Respiratory distress - Pt with respiratory distress upon arrival to CCU s/p ablation. Likely 2/2 to pulmonary vascular congestion in the setting of receiving 1.3L NS during procedure exacerbated by cardiac insufficiency. Given 40mg IV lasix  -RESOLVED  -now comfortable on 2L NC   - CXR with some pulmonary vascular congestion but much improved after lasix.   - will continue to monitor respiratory status and assess if needs standing PO lasix; for now holding off on further diuresis as creatinine 3. New Aflutter during admission, 2 to 1. HR improves to 90s, on Tele can bump to 120s. s/p JAROD and ablation of A-flutter on 5/29.  -RESOLVED pt now in sinus rhythm with some PACs  -JAROD with mild LVH, global LV hypokinesis, dilated LA, mild-mod MR, moderate plaque in aorta  -c/w lopressor to 25mg BID   - c/w Eliquis 5mg Po BID.  - daily EKGs  - pending repeat echo   #Respiratory distress - Pt with respiratory distress upon arrival to CCU s/p ablation. Likely 2/2 to pulmonary vascular congestion in the setting of receiving 1.3L NS during procedure exacerbated by cardiac insufficiency. Given 40mg IV lasix  -RESOLVED  -now comfortable on 2L NC   - CXR with some pulmonary vascular congestion but much improved after lasix.   - will continue to monitor respiratory status and assess if needs standing PO lasix; for now holding off on further diuresis as creatinine 3 and patient breathing well on RA without desaturating

## 2019-06-01 NOTE — PROGRESS NOTE ADULT - PROBLEM SELECTOR PLAN 1
New Aflutter during admission, 2 to 1. HR improves to 90s, on Tele can bump to 120s. s/p JAROD and ablation of A-flutter on 5/29.  -RESOLVED pt now in sinus rhythm with some PACs  -JAROD with mild LVH, global LV hypokinesis, dilated LA, mild-mod MR, moderate plaque in aorta  -c/w lopressor to 25mg BID   - c/w Eliquis 5mg Po BID.  - daily EKGs  - pending repeat echo    #Respiratory distress - Pt with respiratory distress upon arrival to CCU s/p ablation. Likely 2/2 to pulmonary vascular congestion in the setting of receiving 1.3L NS during procedure exacerbated by cardiac insufficiency.   -RESOLVED - s/p 40mg IV lasix  -now comfortable on RA

## 2019-06-01 NOTE — PROGRESS NOTE ADULT - PROBLEM SELECTOR PLAN 10
Transition of Care   1) PCP Contacted on Admission: (Y/N) --> Name & Phone #: Dr. Lundberg [phone] [address]  Dr. Neil [phone]  2) Date of Contact with PCP: TBD  3) PCP Contacted at Discharge: (Y/N): TBD  4) Summary of Handoff Given to PCP: TBD  5) Post-Discharge Appointment Date and Location: TBD

## 2019-06-01 NOTE — PROGRESS NOTE ADULT - SUBJECTIVE AND OBJECTIVE BOX
CC: WEAKNESS      INTERVAL HISTORY:  complains of tingling in legs      ROS: No chest pain, no sob, no abd pain. No n/v/d    PAST MEDICAL & SURGICAL HISTORY:  OA (osteoarthritis)  HTN (hypertension)  IDDM (insulin dependent diabetes mellitus)  S/P cholecystectomy  S/P appendectomy  H/O knee surgery  No significant past surgical history      PHYSICAL EXAM:  T(C): 37.2 (06-01-19 @ 09:47), Max: 37.5 (05-31-19 @ 18:13)  HR: 72 (06-01-19 @ 08:20)  BP: 119/55 (06-01-19 @ 08:20) (111/67 - 151/70)  RR: 20 (06-01-19 @ 08:20)  SpO2: 91% (06-01-19 @ 08:20)  Wt(kg): --  I&O's Summary    31 May 2019 07:01  -  01 Jun 2019 07:00  --------------------------------------------------------  IN: 516 mL / OUT: 1400 mL / NET: -884 mL    01 Jun 2019 07:01  -  01 Jun 2019 12:45  --------------------------------------------------------  IN: 417 mL / OUT: 250 mL / NET: 167 mL      Weight   General: AAO x 3,  NAD.  HEENT: moist mucous membranes, no pallor/cyanosis.  Neck: no JVD visible.  Cardiac: S1, S2. RRR. No murmurs   Respratory: CTA b/l, no access muscle use.   Abdomen: soft. nontender. nondistended  Skin: no rashes.  Extremities: no LE edema b/l  Access:       DATA:                        11.9<L>  11.17<H> )-----------( 415<H>    ( 01 Jun 2019 06:27 )             39.1     Ferritin, Serum: 223 ng/mL (05-22 @ 22:25)      142    |  108    |  78<H>  ----------------------------<  127<H>  Ca:9.5   (01 Jun 2019 06:27)  5.0     |  22     |  2.94<H>      eGFR if Non : 20 <L>  eGFR if : 23 <L>                        MEDICATIONS  (STANDING):  amLODIPine   Tablet 10 milliGRAM(s) Oral daily  apixaban 5 milliGRAM(s) Oral every 12 hours  atorvastatin 10 milliGRAM(s) Oral at bedtime  ciprofloxacin     Tablet 500 milliGRAM(s) Oral every 24 hours  docusate sodium 100 milliGRAM(s) Oral two times a day  gabapentin 300 milliGRAM(s) Oral three times a day  insulin glargine Injectable (LANTUS) 20 Unit(s) SubCutaneous at bedtime  insulin lispro (HumaLOG) corrective regimen sliding scale   SubCutaneous Before meals and at bedtime  insulin lispro Injectable (HumaLOG) 4 Unit(s) SubCutaneous three times a day with meals  metoprolol tartrate 25 milliGRAM(s) Oral two times a day  polyethylene glycol 3350 17 Gram(s) Oral daily  senna 2 Tablet(s) Oral at bedtime  sertraline 100 milliGRAM(s) Oral daily    MEDICATIONS  (PRN):  acetaminophen   Tablet .. 650 milliGRAM(s) Oral every 6 hours PRN Moderate Pain (4 - 6)

## 2019-06-01 NOTE — PROGRESS NOTE ADULT - ASSESSMENT
78M PMH DM, HTN and OA who was BIBA to [facility] admitted to [facility] sepsis 2/2 E. coli bacteremia 2/2 UTI stepped up to CCU for new onset atrial flutter s/p JAROD and ablation of A-flutter on 5/29 now in sinus rhythm with frequent PAC w/o issues on 5LA now stepped down to F for further medical management

## 2019-06-01 NOTE — PROGRESS NOTE ADULT - PROBLEM SELECTOR PLAN 2
Sepsis (Tm 100.7, WBC 15 on admission) secondary to urinary tract infection. s/p 3L NS in ED, reperfusion assessment completed. c/b bacteremia with Ecoli, however urine cx negative (pt already received abx), surveillance blood cx 5/24 NGTD. CXR without focal consolidation.   -transitioned to oral medications and will c/w ciprofloxacin until 6/7. (was on ceftriaxone until yesterday 5/31)  - Pt afebrile, however w/ persistent leukocytosis (downtrending at 11.4 today 5/30)  - CT abd w/o contrast w/ minimal hydronephrosis w/ perinephric stranding, findings with possible pyelonephritis or recently passed kidney stone.  - US retroperitoneal negative for hydronephrosis  - per renal holding oxybutynin as can increase risk of UTI.    #Ecoli bacteremia 2/2 UTI  -plan as above Sepsis (Tm 100.7, WBC 15 on admission) secondary to urinary tract infection. s/p 3L NS in ED, reperfusion assessment completed. c/b bacteremia with Ecoli, however urine cx negative (pt already received abx), surveillance blood cx 5/24 NGTD. CXR without focal consolidation.   -transitioned to oral medications and will c/w ciprofloxacin until 6/7. (was on ceftriaxone until yesterday 5/31)  - Pt afebrile, however w/ persistent leukocytosis (downtrending at 11)  - CT abd w/o contrast w/ minimal hydronephrosis w/ perinephric stranding, findings with possible pyelonephritis or recently passed kidney stone.  - US retroperitoneal negative for hydronephrosis  - per renal holding oxybutynin as can increase risk of UTI.    #Ecoli bacteremia 2/2 UTI  -plan as above

## 2019-06-01 NOTE — PROGRESS NOTE ADULT - PROBLEM SELECTOR PLAN 3
sCr 3.67 on admission (baseline Creat in February 2018 was 1.8 as per PCP records) FEUREA 49.6% (36.2% today) suggestive of intrarenal  - Crt downtrending, to 2.94 today  - Hold home Enalapril and Lasix  - retroperitoneal US: B/L renal cysts measuring up to 5.5 cm on the right and 5.1cm on the left, negative for hydronephrosis  - 24 hour urine protein collection pending  - bladder scans routinely throughout the day   - appreciate renal recs

## 2019-06-01 NOTE — PROGRESS NOTE ADULT - PROBLEM SELECTOR PLAN 4
Pt denies hx of HF but on Furosemide 80mg QD at home. On admission BNP 3296 (received fluid while @ [facility]).   - remains euvolemic on exam. denies SOB, no edema.   - CXR on admission: Mild pulmonary venous congestion  - Echo: moderate global hypokinesis, concentric LVH, EF 35%  -JAROD with mild LVH, global LV hypokinesis, dilated LA, mild-mod MR, moderate plaque in aorta  - s/p 40 Lasix on 5/29 in setting of volume overload; currently euvolemic and CXR showing improvement of effusions  - Holding Lasix and lisinopril for now given MANAN.

## 2019-06-01 NOTE — PROGRESS NOTE ADULT - SUBJECTIVE AND OBJECTIVE BOX
OVERNIGHT EVENTS: IVORY     SUBJECTIVE: He reports feeling some chest discomfort after eating today. Ordered EKG and will follow up. No shortness of breath.     Vital Signs Last 12 Hrs  T(F): 97.6 (06-01-19 @ 05:00), Max: 98.7 (05-31-19 @ 21:49)  HR: 62 (06-01-19 @ 04:40) (62 - 62)  BP: 145/60 (06-01-19 @ 04:40) (124/59 - 145/60)  BP(mean): 93 (06-01-19 @ 04:40) (90 - 93)  RR: 19 (06-01-19 @ 04:40) (18 - 19)  SpO2: 92% (06-01-19 @ 04:40) (92% - 92%)  I&O's Summary    31 May 2019 07:01  -  01 Jun 2019 07:00  --------------------------------------------------------  IN: 516 mL / OUT: 1400 mL / NET: -884 mL        PHYSICAL EXAM:  Constitutional: NAD, comfortable in bed.  HEENT: NC/AT, PERRLA, EOMI, no conjunctival pallor or scleral icterus, MMM  Neck: Supple, no JVD  Respiratory: Normal rate, rhythm, depth, effort. CTAB. No w/r/r.   Cardiovascular: RRR, normal S1 and S2, no m/r/g.   Gastrointestinal: +BS, soft NTND, no guarding or rebound tenderness, no palpable masses   Extremities: wwp; no cyanosis, clubbing or edema.   Vascular: Pulses equal and strong throughout.   Neurological: AAOx3, no CN deficits, strength and sensation intact throughout.   Skin: No gross skin abnormalities or rashes        LABS:                        11.9   11.17 )-----------( 415      ( 01 Jun 2019 06:27 )             39.1     06-01    142  |  108  |  78<H>  ----------------------------<  127<H>  5.0   |  22  |  2.94<H>    Ca    9.5      01 Jun 2019 06:27  Mg     2.4     06-01    RADIOLOGY & ADDITIONAL TESTS: Reviewed     MEDICATIONS  (STANDING):  amLODIPine   Tablet 10 milliGRAM(s) Oral daily  apixaban 5 milliGRAM(s) Oral every 12 hours  atorvastatin 10 milliGRAM(s) Oral at bedtime  ciprofloxacin     Tablet 500 milliGRAM(s) Oral every 24 hours  docusate sodium 100 milliGRAM(s) Oral two times a day  gabapentin 300 milliGRAM(s) Oral three times a day  insulin glargine Injectable (LANTUS) 20 Unit(s) SubCutaneous at bedtime  insulin lispro (HumaLOG) corrective regimen sliding scale   SubCutaneous Before meals and at bedtime  insulin lispro Injectable (HumaLOG) 4 Unit(s) SubCutaneous three times a day with meals  metoprolol tartrate 25 milliGRAM(s) Oral two times a day  polyethylene glycol 3350 17 Gram(s) Oral daily  senna 2 Tablet(s) Oral at bedtime  sertraline 100 milliGRAM(s) Oral daily    MEDICATIONS  (PRN):  acetaminophen   Tablet .. 650 milliGRAM(s) Oral every 6 hours PRN Moderate Pain (4 - 6) [facility]   79yo M with a PMHx of DM, HTN, OA BIBA to [facility] for generalized weakness, increased urinary frequency and intermittent dysuria. Patient found to have sepsis secondary to E. coli bacteremia 2/2 UTI on admission, now resolving. Hospital course c/b the development of new onset atrial flutter and globally depressed EF, likely due to the concomitant sepsis. Patient started on Ceftriaxone 2g Q24H with resolution of sepsis and clearance of bacteremia. Started on anticoagulation and beta blockade for atrial flutter. Cardiology consulted for management of atrial flutter given resolving sepsis and decision made to transfer patient to cardiology service for telemetry monitoring and further management of atrial flutter. Pt had JAORD and flutter of Aflutter on 5/29, c/b mucous plugging In ET tube resolved with replacement of ET tube, transferred to CCU for further management. Prior to transfer patient was extubated and received 1.3L NS Bolus. Upon arrival to CCU pt given 40lasix IVP. Hospital course c/b new onset acute renal failure with creatinine of 3 (baseline in Feb 2018 was 1.8). Patient pending 24 hour urine collection from renal standpoint. Patient to be continued on cipro for E.coli bacteremia until 6/7. In [facility], pt has been in sinus rhythm with some PAC, otherwise HD stable w/o shortness of breath or chest pain and is ready for stepdown to [facility].     OVERNIGHT EVENTS: IVORY     SUBJECTIVE: He reports feeling some chest discomfort after eating today. Ordered EKG and no changes from prior. No shortness of breath.     Vital Signs Last 12 Hrs  T(F): 97.6 (06-01-19 @ 05:00), Max: 98.7 (05-31-19 @ 21:49)  HR: 62 (06-01-19 @ 04:40) (62 - 62)  BP: 145/60 (06-01-19 @ 04:40) (124/59 - 145/60)  BP(mean): 93 (06-01-19 @ 04:40) (90 - 93)  RR: 19 (06-01-19 @ 04:40) (18 - 19)  SpO2: 92% (06-01-19 @ 04:40) (92% - 92%)  I&O's Summary    31 May 2019 07:01  -  01 Jun 2019 07:00  --------------------------------------------------------  IN: 516 mL / OUT: 1400 mL / NET: -884 mL        PHYSICAL EXAM:  Constitutional: NAD, comfortable in bed.  HEENT: NC/AT, PERRLA, EOMI, no conjunctival pallor or scleral icterus, MMM  Neck: Supple, no JVD  Respiratory: Normal rate, rhythm, depth, effort. CTAB. No w/r/r.   Cardiovascular: RRR, normal S1 and S2, no m/r/g.   Gastrointestinal: +BS, soft NTND, no guarding or rebound tenderness, no palpable masses   Extremities: wwp; no cyanosis, clubbing or edema.   Vascular: Pulses equal and strong throughout.   Neurological: AAOx3, no CN deficits, strength and sensation intact throughout.   Skin: No gross skin abnormalities or rashes        LABS:                        11.9   11.17 )-----------( 415      ( 01 Jun 2019 06:27 )             39.1     06-01    142  |  108  |  78<H>  ----------------------------<  127<H>  5.0   |  22  |  2.94<H>    Ca    9.5      01 Jun 2019 06:27  Mg     2.4     06-01    RADIOLOGY & ADDITIONAL TESTS: Reviewed     MEDICATIONS  (STANDING):  amLODIPine   Tablet 10 milliGRAM(s) Oral daily  apixaban 5 milliGRAM(s) Oral every 12 hours  atorvastatin 10 milliGRAM(s) Oral at bedtime  ciprofloxacin     Tablet 500 milliGRAM(s) Oral every 24 hours  docusate sodium 100 milliGRAM(s) Oral two times a day  gabapentin 300 milliGRAM(s) Oral three times a day  insulin glargine Injectable (LANTUS) 20 Unit(s) SubCutaneous at bedtime  insulin lispro (HumaLOG) corrective regimen sliding scale   SubCutaneous Before meals and at bedtime  insulin lispro Injectable (HumaLOG) 4 Unit(s) SubCutaneous three times a day with meals  metoprolol tartrate 25 milliGRAM(s) Oral two times a day  polyethylene glycol 3350 17 Gram(s) Oral daily  senna 2 Tablet(s) Oral at bedtime  sertraline 100 milliGRAM(s) Oral daily    MEDICATIONS  (PRN):  acetaminophen   Tablet .. 650 milliGRAM(s) Oral every 6 hours PRN Moderate Pain (4 - 6) Transfer of Care from [facility] to [facility]   78M with a PMHx of DM, HTN, OA BIBA to [facility] for generalized weakness, increased urinary frequency and intermittent dysuria. Patient found to have sepsis secondary to E. coli bacteremia 2/2 UTI on admission, now resolving s/p 2g ceftriaxone daily with surveillance cx NGTD. Hospital course c/b the development of new onset A flutter and globally depressed EF, likely due to the concomitant sepsis and he was stepped up to CCU from [facility] for rate control and further management of the A flutter. He was intubated and given 1.3L NS for the ablation and the intubation was c/b mucous plug and shortness of breath that resolved after replacement of the tube and 40mg IVP lasix. Patient extubated s/p procedure and patient is saturating well on RA and breathing comfortably. He has since been started on AC with Eliquis and rate controlled with Lopressor 25 BID. He is currently in NSR with some PACs. Hospital course c/b new onset acute renal failure with creatinine of 3 (baseline in Feb 2018 was 1.8). Patient pending 24 hour urine collection from renal standpoint. Patient to be continued on cipro for E.coli bacteremia until 6/7. In [facility], pt has been in sinus rhythm with some PAC, otherwise HD stable w/o shortness of breath or chest pain and is ready for stepdown to [facility].     OVERNIGHT EVENTS: IVORY     SUBJECTIVE: He reports feeling some chest discomfort after eating today. Ordered EKG and no changes from prior. No shortness of breath.     Vital Signs Last 12 Hrs  T(F): 97.6 (06-01-19 @ 05:00), Max: 98.7 (05-31-19 @ 21:49)  HR: 62 (06-01-19 @ 04:40) (62 - 62)  BP: 145/60 (06-01-19 @ 04:40) (124/59 - 145/60)  BP(mean): 93 (06-01-19 @ 04:40) (90 - 93)  RR: 19 (06-01-19 @ 04:40) (18 - 19)  SpO2: 92% (06-01-19 @ 04:40) (92% - 92%)  I&O's Summary    31 May 2019 07:01  -  01 Jun 2019 07:00  --------------------------------------------------------  IN: 516 mL / OUT: 1400 mL / NET: -884 mL        PHYSICAL EXAM:  Constitutional: NAD, comfortable in bed.  HEENT: NC/AT, PERRLA, EOMI, no conjunctival pallor or scleral icterus, MMM  Neck: Supple, no JVD  Respiratory: Normal rate, rhythm, depth, effort. CTAB. No w/r/r.   Cardiovascular: RRR, normal S1 and S2, no m/r/g.   Gastrointestinal: +BS, soft NTND, no guarding or rebound tenderness, no palpable masses   Extremities: wwp; no cyanosis, clubbing or edema.   Vascular: Pulses equal and strong throughout.   Neurological: AAOx3, no CN deficits, strength and sensation intact throughout.   Skin: No gross skin abnormalities or rashes        LABS:                        11.9   11.17 )-----------( 415      ( 01 Jun 2019 06:27 )             39.1     06-01    142  |  108  |  78<H>  ----------------------------<  127<H>  5.0   |  22  |  2.94<H>    Ca    9.5      01 Jun 2019 06:27  Mg     2.4     06-01    RADIOLOGY & ADDITIONAL TESTS: Reviewed     MEDICATIONS  (STANDING):  amLODIPine   Tablet 10 milliGRAM(s) Oral daily  apixaban 5 milliGRAM(s) Oral every 12 hours  atorvastatin 10 milliGRAM(s) Oral at bedtime  ciprofloxacin     Tablet 500 milliGRAM(s) Oral every 24 hours  docusate sodium 100 milliGRAM(s) Oral two times a day  gabapentin 300 milliGRAM(s) Oral three times a day  insulin glargine Injectable (LANTUS) 20 Unit(s) SubCutaneous at bedtime  insulin lispro (HumaLOG) corrective regimen sliding scale   SubCutaneous Before meals and at bedtime  insulin lispro Injectable (HumaLOG) 4 Unit(s) SubCutaneous three times a day with meals  metoprolol tartrate 25 milliGRAM(s) Oral two times a day  polyethylene glycol 3350 17 Gram(s) Oral daily  senna 2 Tablet(s) Oral at bedtime  sertraline 100 milliGRAM(s) Oral daily    MEDICATIONS  (PRN):  acetaminophen   Tablet .. 650 milliGRAM(s) Oral every 6 hours PRN Moderate Pain (4 - 6) Transfer of Care from [facility] to [facility]   78M with a PMHx of DM, HTN, OA BIBA to [facility] for generalized weakness, increased urinary frequency and intermittent dysuria. Patient found to have sepsis secondary to E. coli bacteremia 2/2 UTI on admission, now resolving s/p 2g ceftriaxone daily with surveillance cx NGTD. Hospital course c/b the development of new onset A flutter and globally depressed EF, likely due to the concomitant sepsis and he was stepped up to CCU from [facility] for rate control and further management of the A flutter. He was intubated and given 1.3L NS for the ablation and the intubation was c/b mucous plug and shortness of breath that resolved after replacement of the tube and 40mg IVP lasix. Patient extubated s/p procedure and patient is saturating well on RA and breathing comfortably. He has since been started on AC with Eliquis and rate controlled with Lopressor 25 BID. Hospital course also c/b new onset acute renal failure with creatinine of 3 (baseline in Feb 2018 was 1.8). Patient pending 24 hour urine collection from renal standpoint. Patient to be continued on cipro for E.coli bacteremia until 6/7. In [facility], pt has been in sinus rhythm with some PAC, otherwise HD stable w/o shortness of breath or chest pain and is ready for stepdown to [facility].     OVERNIGHT EVENTS: IVORY     SUBJECTIVE: He reports feeling some chest discomfort after eating today. Ordered EKG and no changes from prior. No shortness of breath.     Vital Signs Last 12 Hrs  T(F): 97.6 (06-01-19 @ 05:00), Max: 98.7 (05-31-19 @ 21:49)  HR: 62 (06-01-19 @ 04:40) (62 - 62)  BP: 145/60 (06-01-19 @ 04:40) (124/59 - 145/60)  BP(mean): 93 (06-01-19 @ 04:40) (90 - 93)  RR: 19 (06-01-19 @ 04:40) (18 - 19)  SpO2: 92% (06-01-19 @ 04:40) (92% - 92%)  I&O's Summary    31 May 2019 07:01  -  01 Jun 2019 07:00  --------------------------------------------------------  IN: 516 mL / OUT: 1400 mL / NET: -884 mL        PHYSICAL EXAM:  Constitutional: obese male in NAD, comfortable in bed.  HEENT: NC/AT, PERRLA, EOMI, no conjunctival pallor or scleral icterus, MMM  Neck: Supple, no JVD  Respiratory: Normal rate, rhythm, depth, effort. CTAB. No w/r/r.   Cardiovascular: RRR, normal S1 and S2, no m/r/g.   Gastrointestinal: +BS, soft NTND, no guarding or rebound tenderness, no palpable masses   Extremities: wwp; no cyanosis, clubbing or edema.   Vascular: Pulses equal and strong throughout.   Neurological: AAOx2 (self and location), no CN deficits, strength and sensation intact throughout.   Skin: No gross skin abnormalities or rashes  Psych: flat affect         LABS:                        11.9   11.17 )-----------( 415      ( 01 Jun 2019 06:27 )             39.1     06-01    142  |  108  |  78<H>  ----------------------------<  127<H>  5.0   |  22  |  2.94<H>    Ca    9.5      01 Jun 2019 06:27  Mg     2.4     06-01    RADIOLOGY & ADDITIONAL TESTS: Reviewed     MEDICATIONS  (STANDING):  amLODIPine   Tablet 10 milliGRAM(s) Oral daily  apixaban 5 milliGRAM(s) Oral every 12 hours  atorvastatin 10 milliGRAM(s) Oral at bedtime  ciprofloxacin     Tablet 500 milliGRAM(s) Oral every 24 hours  docusate sodium 100 milliGRAM(s) Oral two times a day  gabapentin 300 milliGRAM(s) Oral three times a day  insulin glargine Injectable (LANTUS) 20 Unit(s) SubCutaneous at bedtime  insulin lispro (HumaLOG) corrective regimen sliding scale   SubCutaneous Before meals and at bedtime  insulin lispro Injectable (HumaLOG) 4 Unit(s) SubCutaneous three times a day with meals  metoprolol tartrate 25 milliGRAM(s) Oral two times a day  polyethylene glycol 3350 17 Gram(s) Oral daily  senna 2 Tablet(s) Oral at bedtime  sertraline 100 milliGRAM(s) Oral daily    MEDICATIONS  (PRN):  acetaminophen   Tablet .. 650 milliGRAM(s) Oral every 6 hours PRN Moderate Pain (4 - 6) Transfer of Care from [facility] to [facility]   78M with a PMHx of DM, HTN, OA BIBA to [facility] for generalized weakness, increased urinary frequency and intermittent dysuria. Patient found to have sepsis secondary to E. coli bacteremia 2/2 UTI on admission, now resolving s/p 2g ceftriaxone daily with surveillance cx NGTD. Hospital course c/b the development of new onset A flutter and globally depressed EF, likely due to the concomitant sepsis and he was stepped up to CCU from [facility] for rate control and further management of the A flutter. He was intubated and given 1.3L NS for the ablation and the intubation was c/b mucous plug and shortness of breath that resolved after replacement of the tube and 40mg IVP lasix. Patient extubated s/p procedure and patient is saturating well on RA and breathing comfortably. He has since been started on AC with Eliquis and rate controlled with Lopressor 25 BID. Hospital course also c/b new onset acute renal failure with creatinine of 3 (baseline in Feb 2018 was 1.8). Patient pending 24 hour urine collection from renal standpoint. Patient to be continued on cipro for E.coli bacteremia until 6/7. In [facility], pt has been in sinus rhythm with some PAC, otherwise HD stable w/o shortness of breath or chest pain and is ready for stepdown to [facility].     OVERNIGHT EVENTS: IVORY     SUBJECTIVE: He reports feeling some chest discomfort after eating today. Ordered EKG and no changes from prior. No shortness of breath.     Vital Signs Last 12 Hrs  T(F): 97.6 (06-01-19 @ 05:00), Max: 98.7 (05-31-19 @ 21:49)  HR: 62 (06-01-19 @ 04:40) (62 - 62)  BP: 145/60 (06-01-19 @ 04:40) (124/59 - 145/60)  BP(mean): 93 (06-01-19 @ 04:40) (90 - 93)  RR: 19 (06-01-19 @ 04:40) (18 - 19)  SpO2: 92% (06-01-19 @ 04:40) (92% - 92%)  I&O's Summary    31 May 2019 07:01  -  01 Jun 2019 07:00  --------------------------------------------------------  IN: 516 mL / OUT: 1400 mL / NET: -884 mL        PHYSICAL EXAM:  Constitutional: obese male in NAD, comfortable in bed.  HEENT: NC/AT, PERRLA, EOMI, no conjunctival pallor or scleral icterus, MMM  Neck: Supple, no JVD  Respiratory: Normal rate, rhythm, depth, effort. CTAB. No w/r/r.   Cardiovascular: RRR, normal S1 and S2, no m/r/g.   Gastrointestinal: +BS, soft NTND, no guarding or rebound tenderness, no palpable masses   : [place] cath in place draining clear yellow urine   Extremities: wwp; no cyanosis, clubbing or edema.   Vascular: Pulses equal and strong throughout.   Neurological: AAOx2 (self and location), no CN deficits, strength and sensation intact throughout.   Skin: No gross skin abnormalities or rashes  Psych: flat affect         LABS:                        11.9   11.17 )-----------( 415      ( 01 Jun 2019 06:27 )             39.1     06-01    142  |  108  |  78<H>  ----------------------------<  127<H>  5.0   |  22  |  2.94<H>    Ca    9.5      01 Jun 2019 06:27  Mg     2.4     06-01    RADIOLOGY & ADDITIONAL TESTS: Reviewed     MEDICATIONS  (STANDING):  amLODIPine   Tablet 10 milliGRAM(s) Oral daily  apixaban 5 milliGRAM(s) Oral every 12 hours  atorvastatin 10 milliGRAM(s) Oral at bedtime  ciprofloxacin     Tablet 500 milliGRAM(s) Oral every 24 hours  docusate sodium 100 milliGRAM(s) Oral two times a day  gabapentin 300 milliGRAM(s) Oral three times a day  insulin glargine Injectable (LANTUS) 20 Unit(s) SubCutaneous at bedtime  insulin lispro (HumaLOG) corrective regimen sliding scale   SubCutaneous Before meals and at bedtime  insulin lispro Injectable (HumaLOG) 4 Unit(s) SubCutaneous three times a day with meals  metoprolol tartrate 25 milliGRAM(s) Oral two times a day  polyethylene glycol 3350 17 Gram(s) Oral daily  senna 2 Tablet(s) Oral at bedtime  sertraline 100 milliGRAM(s) Oral daily    MEDICATIONS  (PRN):  acetaminophen   Tablet .. 650 milliGRAM(s) Oral every 6 hours PRN Moderate Pain (4 - 6)

## 2019-06-01 NOTE — PROGRESS NOTE ADULT - ASSESSMENT
78M with h/o IDDM, HTN and OA who was BIBA to [facility] for generalized weakness, found to have sepsis 2/2 E. coli bacteremia 2/2 UTI, admission c/b new onset atrial flutter, s/p JAROD and ablation of A-flutter on 5/29, now in sinus rhythm with frequent PAC.

## 2019-06-01 NOTE — PROGRESS NOTE ADULT - PROBLEM SELECTOR PLAN 6
Trop 0.07 --> 0.08, asymptomatic, EKG with no acute ST/T-wave changes. Likely secondary to demand ischemia in setting of sepsis/bacteremia  - hx of non obstructive, follows up with Dr. Neil  - continue metoprolol 25mg BID and Atorvastatin 10mg (pravastatin @ home)    #OA  Has chronic OA of bilateral knees, uses cane for ambulation. States his ambulation has been deteriorating with worsening pain. Takes tylenol/motrin daily for pain control. Xrays inpatient c/w OA  - tylenol PRN  -PT recommending ALEYDA but pt refusing. F/u PT re-evaluation

## 2019-06-01 NOTE — PROGRESS NOTE ADULT - PROBLEM SELECTOR PLAN 10
Transition of Care   1) PCP Contacted on Admission: (Y/N) --> Name & Phone #: Dr. Lundberg [phone] [address]  Dr. Neil [phone]  2) Date of Contact with PCP: TBD  3) PCP Contacted at Discharge: (Y/N): TBD  4) Summary of Handoff Given to PCP: TBD  5) Post-Discharge Appointment Date and Location: TBD

## 2019-06-01 NOTE — PROGRESS NOTE ADULT - SUBJECTIVE AND OBJECTIVE BOX
TRANSFER ACCEPTANCE NOTE: 5LA to 7WO    Hospital Course:    Vital Signs Last 24 Hrs  T(C): 37 (01 Jun 2019 13:52), Max: 37.5 (31 May 2019 18:13)  T(F): 98.6 (01 Jun 2019 13:52), Max: 99.5 (31 May 2019 18:13)  HR: 64 (01 Jun 2019 14:23) (62 - 72)  BP: 102/49 (01 Jun 2019 14:23) (102/49 - 145/60)  BP(mean): 70 (01 Jun 2019 14:23) (70 - 104)  RR: 20 (01 Jun 2019 14:23) (18 - 30)  SpO2: 99% (01 Jun 2019 14:23) (91% - 99%)  PHYSICAL EXAM:    Constitutional:  Eyes:  ENMT:  Neck:  Respiratory:  Cardiovascular:  Gastrointestinal:  Extremities:  Vascular:  Neurological:  Musculoskeletal:    MEDICATIONS  (STANDING):  amLODIPine   Tablet 10 milliGRAM(s) Oral daily  apixaban 5 milliGRAM(s) Oral every 12 hours  atorvastatin 10 milliGRAM(s) Oral at bedtime  ciprofloxacin     Tablet 500 milliGRAM(s) Oral every 24 hours  docusate sodium 100 milliGRAM(s) Oral two times a day  gabapentin 300 milliGRAM(s) Oral three times a day  insulin glargine Injectable (LANTUS) 20 Unit(s) SubCutaneous at bedtime  insulin lispro (HumaLOG) corrective regimen sliding scale   SubCutaneous Before meals and at bedtime  insulin lispro Injectable (HumaLOG) 6 Unit(s) SubCutaneous three times a day before meals  metoprolol tartrate 25 milliGRAM(s) Oral two times a day  polyethylene glycol 3350 17 Gram(s) Oral daily  senna 2 Tablet(s) Oral at bedtime  sertraline 100 milliGRAM(s) Oral daily    MEDICATIONS  (PRN):  acetaminophen   Tablet .. 650 milliGRAM(s) Oral every 6 hours PRN Moderate Pain (4 - 6)      Allergies    No Known Allergies    Intolerances        LABS:                        11.9   11.17 )-----------( 415      ( 01 Jun 2019 06:27 )             39.1     06-01    142  |  108  |  78<H>  ----------------------------<  127<H>  5.0   |  22  |  2.94<H>    Ca    9.5      01 Jun 2019 06:27  Mg     2.4     06-01            RADIOLOGY & ADDITIONAL TESTS: TRANSFER ACCEPTANCE NOTE: 5LA to 7WO    Hospital Course:  78M PMH DM, HTN, OA BIBA to [facility] for generalized weakness, increased urinary frequency and intermittent dysuria found to have sepsis 2/2 E. coli bacteremia 2/2 UTI on admission ccb new onset A flutter and globally depressed EF, likely due to the concomitant sepsis thus pt stepped up to CCU from [facility] for rate control and further management. He was intubated and given 1.3L NS for the ablation subsequently developing mucous plug and shortness of breath resolved s/p replacement of the tube and 40mg IVP lasix. Pt successfully ablated and extubated s/p procedure. Started on AC with Eliquis w/ Lopressor 25 BID rate control. Hospital course also c/b new onset acute renal failure with creatinine of 3 (baseline in Feb 2018 was 1.8). Renal consulted and pending 24 hour urine collection. Patient to be continued on cipro for E.coli bacteremia until 6/7. In 5lach, pt has been in sinus rhythm with some PAC, otherwise HD stable w/o shortness of breath or chest pain and accepted for stepdown to F.    ROS: no complaints; denies CP/pressure/tightness, dyspnea, palpitations, abdominal pain, N/V, diarrhea      Vital Signs Last 24 Hrs  T(C): 37 (01 Jun 2019 13:52), Max: 37.5 (31 May 2019 18:13)  T(F): 98.6 (01 Jun 2019 13:52), Max: 99.5 (31 May 2019 18:13)  HR: 64 (01 Jun 2019 14:23) (62 - 72)  BP: 102/49 (01 Jun 2019 14:23) (102/49 - 145/60)  BP(mean): 70 (01 Jun 2019 14:23) (70 - 104)  RR: 20 (01 Jun 2019 14:23) (18 - 30)  SpO2: 99% (01 Jun 2019 14:23) (91% - 99%)  PHYSICAL EXAM:    Constitutional: NAD, WDWN  ENMT: MMM, no JVD  Respiratory: CTA b/l  Cardiovascular: RRR, S1/S2, no M/R/G  Gastrointestinal: BS presents, soft, NTND  Extremities: WWP, no edema in LE b/l  Neurological: AAOx3    MEDICATIONS  (STANDING):  amLODIPine   Tablet 10 milliGRAM(s) Oral daily  apixaban 5 milliGRAM(s) Oral every 12 hours  atorvastatin 10 milliGRAM(s) Oral at bedtime  ciprofloxacin     Tablet 500 milliGRAM(s) Oral every 24 hours  docusate sodium 100 milliGRAM(s) Oral two times a day  gabapentin 300 milliGRAM(s) Oral three times a day  insulin glargine Injectable (LANTUS) 20 Unit(s) SubCutaneous at bedtime  insulin lispro (HumaLOG) corrective regimen sliding scale   SubCutaneous Before meals and at bedtime  insulin lispro Injectable (HumaLOG) 6 Unit(s) SubCutaneous three times a day before meals  metoprolol tartrate 25 milliGRAM(s) Oral two times a day  polyethylene glycol 3350 17 Gram(s) Oral daily  senna 2 Tablet(s) Oral at bedtime  sertraline 100 milliGRAM(s) Oral daily    MEDICATIONS  (PRN):  acetaminophen   Tablet .. 650 milliGRAM(s) Oral every 6 hours PRN Moderate Pain (4 - 6)      Allergies    No Known Allergies    Intolerances        LABS:                        11.9   11.17 )-----------( 415      ( 01 Jun 2019 06:27 )             39.1     06-01    142  |  108  |  78<H>  ----------------------------<  127<H>  5.0   |  22  |  2.94<H>    Ca    9.5      01 Jun 2019 06:27  Mg     2.4     06-01            RADIOLOGY & ADDITIONAL TESTS:

## 2019-06-01 NOTE — PROGRESS NOTE ADULT - PROBLEM SELECTOR PLAN 5
BP remains stable.  -150s  -holding home amlodipine 10mg and enalapril 10mg in setting of sepsis  -continue metoprolol 25mg BID  - Continue to monitor and consider resuming home meds. BP remains stable.  SBP in the 140-150s  - restarted on amlodipine 10mg (5/31)  - holding enalapril in setting of elevated creatinine   - continue metoprolol 25mg BID

## 2019-06-01 NOTE — PROGRESS NOTE ADULT - PROBLEM SELECTOR PLAN 9
VTE ppx: on Eliquis  GI: none  bowel: miralax, senna, colace    FULL CODE  Dispo: telemetry. PT Recs ALEYDA but patient refusing. f/u PT re-evaluation VTE ppx: on Eliquis  GI: none  bowel: miralax, senna, colace    FULL CODE  Dispo: [facility]  PT Recs ALEYDA but patient refusing. f/u PT re-evaluation

## 2019-06-01 NOTE — PROGRESS NOTE ADULT - PROBLEM SELECTOR PLAN 6
Trop 0.07 --> 0.08, asymptomatic, EKG with no acute ST/T-wave changes. Likely secondary to demand ischemia in setting of sepsis/bacteremia  - hx of non obstructive, follows up with Dr. Neil  - continue metoprolol 25mg BID and Atorvastatin 10mg (pravastatin @ home)    #OA  Has chronic OA of bilateral knees, uses cane for ambulation. States his ambulation has been deteriorating with worsening pain. Takes tylenol/motrin daily for pain control. Xrays inpatient c/w OA  -tylenol PRN  -PT recommending ALEYDA but pt refusing. F/u PT re-evaluation Trop 0.07 --> 0.08, asymptomatic, EKG with no acute ST/T-wave changes. Likely secondary to demand ischemia in setting of sepsis/bacteremia  - hx of non obstructive, follows up with Dr. Neil  - continue metoprolol 25mg BID and Atorvastatin 10mg (pravastatin @ home)    #OA  Has chronic OA of bilateral knees, uses cane for ambulation. States his ambulation has been deteriorating with worsening pain. Takes tylenol/motrin daily for pain control. Xrays inpatient c/w OA  - tylenol PRN  -PT recommending ALEYDA but pt refusing. F/u PT re-evaluation

## 2019-06-01 NOTE — PROGRESS NOTE ADULT - PROBLEM SELECTOR PLAN 7
A1c 7.3  - Blood sugars continue to be elevated in the 200-220s  - increased to Lantus 20 units, Lispro 4units and insulin sliding scale     #peripheral neuropathy  2/2 DM2.   -  Home Gabapentin increased to 300mg TID, however pt still with numbness, no pain  -  Continue to monitor and consider increasing the dose or adding second agent    #Depression  - continue Sertraline 100mg qd     #Overactive bladder   - holding home oxybutynin 5mg qd given UTI A1c 7.3  - Blood sugars continue to be elevated in the 200-220s  - increased to Lantus 20 units, Lispro 4units and insulin sliding scale   - continue to monitor sugars and increase lispro as tolerated   #peripheral neuropathy  2/2 DM2.   -  Home Gabapentin increased to 300mg TID, however pt still with numbness, no pain  -  Continue to monitor and consider increasing the dose or adding second agent    #Depression  - continue Sertraline 100mg qd     #Overactive bladder   - holding home oxybutynin 5mg qd given UTI A1c 7.3  - Blood sugars continue to be elevated in the 200-220s  - increased to Lantus 20 units, Lispro 4units and insulin sliding scale   - continue to monitor sugars and increase lispro as tolerated   #peripheral neuropathy  2/2 DM2.   -  Home Gabapentin increased to 300mg TID, however pt still with numbness, no pain  -  Continue to monitor and consider increasing the dose or adding second agent    #Depression  - continue Sertraline 100mg qd   - possible psych consult?     #Overactive bladder   - holding home oxybutynin 5mg qd given UTI

## 2019-06-01 NOTE — PROGRESS NOTE ADULT - PROBLEM SELECTOR PLAN 2
acute on chronic renal failure secondary to poor perfusion from cardiorenal syndrome  stable CKD now with good UO  holding ACEI and diuretics

## 2019-06-01 NOTE — PROGRESS NOTE ADULT - PROBLEM SELECTOR PLAN 7
A1c 7.3  - Blood sugars continue to be elevated in the 200-220s  - Lantus 20 units, Lispro 4units and SSI  - continue to monitor FS    #peripheral neuropathy  2/2 DM2.   -  Home Gabapentin increased to 300mg TID, however pt still with numbness, no pain  -  Continue to monitor and consider increasing the dose or adding second agent    #Depression  - continue Sertraline 100mg qd   - possible psych consult?     #Overactive bladder   - holding home oxybutynin 5mg qd given UTI

## 2019-06-01 NOTE — PROGRESS NOTE ADULT - PROBLEM SELECTOR PLAN 9
VTE ppx: on Eliquis  GI: none  bowel: miralax, senna, colace    FULL CODE  Dispo: [facility]  PT Recs ALEYDA but patient refusing. f/u PT re-evaluation

## 2019-06-01 NOTE — PROGRESS NOTE ADULT - PROBLEM SELECTOR PLAN 4
Pt denies hx of HF but on Furosemide 80mg QD at home. On admission BNP 3296 (received fluid while @ [facility]).   - remains euvolemic on exam. denies SOB, no edema.   - CXR on admission: Mild pulmonary venous congestion  - Echo: moderate global hypokinesis, concentric LVH, EF 35%  -JAROD with mild LVH, global LV hypokinesis, dilated LA, mild-mod MR, moderate plaque in aorta  - s/p 40 Lasix on 5/29 in setting of volume overload; currently euvolemic and CXR showing improvement of effusions  - Holding Lasix and lisinopril for now given MANAN.

## 2019-06-01 NOTE — PROGRESS NOTE ADULT - ASSESSMENT
78M with h/o IDDM, HTN and OA who was BIBA to [facility] for generalized weakness, found to have sepsis 2/2 E. coli bacteremia 2/2 UTI, admission c/b new onset atrial flutter, s/p JAROD and ablation of A-flutter on 5/29, now in sinus rhythm with frequent PAC.

## 2019-06-01 NOTE — PROGRESS NOTE ADULT - SUBJECTIVE AND OBJECTIVE BOX
Pt is s/p Cardiac ablation    now stable in NSR     PAST MEDICAL & SURGICAL HISTORY:  OA (osteoarthritis)  HTN (hypertension)  IDDM (insulin dependent diabetes mellitus)  S/P cholecystectomy  S/P appendectomy  H/O knee surgery      MEDICATIONS  (STANDING):  amLODIPine   Tablet 10 milliGRAM(s) Oral daily  apixaban 5 milliGRAM(s) Oral every 12 hours  atorvastatin 10 milliGRAM(s) Oral at bedtime  ciprofloxacin     Tablet 500 milliGRAM(s) Oral every 24 hours  docusate sodium 100 milliGRAM(s) Oral two times a day  gabapentin 300 milliGRAM(s) Oral three times a day  insulin glargine Injectable (LANTUS) 20 Unit(s) SubCutaneous at bedtime  insulin lispro (HumaLOG) corrective regimen sliding scale   SubCutaneous Before meals and at bedtime  insulin lispro Injectable (HumaLOG) 4 Unit(s) SubCutaneous three times a day with meals  metoprolol tartrate 25 milliGRAM(s) Oral two times a day  polyethylene glycol 3350 17 Gram(s) Oral daily  senna 2 Tablet(s) Oral at bedtime  sertraline 100 milliGRAM(s) Oral daily    MEDICATIONS  (PRN):  acetaminophen   Tablet .. 650 milliGRAM(s) Oral every 6 hours PRN Moderate Pain (4 - 6)      ICU Vital Signs Last 24 Hrs  T(C): 37.2 (01 Jun 2019 09:47), Max: 37.5 (31 May 2019 18:13)  T(F): 99 (01 Jun 2019 09:47), Max: 99.5 (31 May 2019 18:13)  HR: 72 (01 Jun 2019 08:20) (62 - 74)  BP: 119/55 (01 Jun 2019 08:20) (111/67 - 158/70)  BP(mean): 77 (01 Jun 2019 08:20) (77 - 117)  ABP: --  ABP(mean): --  RR: 20 (01 Jun 2019 08:20) (18 - 30)  SpO2: 91% (01 Jun 2019 08:20) (91% - 96%)      Lungs clear  Cv s1 s2  abd soft   ext stable    CXR neg    Culture - Blood (05.23.19 @ 01:47)    Gram Stain:   Growth in aerobic bottle: Gram Negative Rods  Growth in anaerobic bottle: Gram Negative Rods    Specimen Source: .Blood Blood    Culture Results:   Growth in aerobic and anaerobic bottles: Escherichia coli  See previous culture [id number]                                  11.9   11.17 )-----------( 415      ( 01 Jun 2019 06:27 )             39.1   06-01    142  |  108  |  78<H>  ----------------------------<  127<H>  5.0   |  22  |  2.94<H>    Ca    9.5      01 Jun 2019 06:27  Mg     2.4     06-01    Culture - Urine (05.23.19 @ 17:52)    Specimen Source: .Urine None    Culture Results:   No growth

## 2019-06-01 NOTE — PROGRESS NOTE ADULT - PROBLEM SELECTOR PLAN 2
Sepsis (Tm 100.7, WBC 15 on admission) secondary to urinary tract infection. ccb bacteremia with Ecoli, however urine cx negative (s/p abx), surveillance blood cx 5/24 NGTD. CXR without focal consolidation.   -CTX initially now c/w ciprofloxacin until 6/7 for 14d course  - Pt afebrile, however w/ persistent leukocytosis (downtrending at 11)  - CT abd w/o contrast w/ minimal hydronephrosis w/ perinephric stranding, findings with possible pyelonephritis or recently passed kidney stone.  - US retroperitoneal negative for hydronephrosis  - per renal holding oxybutynin as can increase risk of UTI.    #Ecoli bacteremia 2/2 UTI  -abx plan as above

## 2019-06-01 NOTE — PROGRESS NOTE ADULT - PROBLEM SELECTOR PLAN 5
BP remains stable.  SBP in the 140-150s  - restarted on amlodipine 10mg (5/31)  - holding enalapril in setting of elevated creatinine   - continue metoprolol 25mg BID

## 2019-06-02 LAB
ANION GAP SERPL CALC-SCNC: 14 MMOL/L — SIGNIFICANT CHANGE UP (ref 5–17)
BUN SERPL-MCNC: 70 MG/DL — HIGH (ref 7–23)
CALCIUM SERPL-MCNC: 9.2 MG/DL — SIGNIFICANT CHANGE UP (ref 8.4–10.5)
CHLORIDE SERPL-SCNC: 104 MMOL/L — SIGNIFICANT CHANGE UP (ref 96–108)
CO2 SERPL-SCNC: 19 MMOL/L — LOW (ref 22–31)
CREAT SERPL-MCNC: 2.74 MG/DL — HIGH (ref 0.5–1.3)
GLUCOSE BLDC GLUCOMTR-MCNC: 129 MG/DL — HIGH (ref 70–99)
GLUCOSE BLDC GLUCOMTR-MCNC: 132 MG/DL — HIGH (ref 70–99)
GLUCOSE BLDC GLUCOMTR-MCNC: 223 MG/DL — HIGH (ref 70–99)
GLUCOSE BLDC GLUCOMTR-MCNC: 261 MG/DL — HIGH (ref 70–99)
GLUCOSE SERPL-MCNC: 143 MG/DL — HIGH (ref 70–99)
HCT VFR BLD CALC: 37.3 % — LOW (ref 39–50)
HGB BLD-MCNC: 11.5 G/DL — LOW (ref 13–17)
MCHC RBC-ENTMCNC: 25.9 PG — LOW (ref 27–34)
MCHC RBC-ENTMCNC: 30.8 GM/DL — LOW (ref 32–36)
MCV RBC AUTO: 84 FL — SIGNIFICANT CHANGE UP (ref 80–100)
NRBC # BLD: 0 /100 WBCS — SIGNIFICANT CHANGE UP (ref 0–0)
PHOSPHATE SERPL-MCNC: 6.1 MG/DL — HIGH (ref 2.5–4.5)
PLATELET # BLD AUTO: 406 K/UL — HIGH (ref 150–400)
POTASSIUM SERPL-MCNC: 4.9 MMOL/L — SIGNIFICANT CHANGE UP (ref 3.5–5.3)
POTASSIUM SERPL-SCNC: 4.9 MMOL/L — SIGNIFICANT CHANGE UP (ref 3.5–5.3)
RBC # BLD: 4.44 M/UL — SIGNIFICANT CHANGE UP (ref 4.2–5.8)
RBC # FLD: 14.7 % — HIGH (ref 10.3–14.5)
SODIUM SERPL-SCNC: 137 MMOL/L — SIGNIFICANT CHANGE UP (ref 135–145)
WBC # BLD: 9.86 K/UL — SIGNIFICANT CHANGE UP (ref 3.8–10.5)
WBC # FLD AUTO: 9.86 K/UL — SIGNIFICANT CHANGE UP (ref 3.8–10.5)

## 2019-06-02 PROCEDURE — 93010 ELECTROCARDIOGRAM REPORT: CPT

## 2019-06-02 RX ADMIN — ATORVASTATIN CALCIUM 10 MILLIGRAM(S): 80 TABLET, FILM COATED ORAL at 21:21

## 2019-06-02 RX ADMIN — POLYETHYLENE GLYCOL 3350 17 GRAM(S): 17 POWDER, FOR SOLUTION ORAL at 11:13

## 2019-06-02 RX ADMIN — Medication 100 MILLIGRAM(S): at 17:34

## 2019-06-02 RX ADMIN — Medication 6: at 12:35

## 2019-06-02 RX ADMIN — Medication 500 MILLIGRAM(S): at 06:08

## 2019-06-02 RX ADMIN — APIXABAN 5 MILLIGRAM(S): 2.5 TABLET, FILM COATED ORAL at 06:08

## 2019-06-02 RX ADMIN — SENNA PLUS 2 TABLET(S): 8.6 TABLET ORAL at 21:21

## 2019-06-02 RX ADMIN — Medication 650 MILLIGRAM(S): at 21:25

## 2019-06-02 RX ADMIN — SERTRALINE 100 MILLIGRAM(S): 25 TABLET, FILM COATED ORAL at 11:13

## 2019-06-02 RX ADMIN — INSULIN GLARGINE 20 UNIT(S): 100 INJECTION, SOLUTION SUBCUTANEOUS at 21:21

## 2019-06-02 RX ADMIN — Medication 6 UNIT(S): at 17:26

## 2019-06-02 RX ADMIN — GABAPENTIN 300 MILLIGRAM(S): 400 CAPSULE ORAL at 13:45

## 2019-06-02 RX ADMIN — Medication 25 MILLIGRAM(S): at 21:21

## 2019-06-02 RX ADMIN — Medication 4: at 21:23

## 2019-06-02 RX ADMIN — Medication 650 MILLIGRAM(S): at 23:05

## 2019-06-02 RX ADMIN — AMLODIPINE BESYLATE 10 MILLIGRAM(S): 2.5 TABLET ORAL at 06:08

## 2019-06-02 RX ADMIN — GABAPENTIN 300 MILLIGRAM(S): 400 CAPSULE ORAL at 06:08

## 2019-06-02 RX ADMIN — Medication 100 MILLIGRAM(S): at 06:08

## 2019-06-02 RX ADMIN — APIXABAN 5 MILLIGRAM(S): 2.5 TABLET, FILM COATED ORAL at 17:34

## 2019-06-02 RX ADMIN — Medication 6 UNIT(S): at 12:35

## 2019-06-02 RX ADMIN — GABAPENTIN 300 MILLIGRAM(S): 400 CAPSULE ORAL at 21:21

## 2019-06-02 RX ADMIN — Medication 25 MILLIGRAM(S): at 11:13

## 2019-06-02 RX ADMIN — Medication 6 UNIT(S): at 08:55

## 2019-06-02 NOTE — PROGRESS NOTE ADULT - PROBLEM SELECTOR PLAN 1
New Aflutter during admission, 2 to 1. HR improves to 90s, on Tele can bump to 120s. s/p JAROD and ablation of A-flutter on 5/29, now in sinus rhythm with PACs  - JAROD with mild LVH, global LV hypokinesis, dilated LA, mild-mod MR, moderate plaque in aorta  - c/w lopressor to 25mg BID   - c/w Eliquis 5mg Po BID.  - daily EKGs  - pending repeat echo    #Respiratory distress - Pt with respiratory distress upon arrival to CCU s/p ablation. Likely 2/2 to pulmonary vascular congestion in the setting of receiving 1.3L NS during procedure exacerbated by cardiac insufficiency.   -RESOLVED - s/p 40mg IV lasix  -now comfortable on RA

## 2019-06-02 NOTE — PROGRESS NOTE ADULT - PROBLEM SELECTOR PLAN 7
A1c 7.3  - Lantus 20 units, Lispro 6units premeal and SSI  - continue to monitor FS    #peripheral neuropathy  2/2 DM2.   -  Home Gabapentin increased to 300mg TID, however pt still with numbness, no pain  -  Continue to monitor and consider increasing the dose or adding second agent    #Depression  - continue Sertraline 100mg qd   - possible psych consult?     #Overactive bladder   - holding home oxybutynin 5mg qd given UTI

## 2019-06-02 NOTE — PROGRESS NOTE ADULT - PROBLEM SELECTOR PLAN 6
Trop 0.07 --> 0.08, asymptomatic, EKG with no acute ST/T-wave changes. Likely secondary to demand ischemia in setting of sepsis/bacteremia  - hx of non obstructive, follows up with Dr. Neil  - continue metoprolol 25mg BID and Atorvastatin 10mg (pravastatin @ home)    #OA  Has chronic OA of bilateral knees, uses cane for ambulation. States his ambulation has been deteriorating with worsening pain. Takes tylenol/motrin daily for pain control. Xrays inpatient c/w OA  - tylenol PRN  - PT recommending ALEYDA but pt refusing. F/u PT re-evaluation

## 2019-06-02 NOTE — PROGRESS NOTE ADULT - PROBLEM SELECTOR PLAN 3
sCr 3.67 on admission (baseline Creat in February 2018 was 1.8 as per PCP records) FEUREA 49.6% (36.2% today) suggestive of intrarenal  - Cr downtrending  - Hold home Enalapril and Lasix  - retroperitoneal US: B/L renal cysts measuring up to 5.5 cm on the right and 5.1cm on the left, negative for hydronephrosis  - appreciate renal recs

## 2019-06-02 NOTE — PROGRESS NOTE ADULT - ASSESSMENT
78M PMH DM, HTN and OA who was BIBA to [facility] admitted to [facility] sepsis 2/2 E. coli bacteremia 2/2 UTI, stepped up to CCU for new onset atrial flutter s/p JAROD and ablation of A-flutter on 5/29, now in sinus rhythm with frequent PAC now stepped down to F for further medical management.

## 2019-06-02 NOTE — PROGRESS NOTE ADULT - SUBJECTIVE AND OBJECTIVE BOX
OVERNIGHT EVENTS: No acute events overnight.     SUBJECTIVE / INTERVAL HPI: Patient seen and examined at bedside. He currently denies any symptoms including chest pain, SOB, arrythmia, f/c, n/v/d.     VITAL SIGNS:  Vital Signs Last 24 Hrs  T(C): 36.6 (02 Jun 2019 11:06), Max: 37.2 (01 Jun 2019 20:45)  T(F): 97.9 (02 Jun 2019 11:06), Max: 98.9 (01 Jun 2019 20:45)  HR: 68 (02 Jun 2019 11:06) (63 - 69)  BP: 136/68 (02 Jun 2019 11:06) (102/49 - 149/68)  BP(mean): 70 (01 Jun 2019 14:23) (70 - 70)  RR: 18 (02 Jun 2019 11:06) (18 - 20)  SpO2: 95% (02 Jun 2019 11:06) (92% - 99%)    06-01-19 @ 07:01  -  06-02-19 @ 07:00  --------------------------------------------------------  IN: 654 mL / OUT: 2700 mL / NET: -2046 mL    PHYSICAL EXAM:  General: WDWN, no acute distress  HEENT: NC/AT; anicteric sclera; MMM  Neck: supple, no JVD  Cardiovascular: +S1/S2, RRR, no m/r/g  Respiratory: CTA B/L; no W/R/R  Gastrointestinal: soft, NT/ND  Extremities: WWP; no edema, clubbing or cyanosis  Vascular: 2+ radial, DP/PT pulses B/L  Neurological: AAOx3; no focal deficits    MEDICATIONS:  MEDICATIONS  (STANDING):  amLODIPine   Tablet 10 milliGRAM(s) Oral daily  apixaban 5 milliGRAM(s) Oral every 12 hours  atorvastatin 10 milliGRAM(s) Oral at bedtime  ciprofloxacin     Tablet 500 milliGRAM(s) Oral every 24 hours  docusate sodium 100 milliGRAM(s) Oral two times a day  gabapentin 300 milliGRAM(s) Oral three times a day  insulin glargine Injectable (LANTUS) 20 Unit(s) SubCutaneous at bedtime  insulin lispro (HumaLOG) corrective regimen sliding scale   SubCutaneous Before meals and at bedtime  insulin lispro Injectable (HumaLOG) 6 Unit(s) SubCutaneous three times a day before meals  metoprolol tartrate 25 milliGRAM(s) Oral two times a day  polyethylene glycol 3350 17 Gram(s) Oral daily  senna 2 Tablet(s) Oral at bedtime  sertraline 100 milliGRAM(s) Oral daily    MEDICATIONS  (PRN):  acetaminophen   Tablet .. 650 milliGRAM(s) Oral every 6 hours PRN Moderate Pain (4 - 6)    ALLERGIES:  Allergies  No Known Allergies    LABS:                        11.5   9.86  )-----------( 406      ( 02 Jun 2019 07:01 )             37.3     06-02    137  |  104  |  70<H>  ----------------------------<  143<H>  4.9   |  19<L>  |  2.74<H>    Ca    9.2      02 Jun 2019 07:01  Phos  6.1     06-02  Mg     2.4     06-01    CAPILLARY BLOOD GLUCOSE  POCT Blood Glucose.: 261 mg/dL (02 Jun 2019 12:11)    RADIOLOGY & ADDITIONAL TESTS: Reviewed.  EKG: NSR,

## 2019-06-02 NOTE — PROGRESS NOTE ADULT - ASSESSMENT
s/p Bacteremia     s/p A Fin Cardiomyopathy    Pt is indecisive  re transfer to Rehab or Home rehab     CAITLYN Mccauley

## 2019-06-02 NOTE — PROGRESS NOTE ADULT - PROBLEM SELECTOR PLAN 4
Pt denies hx of HF but on Furosemide 80mg QD at home. On admission BNP 3296 (received fluid while @ [facility]).   - remains euvolemic on exam. denies SOB, no edema.   - CXR on admission: Mild pulmonary venous congestion  - Echo: moderate global hypokinesis, concentric LVH, EF 35%  -JAROD with mild LVH, global LV hypokinesis, dilated LA, mild-mod MR, moderate plaque in aorta  - s/p 40 Lasix on 5/29 in setting of volume overload; currently euvolemic and CXR showing improvement of effusions  - Holding Lasix and lisinopril for now given MANAN.

## 2019-06-02 NOTE — PROGRESS NOTE ADULT - SUBJECTIVE AND OBJECTIVE BOX
CC: WEAKNESS      INTERVAL HISTORY: no complaints      ROS: No chest pain, no sob, no abd pain. No n/v/d    PAST MEDICAL & SURGICAL HISTORY:  OA (osteoarthritis)  HTN (hypertension)  IDDM (insulin dependent diabetes mellitus)  S/P cholecystectomy  S/P appendectomy  H/O knee surgery  No significant past surgical history      PHYSICAL EXAM:  T(C): 36.6 (06-02-19 @ 05:39), Max: 37.2 (06-01-19 @ 09:47)  HR: 63 (06-02-19 @ 05:39)  BP: 136/82 (06-02-19 @ 05:39) (102/49 - 149/68)  RR: 19 (06-02-19 @ 05:39)  SpO2: 95% (06-02-19 @ 05:39)  Wt(kg): --  I&O's Summary    01 Jun 2019 07:01  -  02 Jun 2019 07:00  --------------------------------------------------------  IN: 654 mL / OUT: 2700 mL / NET: -2046 mL      Weight   General: AAO x 3,  NAD.  HEENT: moist mucous membranes, no pallor/cyanosis.  Neck: no JVD visible.  Cardiac: S1, S2. RRR. No murmurs   Respratory: CTA b/l, no access muscle use.   Abdomen: soft. nontender. nondistended  Skin: no rashes.  Extremities: no LE edema b/l  Access:       DATA:                        11.5<L>  9.86  )-----------( 406<H>    ( 02 Jun 2019 07:01 )             37.3<L>    Ferritin, Serum: 223 ng/mL (05-22 @ 22:25)      137    |  104    |  70<H>  ----------------------------<  143<H>  Ca:9.2   (02 Jun 2019 07:01)  4.9     |  19<L>  |  2.74<H>      eGFR if Non : 22 <L>  eGFR if : 25 <L>                        MEDICATIONS  (STANDING):  amLODIPine   Tablet 10 milliGRAM(s) Oral daily  apixaban 5 milliGRAM(s) Oral every 12 hours  atorvastatin 10 milliGRAM(s) Oral at bedtime  ciprofloxacin     Tablet 500 milliGRAM(s) Oral every 24 hours  docusate sodium 100 milliGRAM(s) Oral two times a day  gabapentin 300 milliGRAM(s) Oral three times a day  insulin glargine Injectable (LANTUS) 20 Unit(s) SubCutaneous at bedtime  insulin lispro (HumaLOG) corrective regimen sliding scale   SubCutaneous Before meals and at bedtime  insulin lispro Injectable (HumaLOG) 6 Unit(s) SubCutaneous three times a day before meals  metoprolol tartrate 25 milliGRAM(s) Oral two times a day  polyethylene glycol 3350 17 Gram(s) Oral daily  senna 2 Tablet(s) Oral at bedtime  sertraline 100 milliGRAM(s) Oral daily    MEDICATIONS  (PRN):  acetaminophen   Tablet .. 650 milliGRAM(s) Oral every 6 hours PRN Moderate Pain (4 - 6)

## 2019-06-02 NOTE — PROGRESS NOTE ADULT - SUBJECTIVE AND OBJECTIVE BOX
s/p Cardiac Ablation  A Fib to NSR   stable    Echo EF 35 %     PAST MEDICAL & SURGICAL HISTORY:  OA (osteoarthritis)  HTN (hypertension)  IDDM (insulin dependent diabetes mellitus)  S/P cholecystectomy  S/P appendectomy  H/O knee surgery      MEDICATIONS  (STANDING):  amLODIPine   Tablet 10 milliGRAM(s) Oral daily  apixaban 5 milliGRAM(s) Oral every 12 hours  atorvastatin 10 milliGRAM(s) Oral at bedtime  ciprofloxacin     Tablet 500 milliGRAM(s) Oral every 24 hours  docusate sodium 100 milliGRAM(s) Oral two times a day  gabapentin 300 milliGRAM(s) Oral three times a day  insulin glargine Injectable (LANTUS) 20 Unit(s) SubCutaneous at bedtime  insulin lispro (HumaLOG) corrective regimen sliding scale   SubCutaneous Before meals and at bedtime  insulin lispro Injectable (HumaLOG) 6 Unit(s) SubCutaneous three times a day before meals  metoprolol tartrate 25 milliGRAM(s) Oral two times a day  polyethylene glycol 3350 17 Gram(s) Oral daily  senna 2 Tablet(s) Oral at bedtime  sertraline 100 milliGRAM(s) Oral daily    MEDICATIONS  (PRN):  acetaminophen   Tablet .. 650 milliGRAM(s) Oral every 6 hours PRN Moderate Pain (4 - 6)    ICU Vital Signs Last 24 Hrs  T(C): 36.6 (02 Jun 2019 11:06), Max: 37.2 (01 Jun 2019 20:45)  T(F): 97.9 (02 Jun 2019 11:06), Max: 98.9 (01 Jun 2019 20:45)  HR: 68 (02 Jun 2019 11:06) (63 - 70)  BP: 136/68 (02 Jun 2019 11:06) (102/49 - 149/68)  BP(mean): 70 (01 Jun 2019 14:23) (70 - 83)  ABP: --  ABP(mean): --  RR: 18 (02 Jun 2019 11:06) (18 - 20)  SpO2: 95% (02 Jun 2019 11:06) (92% - 99%)      lungs decreased breath sounds at bases  CV s1 s2  abd soft  Ext stable                          11.5   9.86  )-----------( 406      ( 02 Jun 2019 07:01 )             37.3   06-02    137  |  104  |  70<H>  ----------------------------<  143<H>  4.9   |  19<L>  |  2.74<H>    Ca    9.2      02 Jun 2019 07:01  Phos  6.1     06-02  Mg     2.4     06-01

## 2019-06-02 NOTE — PROGRESS NOTE ADULT - ASSESSMENT
78M with h/o IDDM, HTN and OA who was BIBA to [facility] for generalized weakness, found to have sepsis 2/2 E. coli bacteremia 2/2 UTI, admission c/b new onset atrial flutter, s/p JAROD and ablation of A-flutter on 5/29, now in sinus rhythm with frequent PAC.

## 2019-06-02 NOTE — PROGRESS NOTE ADULT - PROBLEM SELECTOR PLAN 2
Sepsis (Tm 100.7, WBC 15 on admission) secondary to urinary tract infection. ccb bacteremia with Ecoli, however urine cx negative (s/p abx), surveillance blood cx 5/24 NGTD. CXR without focal consolidation.   - CTX initially, now c/w ciprofloxacin until 6/7 for 14d course  - Pt afebrile, however w/ persistent leukocytosis (downtrending at 11)  - CT abd w/o contrast w/ minimal hydronephrosis w/ perinephric stranding, findings with possible pyelonephritis or recently passed kidney stone.  - US retroperitoneal negative for hydronephrosis  - per renal holding oxybutynin as can increase risk of UTI.    #Ecoli bacteremia 2/2 UTI  -abx plan as above

## 2019-06-02 NOTE — PROGRESS NOTE ADULT - PROBLEM SELECTOR PLAN 10
Transition of Care   1) PCP Contacted on Admission: (Y/N) --> Name & Phone #: Dr. Lundberg [phone] [address]  Dr. Neil [phone]  2) Date of Contact with PCP: TBD  3) PCP Contacted at Discharge: (Y/N): TBD  4) Summary of Handoff Given to PCP: TBD  5) Post-Discharge Appointment Date and Location: TBD

## 2019-06-03 LAB
ANION GAP SERPL CALC-SCNC: 13 MMOL/L — SIGNIFICANT CHANGE UP (ref 5–17)
BUN SERPL-MCNC: 70 MG/DL — HIGH (ref 7–23)
CALCIUM SERPL-MCNC: 9.6 MG/DL — SIGNIFICANT CHANGE UP (ref 8.4–10.5)
CHLORIDE SERPL-SCNC: 105 MMOL/L — SIGNIFICANT CHANGE UP (ref 96–108)
CO2 SERPL-SCNC: 20 MMOL/L — LOW (ref 22–31)
CREAT SERPL-MCNC: 2.61 MG/DL — HIGH (ref 0.5–1.3)
GLUCOSE BLDC GLUCOMTR-MCNC: 126 MG/DL — HIGH (ref 70–99)
GLUCOSE BLDC GLUCOMTR-MCNC: 141 MG/DL — HIGH (ref 70–99)
GLUCOSE BLDC GLUCOMTR-MCNC: 258 MG/DL — HIGH (ref 70–99)
GLUCOSE BLDC GLUCOMTR-MCNC: 275 MG/DL — HIGH (ref 70–99)
GLUCOSE SERPL-MCNC: 139 MG/DL — HIGH (ref 70–99)
HCT VFR BLD CALC: 38.3 % — LOW (ref 39–50)
HGB BLD-MCNC: 11.8 G/DL — LOW (ref 13–17)
MAGNESIUM SERPL-MCNC: 2.1 MG/DL — SIGNIFICANT CHANGE UP (ref 1.6–2.6)
MCHC RBC-ENTMCNC: 25.9 PG — LOW (ref 27–34)
MCHC RBC-ENTMCNC: 30.8 GM/DL — LOW (ref 32–36)
MCV RBC AUTO: 84.2 FL — SIGNIFICANT CHANGE UP (ref 80–100)
NRBC # BLD: 0 /100 WBCS — SIGNIFICANT CHANGE UP (ref 0–0)
PHOSPHATE SERPL-MCNC: 5.8 MG/DL — HIGH (ref 2.5–4.5)
PLATELET # BLD AUTO: 394 K/UL — SIGNIFICANT CHANGE UP (ref 150–400)
POTASSIUM SERPL-MCNC: 4.8 MMOL/L — SIGNIFICANT CHANGE UP (ref 3.5–5.3)
POTASSIUM SERPL-SCNC: 4.8 MMOL/L — SIGNIFICANT CHANGE UP (ref 3.5–5.3)
RBC # BLD: 4.55 M/UL — SIGNIFICANT CHANGE UP (ref 4.2–5.8)
RBC # FLD: 14.4 % — SIGNIFICANT CHANGE UP (ref 10.3–14.5)
SODIUM SERPL-SCNC: 138 MMOL/L — SIGNIFICANT CHANGE UP (ref 135–145)
WBC # BLD: 10.63 K/UL — HIGH (ref 3.8–10.5)
WBC # FLD AUTO: 10.63 K/UL — HIGH (ref 3.8–10.5)

## 2019-06-03 RX ORDER — VENLAFAXINE HCL 75 MG
37.5 CAPSULE, EXT RELEASE 24 HR ORAL DAILY
Refills: 0 | Status: DISCONTINUED | OUTPATIENT
Start: 2019-06-03 | End: 2019-06-04

## 2019-06-03 RX ORDER — LORATADINE 10 MG/1
10 TABLET ORAL DAILY
Refills: 0 | Status: DISCONTINUED | OUTPATIENT
Start: 2019-06-03 | End: 2019-06-04

## 2019-06-03 RX ADMIN — APIXABAN 5 MILLIGRAM(S): 2.5 TABLET, FILM COATED ORAL at 05:53

## 2019-06-03 RX ADMIN — AMLODIPINE BESYLATE 10 MILLIGRAM(S): 2.5 TABLET ORAL at 05:54

## 2019-06-03 RX ADMIN — Medication 25 MILLIGRAM(S): at 21:25

## 2019-06-03 RX ADMIN — GABAPENTIN 300 MILLIGRAM(S): 400 CAPSULE ORAL at 21:25

## 2019-06-03 RX ADMIN — Medication 650 MILLIGRAM(S): at 16:53

## 2019-06-03 RX ADMIN — GABAPENTIN 300 MILLIGRAM(S): 400 CAPSULE ORAL at 05:54

## 2019-06-03 RX ADMIN — INSULIN GLARGINE 20 UNIT(S): 100 INJECTION, SOLUTION SUBCUTANEOUS at 21:25

## 2019-06-03 RX ADMIN — Medication 6 UNIT(S): at 08:43

## 2019-06-03 RX ADMIN — Medication 650 MILLIGRAM(S): at 08:48

## 2019-06-03 RX ADMIN — LORATADINE 10 MILLIGRAM(S): 10 TABLET ORAL at 16:24

## 2019-06-03 RX ADMIN — SERTRALINE 100 MILLIGRAM(S): 25 TABLET, FILM COATED ORAL at 12:54

## 2019-06-03 RX ADMIN — Medication 25 MILLIGRAM(S): at 10:34

## 2019-06-03 RX ADMIN — ATORVASTATIN CALCIUM 10 MILLIGRAM(S): 80 TABLET, FILM COATED ORAL at 21:25

## 2019-06-03 RX ADMIN — Medication 37.5 MILLIGRAM(S): at 18:26

## 2019-06-03 RX ADMIN — Medication 100 MILLIGRAM(S): at 05:54

## 2019-06-03 RX ADMIN — SENNA PLUS 2 TABLET(S): 8.6 TABLET ORAL at 21:25

## 2019-06-03 RX ADMIN — Medication 500 MILLIGRAM(S): at 05:54

## 2019-06-03 RX ADMIN — Medication 6 UNIT(S): at 12:54

## 2019-06-03 RX ADMIN — Medication 6: at 12:55

## 2019-06-03 RX ADMIN — Medication 650 MILLIGRAM(S): at 17:50

## 2019-06-03 RX ADMIN — APIXABAN 5 MILLIGRAM(S): 2.5 TABLET, FILM COATED ORAL at 17:52

## 2019-06-03 RX ADMIN — GABAPENTIN 300 MILLIGRAM(S): 400 CAPSULE ORAL at 14:58

## 2019-06-03 RX ADMIN — Medication 650 MILLIGRAM(S): at 10:28

## 2019-06-03 RX ADMIN — Medication 6: at 21:25

## 2019-06-03 RX ADMIN — Medication 6 UNIT(S): at 17:52

## 2019-06-03 RX ADMIN — Medication 100 MILLIGRAM(S): at 17:52

## 2019-06-03 NOTE — PROGRESS NOTE ADULT - ASSESSMENT
78M with h/o IDDM, HTN and OA who was BIBA to [facility] for generalized weakness, found to have sepsis 2/2 E. coli bacteremia 2/2 UTI, admission c/b new onset atrial flutter, s/p JAROD and ablation of A-flutter on 5/29. Nephrology consulted for MANAN , improving .     Acute kidney injury   -Pt with MANAN on CKD  -Etiology on MANAN likely in the setting of urosepsis - UTI with E.coli - treated with Ceftriaxone versus  possible worsening of the renal function from cardiorenal syndrome - A flutter / Cardiomyopathy    -Cr 3.67 on admission (baseline Creat in February 2018 was 1.8 as per PCP records) F  - Cr downtrending  - Hold home Enalapril and Lasix  -renal will continue to follow.

## 2019-06-03 NOTE — PROGRESS NOTE ADULT - PROBLEM SELECTOR PLAN 10
1) PCP Contacted on Admission: (Y/N) --> Dr. Lundberg [phone], Dr. Neil [phone]  2) Date of Contact with PCP:   3) PCP Contacted at Discharge: (Y/N): TBD  4) Summary of Handoff Given to PCP: TBD  5) Post-Discharge Appointment Date and Location: D

## 2019-06-03 NOTE — CHART NOTE - NSCHARTNOTEFT_GEN_A_CORE
Admitting Diagnosis:   Patient is a 78y old  Male who presents with a chief complaint of Generalized weakness (2019 14:11)      PAST MEDICAL & SURGICAL HISTORY:  OA (osteoarthritis)  HTN (hypertension)  IDDM (insulin dependent diabetes mellitus)  S/P cholecystectomy  S/P appendectomy  H/O knee surgery      Current Nutrition Order: DASH/TLC + CCD No Snacks        PO Intake: Good (%) [  x ]  Fair (50-75%) [ x  ] Poor (<25%) [   ]- dislikes food     GI Issues: no noted n/v/d/c    Pain: no pain noted at this time     Skin Integrity: incision     Labs:       138  |  105  |  70<H>  ----------------------------<  139<H>  4.8   |  20<L>  |  2.61<H>    Ca    9.6      2019 07:49  Phos  5.8     -  Mg     2.1     -      CAPILLARY BLOOD GLUCOSE      POCT Blood Glucose.: 275 mg/dL (2019 11:58)  POCT Blood Glucose.: 126 mg/dL (2019 08:37)  POCT Blood Glucose.: 223 mg/dL (2019 21:12)  POCT Blood Glucose.: 129 mg/dL (2019 16:56)      Medications:  MEDICATIONS  (STANDING):  amLODIPine   Tablet 10 milliGRAM(s) Oral daily  apixaban 5 milliGRAM(s) Oral every 12 hours  atorvastatin 10 milliGRAM(s) Oral at bedtime  ciprofloxacin     Tablet 500 milliGRAM(s) Oral every 24 hours  docusate sodium 100 milliGRAM(s) Oral two times a day  gabapentin 300 milliGRAM(s) Oral three times a day  insulin glargine Injectable (LANTUS) 20 Unit(s) SubCutaneous at bedtime  insulin lispro (HumaLOG) corrective regimen sliding scale   SubCutaneous Before meals and at bedtime  insulin lispro Injectable (HumaLOG) 6 Unit(s) SubCutaneous three times a day before meals  loratadine 10 milliGRAM(s) Oral daily  metoprolol tartrate 25 milliGRAM(s) Oral two times a day  polyethylene glycol 3350 17 Gram(s) Oral daily  senna 2 Tablet(s) Oral at bedtime  sertraline 100 milliGRAM(s) Oral daily    MEDICATIONS  (PRN):  acetaminophen   Tablet .. 650 milliGRAM(s) Oral every 6 hours PRN Moderate Pain (4 - 6)      Weight:   100kg ()   90.8kg ()     Weight Change: -10kg likely in setting of fluid losses/ retention     Nutrition Focused Physical Exam: Completed [   ]  Not Pertinent [ x  ]    Estimated energy needs:   Ht:5ft 7inches,IBW:148lbs +/-10%,149% of IBW/BMI:34.5.  IBW used to calculate EER as per pt's current body weight >120% of IBW   Estimated nutrient needs based on [facility] SOC for maintenance in older adults  20-25 kcal/[id number] kcal/d  1.0-1.2 gm/k-80 gm protein/d  25-30 mL/[id number] mL fluids/d    Subjective:   79 y/o M admitted with fevers/LHGV with generalized weakness and found to have sepsis 2/2 E. coli bacteremia 2/2 UTI. Transferred to [facility] for further management of Aflutter. NPO for JAROD/Ablation with EP. Of note, Pt with poor to fair adherence to diabetic diet therapy, but tries to avoid concentrated sweets and limit desserts. Seen pt in room, tolerating diet well, endorses good appetite. S/p midline placement for IV abx. Discharge planning to [facility] in progress, accepted to facility. Moved to 7WO for further monitoring / while pending DC. Reports good intake at this time however dislikes food.     Previous Nutrition Diagnosis:  Food - and nutrition - related knowledge deficit; Disordered eating pattern; Limited adherence to nutrition - related recommendations R/T suspected no formal MNT education AEB: diet recall and HGBA1C    Active [ x ]  Resolved [   ]    If resolved, new PES:     Goal: Meet >75% EER from PO without any intolerances, teach back diet modifications for diet therapy, verbalize 2-3 diet recs     Recommendations:  1. Reinforce ed as needed   2. Manage pain prn   3. Monitor and replete lytes prn   4. Encourage intake     Education: encouraged intake and reinforced diet restrictions     Risk Level: High [   ] Moderate [ x  ] Low [   ]

## 2019-06-03 NOTE — PROGRESS NOTE ADULT - SUBJECTIVE AND OBJECTIVE BOX
Pt is going for repeat ECHO  post cardiac ablation to NSR     PAST MEDICAL & SURGICAL HISTORY:  OA (osteoarthritis)  HTN (hypertension)  IDDM (insulin dependent diabetes mellitus)  S/P cholecystectomy  S/P appendectomy  H/O knee surgery      MEDICATIONS  (STANDING):  amLODIPine   Tablet 10 milliGRAM(s) Oral daily  apixaban 5 milliGRAM(s) Oral every 12 hours  atorvastatin 10 milliGRAM(s) Oral at bedtime  ciprofloxacin     Tablet 500 milliGRAM(s) Oral every 24 hours  docusate sodium 100 milliGRAM(s) Oral two times a day  gabapentin 300 milliGRAM(s) Oral three times a day  insulin glargine Injectable (LANTUS) 20 Unit(s) SubCutaneous at bedtime  insulin lispro (HumaLOG) corrective regimen sliding scale   SubCutaneous Before meals and at bedtime  insulin lispro Injectable (HumaLOG) 6 Unit(s) SubCutaneous three times a day before meals  metoprolol tartrate 25 milliGRAM(s) Oral two times a day  polyethylene glycol 3350 17 Gram(s) Oral daily  senna 2 Tablet(s) Oral at bedtime  sertraline 100 milliGRAM(s) Oral daily    MEDICATIONS  (PRN):  acetaminophen   Tablet .. 650 milliGRAM(s) Oral every 6 hours PRN Moderate Pain (4 - 6)    ICU Vital Signs Last 24 Hrs  T(C): 37 (03 Jun 2019 05:51), Max: 37.1 (02 Jun 2019 21:15)  T(F): 98.6 (03 Jun 2019 05:51), Max: 98.8 (02 Jun 2019 21:15)  HR: 65 (03 Jun 2019 05:51) (65 - 69)  BP: 169/95 (03 Jun 2019 05:51) (136/68 - 169/95)  BP(mean): --  ABP: --  ABP(mean): --  RR: 18 (03 Jun 2019 05:51) (18 - 18)  SpO2: 95% (03 Jun 2019 05:51) (95% - 97%)      Lungs clear  CV s1 s2  abd soft  ext stable                          11.8   10.63 )-----------( 394      ( 03 Jun 2019 07:49 )             38.3   06-03    138  |  105  |  70<H>  ----------------------------<  139<H>  4.8   |  20<L>  |  2.61<H>    Ca    9.6      03 Jun 2019 07:49  Phos  5.8     06-03  Mg     2.1     06-03        echo  5  23    EF   35 %

## 2019-06-03 NOTE — PROGRESS NOTE ADULT - ASSESSMENT
77yo M with PMH of T2DM, HTN and OA, who was BIBA to [facility] 5/22, admitted to [facility] sepsis 2/2 E. coli bacteremia 2/2 UTI, subsequently stepped up to CCU for new-onset atrial flutter, now s/p JAROD and ablation on 5/29, in sinus rhythm, stepped back down to F for continued medical management.    Problem/Plan - 1:  ·  Problem: Atrial flutter.  Plan: Patient had new onset of Aflutter during admission, 2 to 1, with HR 90-120s, now s/p JAROD and ablation of on 5/29, currently in sinus rhythm with PACs.   - repeat echo 6/3 with EF improved to 50%, diastolic dysfunction, and mild global LV hypokinesis  - continue Lopressor 25mg BID   - continue Eliquis 5mg PO BID  - daily EKGs.     Problem/Plan - 2:  ·  Problem: Sepsis.  Plan: Resolved. Patient previously with sepsis on admission 2/2 UTI, complicated by E. coli bacteremia. CT with minimal hydronephrosis with perinephric stranding, consistent with possible pyelonephritis or recently passed kidney stone. Surveillance blood cultures negative.   - continue ciprofloxacin for total 14 days coverage (5/22-6/4)  - holding oxybutynin per renal recs as can increase risk of UTI.     Problem/Plan - 3:  ·  Problem: Acute kidney injury superimposed on CKD.  Plan: Cr elevated to 3.67 on admission, last baseline 1.8 in 02/2018. FeUrea 49.6%, suggestive of intrarenal disease.  - Cr remains elevated above baseline but downtrending  - hold home enalapril and Lasix  - avoid nephrotoxic meds  - appreciate renal recs.     Problem/Plan - 4:  ·  Problem: Acute systolic congestive heart failure.  Plan: Patient denies history of known CHF but is on Lasix 80mg qd at home. BNP elevated on admission to 3296.   - repeat echo with improved EF 50%, mild global LV hypokinesis  - patient remains euvolemic on exam  - continue Lopressor 25mg BID  - hold Lasix and enalapril for MANAN as above.     Problem/Plan - 5:  ·  Problem: HTN (hypertension).  Plan: - continue amlodipine 10mg   - continue Lopressor 25mg BID  - holding enalapril, Lasix, as above.     Problem/Plan - 6:  Problem: CAD (coronary artery disease). Plan: History of non-obstructive CAD, follows up with Dr. Neil as an outpatient.  - continue metoprolol tartrate 25mg BID  - continue atorvastatin 10mg qhs as TI for home pravastatin    #OA  Has chronic OA of bilateral knees, uses cane for ambulation. States his ambulation has been deteriorating with worsening pain. Takes Tylenol/Motrin daily for pain control. X-rays inpatient consistent with OA.  - Tylenol PRN      Problem/Plan - 7:  ·  Problem: IDDM (insulin dependent diabetes mellitus).  Plan: A1c 7.3  - continue lantus 20u qhs, Lispro 6u TIDAC, MISS  - continue to monitor FSG    #Peripheral neuropathy  - increased home gabapentin to 300mg TID, but patient still complaining of LE paresthesias    #Depression  - continue sertraline 100mg qd     #Overactive bladder   - holding home oxybutynin 5mg qd given UTI as above.     Problem/Plan - 8:  ·  Problem: Nutrition, metabolism, and development symptoms.  Plan: F: none  E: replete PRN  N: DASH/TLC, CCD.     Problem/Plan - 9:  ·  Problem: Prophylactic measure.  Plan: VTE ppx: on Eliquis    Full Code

## 2019-06-03 NOTE — PROGRESS NOTE ADULT - PROBLEM SELECTOR PLAN 4
Patient denies history of known CHF but is on Lasix 80mg qd at home. BNP elevated on admission to 3296.   - repeat echo with improved EF 50%, mild global LV hypokinesis  - patient remains euvolemic on exam  - continue Lopressor 25mg BID  - hold Lasix and enalapril for MANAN as above

## 2019-06-03 NOTE — PROGRESS NOTE ADULT - PROBLEM SELECTOR PLAN 7
A1c 7.3  - continue lantus 20u qhs, Lispro 6u TIDAC, MISS  - continue to monitor FSG    #Peripheral neuropathy  - increased home gabapentin to 300mg TID, but patient still complaining of LE paresthesias    #Depression  - continue sertraline 100mg qd     #Overactive bladder   - holding home oxybutynin 5mg qd given UTI as above

## 2019-06-03 NOTE — PROGRESS NOTE ADULT - SUBJECTIVE AND OBJECTIVE BOX
Physical Medicine and Rehabilitation Progress Note:    Patient is a 78y old  Male who presents with a chief complaint of generalized weakness (03 Jun 2019 13:41)      HPI:  78M with h/o IDDM, HTN, OA BIBA to [facility] for generalized weakness since waking up this morning. He noticed having increased urinary frequency for the last two days and intermittent dysuria today. Denies hx of BPH or discomfort with voiding. Denies nasal congestion, increased sputum production or cough. Patient lives alone since his wife passed away last year, has no children. He used to work at a ware-house, currently retired. He is a former smoker, quit smoking like 15 years ago. Of note, patient was at [facility] ED four days ago after sliding off his cough and falling on his knee. X-ray showed b/l OA of the knees. Pt uses cane at baseline for ambulation. He has taking Tylenol and Motrin daily for pain control.      At [facility], vitals Tm 100.7, P95, 165/78, R18 - R22, Saturation 93%RA -> 95% on 2L NC. Pt received ceftriaxone 1g, azithromycin 500mg, Lasix 20mg iv, 3L NS, Tylenol 650mg. Pt transferred to [facility] for further care.     ROS: Has increased urinary frequency and occasional dysuria. Reports chronic back pain and b/l knee pain. Denies fever, chills, SOB, CP, palpitations, abdominal pain, n/v or diarrhea. Last BM yesterday. (22 May 2019 21:46)                            11.8   10.63 )-----------( 394      ( 03 Jun 2019 07:49 )             38.3       06-03    138  |  105  |  70<H>  ----------------------------<  139<H>  4.8   |  20<L>  |  2.61<H>    Ca    9.6      03 Jun 2019 07:49  Phos  5.8     06-03  Mg     2.1     06-03      Vital Signs Last 24 Hrs  T(C): 36.7 (03 Jun 2019 10:33), Max: 37.1 (02 Jun 2019 21:15)  T(F): 98 (03 Jun 2019 10:33), Max: 98.8 (02 Jun 2019 21:15)  HR: 70 (03 Jun 2019 10:33) (65 - 70)  BP: 139/69 (03 Jun 2019 10:33) (139/69 - 169/95)  BP(mean): --  RR: 18 (03 Jun 2019 10:33) (18 - 18)  SpO2: 93% (03 Jun 2019 10:33) (93% - 97%)    MEDICATIONS  (STANDING):  amLODIPine   Tablet 10 milliGRAM(s) Oral daily  apixaban 5 milliGRAM(s) Oral every 12 hours  atorvastatin 10 milliGRAM(s) Oral at bedtime  ciprofloxacin     Tablet 500 milliGRAM(s) Oral every 24 hours  docusate sodium 100 milliGRAM(s) Oral two times a day  gabapentin 300 milliGRAM(s) Oral three times a day  insulin glargine Injectable (LANTUS) 20 Unit(s) SubCutaneous at bedtime  insulin lispro (HumaLOG) corrective regimen sliding scale   SubCutaneous Before meals and at bedtime  insulin lispro Injectable (HumaLOG) 6 Unit(s) SubCutaneous three times a day before meals  metoprolol tartrate 25 milliGRAM(s) Oral two times a day  polyethylene glycol 3350 17 Gram(s) Oral daily  senna 2 Tablet(s) Oral at bedtime  sertraline 100 milliGRAM(s) Oral daily    MEDICATIONS  (PRN):  acetaminophen   Tablet .. 650 milliGRAM(s) Oral every 6 hours PRN Moderate Pain (4 - 6)    Currently Undergoing Physical Therapy at bedside.    Functional Status Assessment:      Pain Assessment/Number Scale (0-10) Adult  Presence of Pain: complains of pain/discomfort  Body Location: Bilateral:   ankles and feet  Pain Rating (0-10): Activity: 10   Pain Precipitating/Aggravating Factors: weight bearing  Pain Alleviating Factors: non weight bearing    Therapeutic Interventions      Bed Mobility  Bed Mobility Training Sit-to-Supine: moderate assist (50% patient effort);  minimum assist (75% patient effort);  2 person assist  Bed Mobility Training Supine-to-Sit: moderate assist (50% patient effort);  2 person assist  Bed Mobility Training Limitations: decreased ability to use arms for pushing/pulling;  decreased ability to use legs for bridging/pushing;  pain;  decreased strength    Sit-Stand Transfer Training  Transfer Training Sit-to-Stand Transfer: moderate assist (50% patient effort);  2 person assist;  rolling walker  Transfer Training Stand-to-Sit Transfer: minimum assist (75% patient effort);  2 person assist;  rolling walker  Sit-to-Stand Transfer Training Transfer Safety Analysis: decreased weight-shifting ability;  pain;  rolling walker    Therapeutic Exercise  Therapeutic Exercise Detail: Ankle pumps 10x each LE             PM&R Impression: as above    Disposition Plan: accepted to [facility] subacute rehab

## 2019-06-03 NOTE — PROGRESS NOTE ADULT - PROBLEM SELECTOR PLAN 2
Resolved. Patient previously with sepsis on admission 2/2 UTI, complicated by E. coli bacteremia. CT with minimal hydronephrosis with perinephric stranding, consistent with possible pyelonephritis or recently passed kidney stone. Surveillance blood cultures negative.   - continue ciprofloxacin for total 14 days coverage (5/22-6/4)  - holding oxybutynin per renal recs as can increase risk of UTI

## 2019-06-03 NOTE — PROGRESS NOTE ADULT - ASSESSMENT
s.p  JERICA Fib  Cardiomyopathy  Cardiac ablation   for repeat ECHO  to assess LVF in NSR     s/p Bacteremia     CAITLYN Mccauley

## 2019-06-03 NOTE — PROGRESS NOTE ADULT - ASSESSMENT
79yo M with PMH of T2DM, HTN and OA, who was BIBA to [facility] 5/22, admitted to [facility] sepsis 2/2 E. coli bacteremia 2/2 UTI, subsequently stepped up to CCU for new-onset atrial flutter, now s/p JAROD and ablation on 5/29, in sinus rhythm, stepped back down to [facility] for continued medical management.

## 2019-06-03 NOTE — PROGRESS NOTE ADULT - PROBLEM SELECTOR PLAN 6
History of non-obstructive CAD, follows up with Dr. Neil as an outpatient.  - continue metoprolol tartrate 25mg BID  - continue atorvastatin 10mg qhs as TI for home pravastatin    #OA  Has chronic OA of bilateral knees, uses cane for ambulation. States his ambulation has been deteriorating with worsening pain. Takes Tylenol/Motrin daily for pain control. X-rays inpatient consistent with OA.  - Tylenol PRN  - PT recommending ALEYDA but patient refusing  - f/u PT re-evaluation

## 2019-06-03 NOTE — PROGRESS NOTE ADULT - PROBLEM SELECTOR PROBLEM 4
HTN (hypertension)
Acute systolic congestive heart failure
HTN (hypertension)
Acute kidney injury superimposed on CKD
Acute systolic congestive heart failure
HTN (hypertension)

## 2019-06-03 NOTE — PROGRESS NOTE ADULT - PROBLEM SELECTOR PROBLEM 1
Sepsis
Atrial flutter
Atrial flutter
Sepsis
Sepsis
Atrial flutter
Sepsis
Atrial flutter
Sepsis

## 2019-06-03 NOTE — PROGRESS NOTE ADULT - SUBJECTIVE AND OBJECTIVE BOX
Nephrology progress note   78M with h/o IDDM, HTN and OA who was BIBA to [facility] for generalized weakness, found to have sepsis 2/2 E. coli bacteremia 2/2 UTI, admission c/b new onset atrial flutter, s/p JAROD and ablation of A-flutter on 5/29. Nephrology consulted for MANAN , improving .   VITAL SIGNS:  Vital Signs Last 24 Hrs  T(C): 36.7 (03 Jun 2019 10:33), Max: 37.1 (02 Jun 2019 21:15)  T(F): 98 (03 Jun 2019 10:33), Max: 98.8 (02 Jun 2019 21:15)  HR: 70 (03 Jun 2019 10:33) (65 - 70)  BP: 139/69 (03 Jun 2019 10:33) (139/69 - 169/95)  BP(mean): --  RR: 18 (03 Jun 2019 10:33) (18 - 18)  SpO2: 93% (03 Jun 2019 10:33) (93% - 97%)    PHYSICAL EXAM:    General: WDWN  HEENT: NC/AT; PERRL, anicteric sclera; MMM  Neck: supple  Cardiovascular: +S1/S2; RRR  Respiratory: CTA B/L; no W/R/R  Gastrointestinal: soft, NT/ND; +BSx4  Extremities: WWP; no edema, clubbing or cyanosis  Vascular: 2+ radial, DP/PT pulses B/L  Neurological: AAOx3; no focal deficits  gu: condom cath in place     MEDICATIONS:  MEDICATIONS  (STANDING):  amLODIPine   Tablet 10 milliGRAM(s) Oral daily  apixaban 5 milliGRAM(s) Oral every 12 hours  atorvastatin 10 milliGRAM(s) Oral at bedtime  ciprofloxacin     Tablet 500 milliGRAM(s) Oral every 24 hours  docusate sodium 100 milliGRAM(s) Oral two times a day  gabapentin 300 milliGRAM(s) Oral three times a day  insulin glargine Injectable (LANTUS) 20 Unit(s) SubCutaneous at bedtime  insulin lispro (HumaLOG) corrective regimen sliding scale   SubCutaneous Before meals and at bedtime  insulin lispro Injectable (HumaLOG) 6 Unit(s) SubCutaneous three times a day before meals  metoprolol tartrate 25 milliGRAM(s) Oral two times a day  polyethylene glycol 3350 17 Gram(s) Oral daily  senna 2 Tablet(s) Oral at bedtime  sertraline 100 milliGRAM(s) Oral daily    MEDICATIONS  (PRN):  acetaminophen   Tablet .. 650 milliGRAM(s) Oral every 6 hours PRN Moderate Pain (4 - 6)      ALLERGIES:  Allergies    No Known Allergies    Intolerances        LABS:                        11.8   10.63 )-----------( 394      ( 03 Jun 2019 07:49 )             38.3     06-03    138  |  105  |  70<H>  ----------------------------<  139<H>  4.8   |  20<L>  |  2.61<H>    Ca    9.6      03 Jun 2019 07:49  Phos  5.8     06-03  Mg     2.1     06-03          CAPILLARY BLOOD GLUCOSE      POCT Blood Glucose.: 126 mg/dL (03 Jun 2019 08:37)      RADIOLOGY & ADDITIONAL TESTS: Reviewed.    ASSESSMENT:    PLAN:

## 2019-06-03 NOTE — PROGRESS NOTE ADULT - SUBJECTIVE AND OBJECTIVE BOX
OVERNIGHT EVENTS: No acute events overnight.  INTERVAL HISTORY: Patient complains of itching across his back this morning, which has been persistent over the past few days, as well as paresthesias in his feet. Denies chest pain or palpitations, shortness of breath, abdominal pain, nausea, vomiting, or dizziness/lightheadedness.       Vital Signs Last 24 Hrs  T(C): 36.7 (03 Jun 2019 10:33), Max: 37.1 (02 Jun 2019 21:15)  T(F): 98 (03 Jun 2019 10:33), Max: 98.8 (02 Jun 2019 21:15)  HR: 70 (03 Jun 2019 10:33) (65 - 70)  BP: 139/69 (03 Jun 2019 10:33) (139/69 - 169/95)  RR: 18 (03 Jun 2019 10:33) (18 - 18)  SpO2: 93% (03 Jun 2019 10:33) (93% - 97%)      I&O's Summary    02 Jun 2019 07:01  -  03 Jun 2019 07:00  --------------------------------------------------------  IN: 0 mL / OUT: 2100 mL / NET: -2100 mL      PHYSICAL EXAM:  General: NAD, elderly male, appears comfortable, cooperative with exam  HEENT: NCAT, PERRL, EOMI, no conjunctival pallor or scleral icterus, MMM  Neck: supple, no LAD or thyromegaly, no JVD  Respiratory: no increased work of breathing, CTAB, no w/r/r   Cardiovascular: RRR, normal S1/S2, no m/r/g   Abdominal: soft, moderately distended, non-tender, +BS  Extremities: WWP, no cyanosis, clubbing or edema  Vascular: radial pulses and DP 2+ bilaterally   Neurological: AAOx3, no CN deficits, strength and sensation intact throughout  Skin: no gross skin abnormalities or rashes      LABS:                        11.8   10.63 )-----------( 394      ( 03 Jun 2019 07:49 )             38.3     06-03    138  |  105  |  70<H>  ----------------------------<  139<H>  4.8   |  20<L>  |  2.61<H>    Ca    9.6      03 Jun 2019 07:49  Phos  5.8     06-03  Mg     2.1     06-03      RADIOLOGY & ADDITIONAL TESTS:  Echocardiogram (06.03.19 @ 09:56)  Interpretation Summary  Normal left ventricular size and wall thickness. There is borderline global hypokinesis of the left ventricle. The left ventricular ejection fraction is 50%.  The left atrial size is normal. Right atrial size is normal. The mitral inflow pattern is consistent with impaired left ventricular relaxation with mildly elevated left atrial pressure (8-14mmHg).  The right ventricle is normal in size and function. There is mild aortic valve thickening. There is mild aortic regurgitation. There is trace mitral regurgitation. There is trace tricuspid regurgitation. There was insufficient TR detected from which to calculate pulmonary artery systolic pressure.  No aortic root dilatation. The inferior vena cava is normal in size (<2.1 cm) with normal inspiratory collapse (>50%) consistent with normal right atrial pressure.  There is no pericardial effusion.      MEDICATIONS  (STANDING):  amLODIPine   Tablet 10 milliGRAM(s) Oral daily  apixaban 5 milliGRAM(s) Oral every 12 hours  atorvastatin 10 milliGRAM(s) Oral at bedtime  ciprofloxacin     Tablet 500 milliGRAM(s) Oral every 24 hours  docusate sodium 100 milliGRAM(s) Oral two times a day  gabapentin 300 milliGRAM(s) Oral three times a day  insulin glargine Injectable (LANTUS) 20 Unit(s) SubCutaneous at bedtime  insulin lispro (HumaLOG) corrective regimen sliding scale   SubCutaneous Before meals and at bedtime  insulin lispro Injectable (HumaLOG) 6 Unit(s) SubCutaneous three times a day before meals  metoprolol tartrate 25 milliGRAM(s) Oral two times a day  polyethylene glycol 3350 17 Gram(s) Oral daily  senna 2 Tablet(s) Oral at bedtime  sertraline 100 milliGRAM(s) Oral daily    MEDICATIONS  (PRN):  acetaminophen   Tablet .. 650 milliGRAM(s) Oral every 6 hours PRN Moderate Pain (4 - 6)

## 2019-06-03 NOTE — PROGRESS NOTE ADULT - PROBLEM SELECTOR PLAN 1
Patient had new onset of Aflutter during admission, 2 to 1, with HR 90-120s, now s/p JAROD and ablation of on 5/29, currently in sinus rhythm with PACs.   - repeat echo 6/3 with EF improved to 50%, diastolic dysfunction, and mild global LV hypokinesis  - continue Lopressor 25mg BID   - continue Eliquis 5mg PO BID  - daily EKGs

## 2019-06-03 NOTE — PROGRESS NOTE ADULT - PROBLEM SELECTOR PLAN 3
Cr elevated to 3.67 on admission, last baseline 1.8 in 02/2018. FeUrea 49.6%, suggestive of intrarenal disease.  - Cr remains elevated above baseline but downtrending  - hold home enalapril and Lasix  - avoid nephrotoxic meds  - appreciate renal recs

## 2019-06-04 ENCOUNTER — TRANSCRIPTION ENCOUNTER (OUTPATIENT)
Age: 79
End: 2019-06-04

## 2019-06-04 VITALS
HEART RATE: 60 BPM | DIASTOLIC BLOOD PRESSURE: 82 MMHG | SYSTOLIC BLOOD PRESSURE: 135 MMHG | TEMPERATURE: 97 F | OXYGEN SATURATION: 94 % | RESPIRATION RATE: 15 BRPM

## 2019-06-04 LAB
ANION GAP SERPL CALC-SCNC: 14 MMOL/L — SIGNIFICANT CHANGE UP (ref 5–17)
BUN SERPL-MCNC: 62 MG/DL — HIGH (ref 7–23)
CALCIUM SERPL-MCNC: 9.6 MG/DL — SIGNIFICANT CHANGE UP (ref 8.4–10.5)
CHLORIDE SERPL-SCNC: 103 MMOL/L — SIGNIFICANT CHANGE UP (ref 96–108)
CO2 SERPL-SCNC: 21 MMOL/L — LOW (ref 22–31)
CREAT SERPL-MCNC: 2.59 MG/DL — HIGH (ref 0.5–1.3)
GLUCOSE BLDC GLUCOMTR-MCNC: 124 MG/DL — HIGH (ref 70–99)
GLUCOSE BLDC GLUCOMTR-MCNC: 198 MG/DL — HIGH (ref 70–99)
GLUCOSE SERPL-MCNC: 129 MG/DL — HIGH (ref 70–99)
HCT VFR BLD CALC: 38.5 % — LOW (ref 39–50)
HGB BLD-MCNC: 11.7 G/DL — LOW (ref 13–17)
MAGNESIUM SERPL-MCNC: 2.2 MG/DL — SIGNIFICANT CHANGE UP (ref 1.6–2.6)
MCHC RBC-ENTMCNC: 25.7 PG — LOW (ref 27–34)
MCHC RBC-ENTMCNC: 30.4 GM/DL — LOW (ref 32–36)
MCV RBC AUTO: 84.6 FL — SIGNIFICANT CHANGE UP (ref 80–100)
NRBC # BLD: 0 /100 WBCS — SIGNIFICANT CHANGE UP (ref 0–0)
PHOSPHATE SERPL-MCNC: 5.6 MG/DL — HIGH (ref 2.5–4.5)
PLATELET # BLD AUTO: 408 K/UL — HIGH (ref 150–400)
POTASSIUM SERPL-MCNC: 5 MMOL/L — SIGNIFICANT CHANGE UP (ref 3.5–5.3)
POTASSIUM SERPL-SCNC: 5 MMOL/L — SIGNIFICANT CHANGE UP (ref 3.5–5.3)
RBC # BLD: 4.55 M/UL — SIGNIFICANT CHANGE UP (ref 4.2–5.8)
RBC # FLD: 14.6 % — HIGH (ref 10.3–14.5)
SODIUM SERPL-SCNC: 138 MMOL/L — SIGNIFICANT CHANGE UP (ref 135–145)
WBC # BLD: 9.67 K/UL — SIGNIFICANT CHANGE UP (ref 3.8–10.5)
WBC # FLD AUTO: 9.67 K/UL — SIGNIFICANT CHANGE UP (ref 3.8–10.5)

## 2019-06-04 PROCEDURE — 99232 SBSQ HOSP IP/OBS MODERATE 35: CPT | Mod: GC

## 2019-06-04 RX ORDER — APIXABAN 2.5 MG/1
1 TABLET, FILM COATED ORAL
Qty: 0 | Refills: 0 | DISCHARGE
Start: 2019-06-04

## 2019-06-04 RX ORDER — ATORVASTATIN CALCIUM 80 MG/1
1 TABLET, FILM COATED ORAL
Qty: 0 | Refills: 0 | DISCHARGE
Start: 2019-06-04

## 2019-06-04 RX ORDER — LORATADINE 10 MG/1
1 TABLET ORAL
Qty: 0 | Refills: 0 | DISCHARGE
Start: 2019-06-04

## 2019-06-04 RX ORDER — INSULIN NPH HUM/REG INSULIN HM 70-30/ML
10 VIAL (ML) SUBCUTANEOUS
Qty: 0 | Refills: 0 | DISCHARGE

## 2019-06-04 RX ORDER — SOLIFENACIN SUCCINATE 10 MG/1
1 TABLET ORAL
Qty: 0 | Refills: 0 | DISCHARGE

## 2019-06-04 RX ORDER — FUROSEMIDE 40 MG
1 TABLET ORAL
Qty: 0 | Refills: 0 | DISCHARGE

## 2019-06-04 RX ORDER — METOPROLOL TARTRATE 50 MG
1 TABLET ORAL
Qty: 0 | Refills: 0 | DISCHARGE
Start: 2019-06-04

## 2019-06-04 RX ORDER — POLYETHYLENE GLYCOL 3350 17 G/17G
17 POWDER, FOR SOLUTION ORAL
Qty: 0 | Refills: 0 | DISCHARGE
Start: 2019-06-04

## 2019-06-04 RX ORDER — CIPROFLOXACIN LACTATE 400MG/40ML
1 VIAL (ML) INTRAVENOUS
Qty: 0 | Refills: 0 | DISCHARGE
Start: 2019-06-04 | End: 2019-06-07

## 2019-06-04 RX ORDER — INSULIN LISPRO 100/ML
6 VIAL (ML) SUBCUTANEOUS
Qty: 0 | Refills: 0 | DISCHARGE
Start: 2019-06-04

## 2019-06-04 RX ORDER — DOCUSATE SODIUM 100 MG
1 CAPSULE ORAL
Qty: 0 | Refills: 0 | DISCHARGE
Start: 2019-06-04

## 2019-06-04 RX ORDER — METFORMIN HYDROCHLORIDE 850 MG/1
1 TABLET ORAL
Qty: 0 | Refills: 0 | DISCHARGE

## 2019-06-04 RX ORDER — REPAGLINIDE 1 MG/1
0 TABLET ORAL
Qty: 0 | Refills: 0 | DISCHARGE

## 2019-06-04 RX ORDER — VENLAFAXINE HCL 75 MG
1 CAPSULE, EXT RELEASE 24 HR ORAL
Qty: 0 | Refills: 0 | DISCHARGE
Start: 2019-06-04

## 2019-06-04 RX ORDER — SENNA PLUS 8.6 MG/1
2 TABLET ORAL
Qty: 0 | Refills: 0 | DISCHARGE
Start: 2019-06-04

## 2019-06-04 RX ORDER — INSULIN GLARGINE 100 [IU]/ML
20 INJECTION, SOLUTION SUBCUTANEOUS
Qty: 0 | Refills: 0 | DISCHARGE
Start: 2019-06-04

## 2019-06-04 RX ADMIN — Medication 500 MILLIGRAM(S): at 06:29

## 2019-06-04 RX ADMIN — Medication 25 MILLIGRAM(S): at 09:45

## 2019-06-04 RX ADMIN — Medication 37.5 MILLIGRAM(S): at 12:51

## 2019-06-04 RX ADMIN — Medication 100 MILLIGRAM(S): at 05:46

## 2019-06-04 RX ADMIN — SERTRALINE 100 MILLIGRAM(S): 25 TABLET, FILM COATED ORAL at 12:51

## 2019-06-04 RX ADMIN — Medication 2: at 12:51

## 2019-06-04 RX ADMIN — AMLODIPINE BESYLATE 10 MILLIGRAM(S): 2.5 TABLET ORAL at 05:46

## 2019-06-04 RX ADMIN — GABAPENTIN 300 MILLIGRAM(S): 400 CAPSULE ORAL at 13:42

## 2019-06-04 RX ADMIN — Medication 6 UNIT(S): at 12:51

## 2019-06-04 RX ADMIN — Medication 6 UNIT(S): at 08:51

## 2019-06-04 RX ADMIN — APIXABAN 5 MILLIGRAM(S): 2.5 TABLET, FILM COATED ORAL at 05:44

## 2019-06-04 RX ADMIN — LORATADINE 10 MILLIGRAM(S): 10 TABLET ORAL at 12:51

## 2019-06-04 RX ADMIN — GABAPENTIN 300 MILLIGRAM(S): 400 CAPSULE ORAL at 05:44

## 2019-06-04 NOTE — DISCHARGE NOTE PROVIDER - NSDCFUADDAPPT_GEN_ALL_CORE_FT
Follow up with your PCP and cardiologist Dr. Mccauley in clinic following your discharge from [facility] for continued management of your medical problems.

## 2019-06-04 NOTE — PROGRESS NOTE ADULT - SUBJECTIVE AND OBJECTIVE BOX
Patient is a 78y Male seen and evaluated at bedside.   pt seen and examined  no complaints  labs noted      acetaminophen   Tablet .. 650 every 6 hours PRN  amLODIPine   Tablet 10 daily  apixaban 5 every 12 hours  atorvastatin 10 at bedtime  ciprofloxacin     Tablet 500 every 24 hours  docusate sodium 100 two times a day  gabapentin 300 three times a day  insulin glargine Injectable (LANTUS) 20 at bedtime  insulin lispro (HumaLOG) corrective regimen sliding scale  Before meals and at bedtime  insulin lispro Injectable (HumaLOG) 6 three times a day before meals  loratadine 10 daily  metoprolol tartrate 25 two times a day  polyethylene glycol 3350 17 daily  senna 2 at bedtime  sertraline 100 daily  venlafaxine 37.5 daily      Allergies    No Known Allergies    Intolerances        T(C): , Max: 37.2 (06-03-19 @ 17:16)  T(F): , Max: 99 (06-03-19 @ 17:16)  HR: 60 (06-04-19 @ 12:44)  BP: 128/73 (06-04-19 @ 12:44)  BP(mean): --  RR: 16 (06-04-19 @ 12:44)  SpO2: 95% (06-04-19 @ 12:44)  Wt(kg): --    06-03 @ 07:01  -  06-04 @ 07:00  --------------------------------------------------------  IN: 0 mL / OUT: 700 mL / NET: -700 mL          Review of Systems:  CONSTITUTIONAL: No fever or chills, No fatigue or tiredness.  EYES: No blurred or double vision.  RESPIRATORY: No shortness of breath, cough, hemoptysis  CARDIOVASCULAR: No Chest pain or shortness of breath  GASTROINTESTINAL: NO abdominal or flank pain, No nausea or vomiting, No diarrhea  GENITOURINARY: No dysuria or urinary burning, No difficulty passing urine, No hematuria  NEUROLOGICAL: No headaches or blurred vision  SKIN: No skin rashes   MUSCULOSKELETAL: No arthralgia, Joint pain, leg edema, No muscle pains      PHYSICAL EXAM:  GENERAL: NAD,  HEAD:  Atraumatic, Normocephalic,   EYES: Bilateral conjunctiva and sclera normal   Oral cavity: Oral mucosa dry and pink  NECK: Neck supple, No JVD  CHEST/LUNG: rales at base  HEART: Regular rate and rhythm. TONIA II/VI at LPSB, No gallop, no rub   ABDOMEN: Soft, Nontender, BS+nt, No flank tenderness.   EXTREMITIES: No clubbing, cyanosis, or edema  Neurology: AAOx3, no focal neurological deficit  SKIN: No rashes or lesions            LABS:                        11.7   9.67  )-----------( 408      ( 04 Jun 2019 07:16 )             38.5     06-04    138  |  103  |  62<H>  ----------------------------<  129<H>  5.0   |  21<L>  |  2.59<H>    Ca    9.6      04 Jun 2019 07:16  Phos  5.6     06-04  Mg     2.2     06-04                  RADIOLOGY & ADDITIONAL STUDIES:

## 2019-06-04 NOTE — DISCHARGE NOTE PROVIDER - NSDCCPCAREPLAN_GEN_ALL_CORE_FT
PRINCIPAL DISCHARGE DIAGNOSIS  Diagnosis: UTI (urinary tract infection)  Assessment and Plan of Treatment: You came into the hospital with weakness and you were found to have a urinary tract infection. The bacteria causing this infection also caused an infection in your bloodstream. You were started on antibiotics for this infection, which you will need to continue taking until June 7.      SECONDARY DISCHARGE DIAGNOSES  Diagnosis: Renal insufficiency  Assessment and Plan of Treatment: You have impaired kidney function, which has worsened since your last admission, and is likely related to your high blood pressure and diabetes. Follow up with your PCP for continued monitoring of this condition.    Diagnosis: Acute systolic congestive heart failure  Assessment and Plan of Treatment: An echocardiogram, which is an ultrasound of your heart, shows an impaired ability of your heart to pump normally. Continue to take metoprolol as prescribed to help with this condition, and follow up with Dr. Mccauley in clinic.    Diagnosis: Atrial flutter  Assessment and Plan of Treatment: During this admission, you were found to have an abnormal heart rhythm, called atrial flutter, which was likely caused by your underlying urinary tract and bloodstream infection. You were monitored in the cardiac intensive care unit, and had a procedure done called an ablation, which restored your normal heart rhythm. You were started on two medications for this, called Eliquis and metoprolol, which you will continue taking after discharge. Follow up with Dr. Mccauley for continued management.    Diagnosis: IDDM (insulin dependent diabetes mellitus)  Assessment and Plan of Treatment: Continue taking insulin as prescribed to manage your diabetes, and follow up with your primary care doctor after discharge from rehab. Continue to take gabapentin and venlafaxine for management of your peripheral neuropathy.    Diagnosis: Essential hypertension  Assessment and Plan of Treatment: Continue taking amlodipine and metoprolol as prescribed for management of your blood pressure.

## 2019-06-04 NOTE — DISCHARGE NOTE PROVIDER - HOSPITAL COURSE
77yo M with PMH of T2DM, HTN, and OA, who was BIBA to [facility] on 5/22 for generalized weakness, increased urinary frequency and intermittent dysuria, found to have sepsis 2/2 E. coli bacteremia 2/2 UTI. Hospital course complicated by new-onset atrial flutter and globally depressed EF, likely 2/2 sepsis, and patient stepped up to CCU. He was intubated and underwent JAROD and ablation. Patient extubated successfully after ablation. He was started on Eliquis and metoprolol tartrate and remained in NSR. Hospital course also complicated by new onset acute renal failure with creatinine of 3 (baseline in Feb 2018 was 1.8), which has slowly been downtrending. He was stepped back down to the floor and has remained stable. He is being discharged to [facility] and is to be continued on cipro for E.coli bacteremia until 6/7.

## 2019-06-04 NOTE — PROGRESS NOTE ADULT - SUBJECTIVE AND OBJECTIVE BOX
s/p   Cardiac   ablation for A Fib Flutter    PAST MEDICAL & SURGICAL HISTORY:  OA (osteoarthritis)  HTN (hypertension)  IDDM (insulin dependent diabetes mellitus)  S/P cholecystectomy  S/P appendectomy  H/O knee surgery    MEDICATIONS  (STANDING):  amLODIPine   Tablet 10 milliGRAM(s) Oral daily  apixaban 5 milliGRAM(s) Oral every 12 hours  atorvastatin 10 milliGRAM(s) Oral at bedtime  ciprofloxacin     Tablet 500 milliGRAM(s) Oral every 24 hours  docusate sodium 100 milliGRAM(s) Oral two times a day  gabapentin 300 milliGRAM(s) Oral three times a day  insulin glargine Injectable (LANTUS) 20 Unit(s) SubCutaneous at bedtime  insulin lispro (HumaLOG) corrective regimen sliding scale   SubCutaneous Before meals and at bedtime  insulin lispro Injectable (HumaLOG) 6 Unit(s) SubCutaneous three times a day before meals  loratadine 10 milliGRAM(s) Oral daily  metoprolol tartrate 25 milliGRAM(s) Oral two times a day  polyethylene glycol 3350 17 Gram(s) Oral daily  senna 2 Tablet(s) Oral at bedtime  sertraline 100 milliGRAM(s) Oral daily  venlafaxine 37.5 milliGRAM(s) Oral daily    MEDICATIONS  (PRN):  acetaminophen   Tablet .. 650 milliGRAM(s) Oral every 6 hours PRN Moderate Pain (4 - 6)  ICU Vital Signs Last 24 Hrs  T(C): 36.6 (04 Jun 2019 09:38), Max: 37.2 (03 Jun 2019 17:16)  T(F): 97.8 (04 Jun 2019 09:38), Max: 99 (03 Jun 2019 17:16)  HR: 70 (04 Jun 2019 09:38) (60 - 70)  BP: 171/75 (04 Jun 2019 09:38) (128/77 - 171/75)  BP(mean): --  ABP: --  ABP(mean): --  RR: 18 (04 Jun 2019 09:38) (16 - 18)  SpO2: 95% (04 Jun 2019 09:38) (93% - 95%)      Lungs clear  CV s1 s2  abd soft   Ext stable    CXR  Clear lungs     echo    EF 50 %   borderline global hypokinesis                           11.7   9.67  )-----------( 408      ( 04 Jun 2019 07:16 )             38.5   06-04    138  |  103  |  62<H>  ----------------------------<  129<H>  5.0   |  21<L>  |  2.59<H>    Ca    9.6      04 Jun 2019 07:16  Phos  5.6     06-04  Mg     2.2     06-04

## 2019-06-04 NOTE — DISCHARGE NOTE PROVIDER - CARE PROVIDER_API CALL
Negro Mccauley)  Medicine  [address] [place] [address]  Phone: [phone]  Fax: [phone]  Follow Up Time: Routine

## 2019-06-04 NOTE — PROGRESS NOTE ADULT - ASSESSMENT
78M with h/o IDDM, HTN and OA who was BIBA to [facility] for generalized weakness, found to have sepsis 2/2 E. coli bacteremia 2/2 UTI, admission c/b new onset atrial flutter, s/p JAROD and ablation of A-flutter on 5/29. Nephrology consulted for MANAN , improving .     Acute kidney injury   -Pt with MANAN on CKD  -Etiology of MANAN includes ATN from sepsis versus hemodyanamic from afib/aflutter  cr stable @ 2.59  - Hold home ACEI: enalapril  volume status -wet-- recommend resuming home lasix   should have labs checked as outpatient  renal low K diet    anemia  h.h @ 11.7/38.5  iron studies noted--> should be on feosol 325 mg po daily    htn  fairly controlled  on amlodipine 10 mg, metoprolol 25 mg bID  low sodium diet    renal bone disease  calcium and phos @ 10.56 and 5.6 respectively  recommend pth, vit D checked as outpt

## 2019-06-04 NOTE — PROGRESS NOTE ADULT - REASON FOR ADMISSION
Generalized weakness

## 2019-06-04 NOTE — PROGRESS NOTE ADULT - ASSESSMENT
hypertension cont Norvasc and Metoprolol    Arrhythmia resolved with Cardiac Ablation       Renal Insufficiency   diabetic nephropathy     renal follow up          For ALEYDA

## 2019-06-04 NOTE — PROGRESS NOTE ADULT - PROVIDER SPECIALTY LIST ADULT
CCU
Cardiology
Electrophysiology
Electrophysiology
Internal Medicine
Internal Medicine
Nephrology
Rehab Medicine
Cardiology
Rehab Medicine
Cardiology
Internal Medicine
Cardiology
Internal Medicine

## 2019-06-04 NOTE — DISCHARGE NOTE NURSING/CASE MANAGEMENT/SOCIAL WORK - NSDCFUADDAPPT_GEN_ALL_CORE_FT
Follow up with your PCP and cardiologist Dr. Mccauley in clinic following your discharge from [facility] for continued management of your medical problems.

## 2019-06-04 NOTE — DISCHARGE NOTE NURSING/CASE MANAGEMENT/SOCIAL WORK - NSDCPEPT PROEDHF_GEN_ALL_CORE
Low salt diet/Call primary care provider for follow up after discharge/Activities as tolerated/Monitor weight daily/Report signs and symptoms to primary care provider

## 2019-06-04 NOTE — PROGRESS NOTE ADULT - ATTENDING COMMENTS
I independently performed the key portions of the evaluation and management service provided. I agree with the above history, physical, and plan which I have reviewed and edited where appropriate. I find MANAN stable. BP controlled.  Follow SCr. Continue anti-hypertensives. See full note.
See nephrology fellow's note. See today's note and consult note. I independently performed the key portions of the evaluation and management service provided. I agree with the above history, physical, and plan which I have reviewed and edited where appropriate. I find MANAN, HTN, DM. Follow SCr. Continue anti-hypertensives.  See full note.
See today's note and consult note. I independently performed the key portions of the evaluation and management service provided. I agree with the above history, physical, and plan which I have reviewed and edited where appropriate. I find MANAN, HTN, DM. Follow SCr. Continue anti-hypertensives. Stop metformin.  See full note.
See today's note and consult note. I independently performed the key portions of the evaluation and management service provided. I agree with the above history, physical, and plan which I have reviewed and edited where appropriate. I find MANAN, HTN, DM. Follow SCr. Continue anti-hypertensives. Stop metformin.  See full note.
Patient seen and examined at bedside. Agree with exam, a/p as above with following addendum/edits:    No complaints today, still feels "lousy." Febrile overnight to 101, still within 24-48 hour window of initiating antibiotics. Restarted on lasix yesterday given pulm edema on exam and imaging. On exam today, laying flat, saturating well on RA, MMM, obese, lungs w/ bibasilar crackles, abd soft NTND, no CVA tenderness, +condom cath, LE WWP, no edema.     78 year old M w/ PMH of IDDM, HTN, CAD p/w urinary frequency and urgency, found to be septic 2/2 GNR bacteremia.     78 year old M w/     1. Sepsis 2/2 GNR bacteremia: w/ tachycardia and fever, c/w ceftriaxone, f/u urine culture, likely urine source, f/u urine culture f/u renal US, given persistent fever will check CT non-con to r/o any stones although no pain,   2. MANAN: Cr initial downtrend but now stabilized, baseline Cr 1, intrinsic renal disease, most likely ATN 2/2 pre-renal MANAN, f/u renal US, trend Cr  3. Transaminitis: trend LFTs, likely 2/2 sepsis  4. Heart failure w/ reduced EF: no history of HF, echo w/ EF of 35% here, given global hypokinesis likely septic cardiomyopathy, no signs decompensation, restart lasix 40 mg IV, can give additional prn if worsening SOB, strict I's and O's, f/u cardiology recs, f/u repeat x-ray  5. Elevated troponins: likely demand ischemia in setting of sepsis and tachycardia  6. Tachycardia: appears to be a flutter w/ block, c/w lopressor, f/u cardiology recs, will need A/C  7. Depression  8. IDDM  9. Hypoalbuminemia: nutrition consult  10. Dispo: for ALEYDA, will need negative blood cultures and speciation prior to d/c as well as control of HR
Please see housestaff note for full details.  I have reviewed the case, examined the patient and agree with plan.  78 M HTN DM admitted with sepsis. Found to have AFlutter and acute systolic HF.  Patient is s/p Aflutter ablation.  Continue current  medications.  Continue AC.     I have personally provided  45 minutes of critical care time excluding time spent on separate procedures.
Patient seen and examined at bedside. Agree with exam, a/p as above with following addendum/edits:    78 year old M w/ PMH of IDDM, HTN, CAD p/w urinary frequency and urgency, found to be septic 2/2 GNR bacteremia.     78 year old M w/     1. Sepsis 2/2 GNR bacteremia: w/ tachycardia and fever, c/w ceftriaxone, f/u urine culture, likely urine source, check for prostatitis, f/u renal US,   2. MANAN: unclear Cr but will obtain collateral, intrinsic renal disease, most likely ATN 2/2 pre-renal MANAN, f/u renal US, trend Cr  3. Transaminitis: trend LFTs, likely 2/2 sepsis  4. Heart failure w/ reduced EF: no history of HF, echo w/ EF of 35% here, given global hypokinesis likely septic cardiomyopathy, no signs decompensation, will give lasix if SOB develops  5. Elevated troponins: likely demand ischemia in setting of sepsis and tachycardia  6. Depression  7. IDDM  8. Hypoalbuminemia: nutrition consult  9. Dispo: pending PT, will need negative blood cultures and speciation

## 2019-06-11 DIAGNOSIS — Z79.4 LONG TERM (CURRENT) USE OF INSULIN: ICD-10-CM

## 2019-06-11 DIAGNOSIS — A41.51 SEPSIS DUE TO ESCHERICHIA COLI [E. COLI]: ICD-10-CM

## 2019-06-11 DIAGNOSIS — I13.0 HYPERTENSIVE HEART AND CHRONIC KIDNEY DISEASE WITH HEART FAILURE AND STAGE 1 THROUGH STAGE 4 CHRONIC KIDNEY DISEASE, OR UNSPECIFIED CHRONIC KIDNEY DISEASE: ICD-10-CM

## 2019-06-11 DIAGNOSIS — E11.21 TYPE 2 DIABETES MELLITUS WITH DIABETIC NEPHROPATHY: ICD-10-CM

## 2019-06-11 DIAGNOSIS — I42.8 OTHER CARDIOMYOPATHIES: ICD-10-CM

## 2019-06-11 DIAGNOSIS — N32.81 OVERACTIVE BLADDER: ICD-10-CM

## 2019-06-11 DIAGNOSIS — I50.21 ACUTE SYSTOLIC (CONGESTIVE) HEART FAILURE: ICD-10-CM

## 2019-06-11 DIAGNOSIS — E11.42 TYPE 2 DIABETES MELLITUS WITH DIABETIC POLYNEUROPATHY: ICD-10-CM

## 2019-06-11 DIAGNOSIS — J98.09 OTHER DISEASES OF BRONCHUS, NOT ELSEWHERE CLASSIFIED: ICD-10-CM

## 2019-06-11 DIAGNOSIS — I34.0 NONRHEUMATIC MITRAL (VALVE) INSUFFICIENCY: ICD-10-CM

## 2019-06-11 DIAGNOSIS — F32.9 MAJOR DEPRESSIVE DISORDER, SINGLE EPISODE, UNSPECIFIED: ICD-10-CM

## 2019-06-11 DIAGNOSIS — D63.1 ANEMIA IN CHRONIC KIDNEY DISEASE: ICD-10-CM

## 2019-06-11 DIAGNOSIS — M17.0 BILATERAL PRIMARY OSTEOARTHRITIS OF KNEE: ICD-10-CM
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
## 2019-06-11 DIAGNOSIS — I48.3 TYPICAL ATRIAL FLUTTER: ICD-10-CM

## 2019-06-11 DIAGNOSIS — N18.9 CHRONIC KIDNEY DISEASE, UNSPECIFIED: ICD-10-CM

## 2019-06-11 DIAGNOSIS — J95.88 OTHER INTRAOPERATIVE COMPLICATIONS OF RESPIRATORY SYSTEM, NOT ELSEWHERE CLASSIFIED: ICD-10-CM

## 2019-06-11 DIAGNOSIS — E87.2 ACIDOSIS: ICD-10-CM
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
## 2019-06-11 DIAGNOSIS — Z72.0 TOBACCO USE: ICD-10-CM

## 2019-06-11 DIAGNOSIS — N17.0 ACUTE KIDNEY FAILURE WITH TUBULAR NECROSIS: ICD-10-CM

## 2019-06-11 DIAGNOSIS — E11.22 TYPE 2 DIABETES MELLITUS WITH DIABETIC CHRONIC KIDNEY DISEASE: ICD-10-CM

## 2019-06-11 DIAGNOSIS — I25.10 ATHEROSCLEROTIC HEART DISEASE OF NATIVE CORONARY ARTERY WITHOUT ANGINA PECTORIS: ICD-10-CM

## 2019-06-11 DIAGNOSIS — I11.0 HYPERTENSIVE HEART DISEASE WITH HEART FAILURE: ICD-10-CM

## 2019-06-11 DIAGNOSIS — I49.1 ATRIAL PREMATURE DEPOLARIZATION: ICD-10-CM

## 2019-06-11 DIAGNOSIS — E88.09 OTHER DISORDERS OF PLASMA-PROTEIN METABOLISM, NOT ELSEWHERE CLASSIFIED: ICD-10-CM

## 2019-06-13 PROCEDURE — 85025 COMPLETE CBC W/AUTO DIFF WBC: CPT

## 2019-06-13 PROCEDURE — 97116 GAIT TRAINING THERAPY: CPT

## 2019-06-13 PROCEDURE — 96361 HYDRATE IV INFUSION ADD-ON: CPT

## 2019-06-13 PROCEDURE — 97161 PT EVAL LOW COMPLEX 20 MIN: CPT

## 2019-06-13 PROCEDURE — 76770 US EXAM ABDO BACK WALL COMP: CPT

## 2019-06-13 PROCEDURE — 87040 BLOOD CULTURE FOR BACTERIA: CPT

## 2019-06-13 PROCEDURE — 87486 CHLMYD PNEUM DNA AMP PROBE: CPT

## 2019-06-13 PROCEDURE — 84156 ASSAY OF PROTEIN URINE: CPT

## 2019-06-13 PROCEDURE — 84100 ASSAY OF PHOSPHORUS: CPT

## 2019-06-13 PROCEDURE — 93970 EXTREMITY STUDY: CPT

## 2019-06-13 PROCEDURE — 93312 ECHO TRANSESOPHAGEAL: CPT

## 2019-06-13 PROCEDURE — 82553 CREATINE MB FRACTION: CPT

## 2019-06-13 PROCEDURE — 82728 ASSAY OF FERRITIN: CPT

## 2019-06-13 PROCEDURE — 84443 ASSAY THYROID STIM HORMONE: CPT

## 2019-06-13 PROCEDURE — 83550 IRON BINDING TEST: CPT

## 2019-06-13 PROCEDURE — 81001 URINALYSIS AUTO W/SCOPE: CPT

## 2019-06-13 PROCEDURE — 82330 ASSAY OF CALCIUM: CPT

## 2019-06-13 PROCEDURE — 82550 ASSAY OF CK (CPK): CPT

## 2019-06-13 PROCEDURE — 83540 ASSAY OF IRON: CPT

## 2019-06-13 PROCEDURE — 84132 ASSAY OF SERUM POTASSIUM: CPT

## 2019-06-13 PROCEDURE — 96375 TX/PRO/DX INJ NEW DRUG ADDON: CPT

## 2019-06-13 PROCEDURE — 85610 PROTHROMBIN TIME: CPT

## 2019-06-13 PROCEDURE — 80048 BASIC METABOLIC PNL TOTAL CA: CPT

## 2019-06-13 PROCEDURE — 83036 HEMOGLOBIN GLYCOSYLATED A1C: CPT

## 2019-06-13 PROCEDURE — C1731: CPT

## 2019-06-13 PROCEDURE — 87186 SC STD MICRODIL/AGAR DIL: CPT

## 2019-06-13 PROCEDURE — 83605 ASSAY OF LACTIC ACID: CPT

## 2019-06-13 PROCEDURE — 74176 CT ABD & PELVIS W/O CONTRAST: CPT

## 2019-06-13 PROCEDURE — 93005 ELECTROCARDIOGRAM TRACING: CPT

## 2019-06-13 PROCEDURE — 83735 ASSAY OF MAGNESIUM: CPT

## 2019-06-13 PROCEDURE — 80053 COMPREHEN METABOLIC PANEL: CPT

## 2019-06-13 PROCEDURE — 84484 ASSAY OF TROPONIN QUANT: CPT

## 2019-06-13 PROCEDURE — 87086 URINE CULTURE/COLONY COUNT: CPT

## 2019-06-13 PROCEDURE — 87633 RESP VIRUS 12-25 TARGETS: CPT

## 2019-06-13 PROCEDURE — 96374 THER/PROPH/DIAG INJ IV PUSH: CPT

## 2019-06-13 PROCEDURE — 85027 COMPLETE CBC AUTOMATED: CPT

## 2019-06-13 PROCEDURE — 99285 EMERGENCY DEPT VISIT HI MDM: CPT | Mod: 25

## 2019-06-13 PROCEDURE — 87150 DNA/RNA AMPLIFIED PROBE: CPT

## 2019-06-13 PROCEDURE — C1733: CPT

## 2019-06-13 PROCEDURE — 85045 AUTOMATED RETICULOCYTE COUNT: CPT

## 2019-06-13 PROCEDURE — 82803 BLOOD GASES ANY COMBINATION: CPT

## 2019-06-13 PROCEDURE — 36415 COLL VENOUS BLD VENIPUNCTURE: CPT

## 2019-06-13 PROCEDURE — 97530 THERAPEUTIC ACTIVITIES: CPT

## 2019-06-13 PROCEDURE — C1893: CPT

## 2019-06-13 PROCEDURE — 87798 DETECT AGENT NOS DNA AMP: CPT

## 2019-06-13 PROCEDURE — 84300 ASSAY OF URINE SODIUM: CPT

## 2019-06-13 PROCEDURE — 84295 ASSAY OF SERUM SODIUM: CPT

## 2019-06-13 PROCEDURE — 93306 TTE W/DOPPLER COMPLETE: CPT

## 2019-06-13 PROCEDURE — 82962 GLUCOSE BLOOD TEST: CPT

## 2019-06-13 PROCEDURE — 83880 ASSAY OF NATRIURETIC PEPTIDE: CPT

## 2019-06-13 PROCEDURE — 84540 ASSAY OF URINE/UREA-N: CPT

## 2019-06-13 PROCEDURE — 84466 ASSAY OF TRANSFERRIN: CPT

## 2019-06-13 PROCEDURE — 87581 M.PNEUMON DNA AMP PROBE: CPT

## 2019-06-13 PROCEDURE — C1894: CPT

## 2019-06-13 PROCEDURE — 82570 ASSAY OF URINE CREATININE: CPT

## 2019-06-13 PROCEDURE — 97110 THERAPEUTIC EXERCISES: CPT

## 2019-06-13 PROCEDURE — 85730 THROMBOPLASTIN TIME PARTIAL: CPT

## 2019-06-13 PROCEDURE — 71045 X-RAY EXAM CHEST 1 VIEW: CPT

## 2019-06-24 ENCOUNTER — APPOINTMENT (OUTPATIENT)
Dept: HEART AND VASCULAR | Facility: CLINIC | Age: 79
End: 2019-06-24

## 2021-03-24 NOTE — PATIENT PROFILE ADULT - NSPROGENOTHERPROVIDER_GEN_A_NUR
#2 message left to call clinic regarding below    Message sent through [address] Portal to contact clinic and advise none

## 2022-03-08 NOTE — PROGRESS NOTE ADULT - PROBLEM SELECTOR PLAN 8
F- no IVF, s/p 2L NS  E-replete as needed  N- DASH/TLC, carb consistent   C- FULL CODE  DVt ppx: HSQ Has chronic OA of bilateral knees, uses cane for ambulation. States his ambulation has been deteriorating with worsening pain. Takes tylenol/motrin daily for pain control .Xrays inpatient c/w OA.   - PT eval    # Generalized weakness- likely multifactorial with current infection with bacteremia, vs. deconditioning with underlying arthritis, has chronic back pain. muscle strength 5/5 in upper extremities but 4/5 in lower extremities.   - PT eval no Has chronic OA of bilateral knees, uses cane for ambulation. States his ambulation has been deteriorating with worsening pain. Takes tylenol/motrin daily for pain control .Xrays inpatient c/w OA.   - PT eval    # Generalized weakness- likely multifactorial with current infection with bacteremia, vs. deconditioning with underlying arthritis, has chronic back pain. muscle strength 5/5 in upper extremities but 4/5 in lower extremities.   - PT eval    #Depression  - continue Sertraline 100mg qd     #Overactive bladder   - continue home oxybutynin 5mg qd

## 2022-11-01 NOTE — ED PROVIDER NOTE - PRO INTERPRETER NEED 2
Information: Selecting Yes will display possible errors in your note based on the variables you have selected. This validation is only offered as a suggestion for you. PLEASE NOTE THAT THE VALIDATION TEXT WILL BE REMOVED WHEN YOU FINALIZE YOUR NOTE. IF YOU WANT TO FAX A PRELIMINARY NOTE YOU WILL NEED TO TOGGLE THIS TO 'NO' IF YOU DO NOT WANT IT IN YOUR FAXED NOTE. English

## 2024-02-12 NOTE — PROGRESS NOTE ADULT - PROBLEM SELECTOR PLAN 4
87 Pt denies hx of HF but on Furosemide 80mg QD at home. On admission BNP 3296 (received fluid while @ [facility]).   - remains euvolemic on exam. denies SOB, no edema.   - CXR on admission: Mild pulmonary venous congestion  - Echo: moderate global hypokinesis, concentric LVH, EF 35%  -JAROD with mild LVH, global LV hypokinesis, dilated LA, mild-mod MR, moderate plaque in aorta  - s/p 40 Lasix on 5/29 in setting of volume overload; currently euvolemic and CXR showing improvement of effusions  - Holding Lasix and lisinopril for now given MANAN.

## 2024-08-06 NOTE — DIETITIAN INITIAL EVALUATION ADULT. - NS AS NUTRI INTERV MEALS SNACK
Medications to Hold   Medications and Supplements:  - Morning of surgery hold any Vitamins AND for 2 weeks prior hold any Vitamin E or Herbals       LING Risk (Diuretics, ACE-I/ARB, NSAIDs):   - 24 hours before surgery hold (losartan-hydrochlorothiazide (HYZAAR) 100-25 MG per tablet) and if taking potassium      Continue all other medications unless an alternative plan was advised with the surgeon and/or specialist.   Mineral - modified diet/Carbohydrate - modified diet